# Patient Record
Sex: MALE | Race: WHITE | NOT HISPANIC OR LATINO | Employment: OTHER | URBAN - METROPOLITAN AREA
[De-identification: names, ages, dates, MRNs, and addresses within clinical notes are randomized per-mention and may not be internally consistent; named-entity substitution may affect disease eponyms.]

---

## 2018-01-10 ENCOUNTER — TRANSCRIBE ORDERS (OUTPATIENT)
Dept: LAB | Facility: CLINIC | Age: 68
End: 2018-01-10

## 2018-01-10 DIAGNOSIS — E78.2 MIXED HYPERLIPIDEMIA: Primary | ICD-10-CM

## 2021-05-18 ENCOUNTER — TRANSCRIBE ORDERS (OUTPATIENT)
Dept: LAB | Facility: CLINIC | Age: 71
End: 2021-05-18

## 2021-05-18 ENCOUNTER — APPOINTMENT (OUTPATIENT)
Dept: LAB | Facility: CLINIC | Age: 71
End: 2021-05-18
Payer: COMMERCIAL

## 2021-05-18 DIAGNOSIS — N40.0 BENIGN PROSTATIC HYPERPLASIA, UNSPECIFIED WHETHER LOWER URINARY TRACT SYMPTOMS PRESENT: ICD-10-CM

## 2021-05-18 DIAGNOSIS — E66.9 LIFELONG OBESITY: ICD-10-CM

## 2021-05-18 DIAGNOSIS — R97.20 ELEVATED PROSTATE SPECIFIC ANTIGEN (PSA): ICD-10-CM

## 2021-05-18 DIAGNOSIS — I10 ESSENTIAL HYPERTENSION, BENIGN: ICD-10-CM

## 2021-05-18 DIAGNOSIS — R53.83 TIREDNESS: ICD-10-CM

## 2021-05-18 DIAGNOSIS — E55.9 VITAMIN D DEFICIENCY DISEASE: ICD-10-CM

## 2021-05-18 DIAGNOSIS — E78.00 PURE HYPERCHOLESTEROLEMIA: ICD-10-CM

## 2021-05-18 DIAGNOSIS — E29.1 MALE HYPOGONADISM: ICD-10-CM

## 2021-05-18 DIAGNOSIS — I10 ESSENTIAL HYPERTENSION, BENIGN: Primary | ICD-10-CM

## 2021-05-18 LAB
25(OH)D3 SERPL-MCNC: 21.4 NG/ML (ref 30–100)
ALBUMIN SERPL BCP-MCNC: 3.8 G/DL (ref 3.5–5)
ALP SERPL-CCNC: 85 U/L (ref 46–116)
ALT SERPL W P-5'-P-CCNC: 32 U/L (ref 12–78)
ANION GAP SERPL CALCULATED.3IONS-SCNC: 4 MMOL/L (ref 4–13)
AST SERPL W P-5'-P-CCNC: 21 U/L (ref 5–45)
BASOPHILS # BLD AUTO: 0.03 THOUSANDS/ΜL (ref 0–0.1)
BASOPHILS NFR BLD AUTO: 0 % (ref 0–1)
BILIRUB SERPL-MCNC: 0.82 MG/DL (ref 0.2–1)
BILIRUB UR QL STRIP: NEGATIVE
BUN SERPL-MCNC: 16 MG/DL (ref 5–25)
CALCIUM SERPL-MCNC: 9.1 MG/DL (ref 8.3–10.1)
CHLORIDE SERPL-SCNC: 107 MMOL/L (ref 100–108)
CHOLEST SERPL-MCNC: 166 MG/DL (ref 50–200)
CLARITY UR: CLEAR
CO2 SERPL-SCNC: 30 MMOL/L (ref 21–32)
COLOR UR: YELLOW
CREAT SERPL-MCNC: 0.89 MG/DL (ref 0.6–1.3)
EOSINOPHIL # BLD AUTO: 0.4 THOUSAND/ΜL (ref 0–0.61)
EOSINOPHIL NFR BLD AUTO: 6 % (ref 0–6)
ERYTHROCYTE [DISTWIDTH] IN BLOOD BY AUTOMATED COUNT: 13.8 % (ref 11.6–15.1)
GFR SERPL CREATININE-BSD FRML MDRD: 85 ML/MIN/1.73SQ M
GLUCOSE P FAST SERPL-MCNC: 93 MG/DL (ref 65–99)
GLUCOSE UR STRIP-MCNC: NEGATIVE MG/DL
HCT VFR BLD AUTO: 50.7 % (ref 36.5–49.3)
HDLC SERPL-MCNC: 51 MG/DL
HGB BLD-MCNC: 16.5 G/DL (ref 12–17)
HGB UR QL STRIP.AUTO: NEGATIVE
IMM GRANULOCYTES # BLD AUTO: 0.01 THOUSAND/UL (ref 0–0.2)
IMM GRANULOCYTES NFR BLD AUTO: 0 % (ref 0–2)
KETONES UR STRIP-MCNC: NEGATIVE MG/DL
LDLC SERPL CALC-MCNC: 95 MG/DL (ref 0–100)
LEUKOCYTE ESTERASE UR QL STRIP: NEGATIVE
LYMPHOCYTES # BLD AUTO: 2.2 THOUSANDS/ΜL (ref 0.6–4.47)
LYMPHOCYTES NFR BLD AUTO: 33 % (ref 14–44)
MCH RBC QN AUTO: 29 PG (ref 26.8–34.3)
MCHC RBC AUTO-ENTMCNC: 32.5 G/DL (ref 31.4–37.4)
MCV RBC AUTO: 89 FL (ref 82–98)
MONOCYTES # BLD AUTO: 0.46 THOUSAND/ΜL (ref 0.17–1.22)
MONOCYTES NFR BLD AUTO: 7 % (ref 4–12)
NEUTROPHILS # BLD AUTO: 3.57 THOUSANDS/ΜL (ref 1.85–7.62)
NEUTS SEG NFR BLD AUTO: 54 % (ref 43–75)
NITRITE UR QL STRIP: NEGATIVE
NONHDLC SERPL-MCNC: 115 MG/DL
NRBC BLD AUTO-RTO: 0 /100 WBCS
PH UR STRIP.AUTO: 7 [PH]
PLATELET # BLD AUTO: 163 THOUSANDS/UL (ref 149–390)
PMV BLD AUTO: 11 FL (ref 8.9–12.7)
POTASSIUM SERPL-SCNC: 3.8 MMOL/L (ref 3.5–5.3)
PROT SERPL-MCNC: 7.7 G/DL (ref 6.4–8.2)
PROT UR STRIP-MCNC: NEGATIVE MG/DL
PSA SERPL-MCNC: 0.4 NG/ML (ref 0–4)
RBC # BLD AUTO: 5.69 MILLION/UL (ref 3.88–5.62)
SODIUM SERPL-SCNC: 141 MMOL/L (ref 136–145)
SP GR UR STRIP.AUTO: 1.01 (ref 1–1.03)
TESTOST SERPL-MCNC: 257 NG/DL (ref 95–948)
TRIGL SERPL-MCNC: 101 MG/DL
UROBILINOGEN UR QL STRIP.AUTO: 0.2 E.U./DL
VIT B12 SERPL-MCNC: 658 PG/ML (ref 100–900)
WBC # BLD AUTO: 6.67 THOUSAND/UL (ref 4.31–10.16)

## 2021-05-18 PROCEDURE — 80053 COMPREHEN METABOLIC PANEL: CPT

## 2021-05-18 PROCEDURE — 80061 LIPID PANEL: CPT

## 2021-05-18 PROCEDURE — 84403 ASSAY OF TOTAL TESTOSTERONE: CPT

## 2021-05-18 PROCEDURE — 82607 VITAMIN B-12: CPT

## 2021-05-18 PROCEDURE — 82306 VITAMIN D 25 HYDROXY: CPT

## 2021-05-18 PROCEDURE — 85025 COMPLETE CBC W/AUTO DIFF WBC: CPT

## 2021-05-18 PROCEDURE — 36415 COLL VENOUS BLD VENIPUNCTURE: CPT

## 2021-05-18 PROCEDURE — 81003 URINALYSIS AUTO W/O SCOPE: CPT

## 2021-05-18 PROCEDURE — G0103 PSA SCREENING: HCPCS

## 2021-11-03 ENCOUNTER — APPOINTMENT (OUTPATIENT)
Dept: LAB | Facility: CLINIC | Age: 71
End: 2021-11-03
Payer: COMMERCIAL

## 2021-11-03 DIAGNOSIS — I10 ESSENTIAL HYPERTENSION, MALIGNANT: ICD-10-CM

## 2021-11-03 DIAGNOSIS — E78.00 PURE HYPERCHOLESTEROLEMIA: ICD-10-CM

## 2021-11-03 LAB
ALBUMIN SERPL BCP-MCNC: 3.7 G/DL (ref 3.5–5)
ALP SERPL-CCNC: 86 U/L (ref 46–116)
ALT SERPL W P-5'-P-CCNC: 28 U/L (ref 12–78)
ANION GAP SERPL CALCULATED.3IONS-SCNC: 3 MMOL/L (ref 4–13)
AST SERPL W P-5'-P-CCNC: 17 U/L (ref 5–45)
BILIRUB SERPL-MCNC: 0.76 MG/DL (ref 0.2–1)
BUN SERPL-MCNC: 12 MG/DL (ref 5–25)
CALCIUM SERPL-MCNC: 9.3 MG/DL (ref 8.3–10.1)
CHLORIDE SERPL-SCNC: 106 MMOL/L (ref 100–108)
CHOLEST SERPL-MCNC: 152 MG/DL (ref 50–200)
CO2 SERPL-SCNC: 32 MMOL/L (ref 21–32)
CREAT SERPL-MCNC: 0.98 MG/DL (ref 0.6–1.3)
GFR SERPL CREATININE-BSD FRML MDRD: 76 ML/MIN/1.73SQ M
GLUCOSE P FAST SERPL-MCNC: 103 MG/DL (ref 65–99)
HDLC SERPL-MCNC: 50 MG/DL
LDLC SERPL CALC-MCNC: 82 MG/DL (ref 0–100)
NONHDLC SERPL-MCNC: 102 MG/DL
POTASSIUM SERPL-SCNC: 3.7 MMOL/L (ref 3.5–5.3)
PROT SERPL-MCNC: 8 G/DL (ref 6.4–8.2)
SODIUM SERPL-SCNC: 141 MMOL/L (ref 136–145)
TRIGL SERPL-MCNC: 100 MG/DL

## 2021-11-03 PROCEDURE — 80053 COMPREHEN METABOLIC PANEL: CPT

## 2021-11-03 PROCEDURE — 36415 COLL VENOUS BLD VENIPUNCTURE: CPT

## 2021-11-03 PROCEDURE — 80061 LIPID PANEL: CPT

## 2022-02-15 ENCOUNTER — TELEPHONE (OUTPATIENT)
Dept: FAMILY MEDICINE CLINIC | Facility: CLINIC | Age: 72
End: 2022-02-15

## 2022-02-15 NOTE — TELEPHONE ENCOUNTER
Per pt chart pt is being followed by Dr Shoenberger in [facility]    Please remove Dr Horton as pt PCP

## 2022-05-10 ENCOUNTER — APPOINTMENT (OUTPATIENT)
Dept: LAB | Facility: CLINIC | Age: 72
End: 2022-05-10
Payer: COMMERCIAL

## 2022-05-10 DIAGNOSIS — E55.9 AVITAMINOSIS D: ICD-10-CM

## 2022-05-10 DIAGNOSIS — E78.01 FAMILIAL HYPERCHOLESTEROLEMIA: ICD-10-CM

## 2022-05-10 DIAGNOSIS — I51.9 MYXEDEMA HEART DISEASE: ICD-10-CM

## 2022-05-10 DIAGNOSIS — R97.20 ELEVATED PROSTATE SPECIFIC ANTIGEN (PSA): ICD-10-CM

## 2022-05-10 DIAGNOSIS — E03.9 MYXEDEMA HEART DISEASE: ICD-10-CM

## 2022-05-10 DIAGNOSIS — E53.8 BIOTIN-(PROPIONYL-COA-CARBOXYLASE) LIGASE DEFICIENCY: ICD-10-CM

## 2022-05-10 DIAGNOSIS — I10 ESSENTIAL HYPERTENSION, MALIGNANT: ICD-10-CM

## 2022-05-10 DIAGNOSIS — N40.0 ENLARGED PROSTATE: ICD-10-CM

## 2022-05-10 LAB
25(OH)D3 SERPL-MCNC: 74.3 NG/ML (ref 30–100)
ALBUMIN SERPL BCP-MCNC: 3.7 G/DL (ref 3.5–5)
ALP SERPL-CCNC: 80 U/L (ref 46–116)
ALT SERPL W P-5'-P-CCNC: 35 U/L (ref 12–78)
ANION GAP SERPL CALCULATED.3IONS-SCNC: 3 MMOL/L (ref 4–13)
AST SERPL W P-5'-P-CCNC: 23 U/L (ref 5–45)
BASOPHILS # BLD AUTO: 0.02 THOUSANDS/ΜL (ref 0–0.1)
BASOPHILS NFR BLD AUTO: 0 % (ref 0–1)
BILIRUB SERPL-MCNC: 0.86 MG/DL (ref 0.2–1)
BUN SERPL-MCNC: 20 MG/DL (ref 5–25)
CALCIUM SERPL-MCNC: 9.2 MG/DL (ref 8.3–10.1)
CHLORIDE SERPL-SCNC: 111 MMOL/L (ref 100–108)
CHOLEST SERPL-MCNC: 160 MG/DL
CO2 SERPL-SCNC: 30 MMOL/L (ref 21–32)
CREAT SERPL-MCNC: 1.08 MG/DL (ref 0.6–1.3)
EOSINOPHIL # BLD AUTO: 0.36 THOUSAND/ΜL (ref 0–0.61)
EOSINOPHIL NFR BLD AUTO: 4 % (ref 0–6)
ERYTHROCYTE [DISTWIDTH] IN BLOOD BY AUTOMATED COUNT: 13.8 % (ref 11.6–15.1)
GFR SERPL CREATININE-BSD FRML MDRD: 67 ML/MIN/1.73SQ M
GLUCOSE P FAST SERPL-MCNC: 107 MG/DL (ref 65–99)
HCT VFR BLD AUTO: 49.5 % (ref 36.5–49.3)
HDLC SERPL-MCNC: 50 MG/DL
HGB BLD-MCNC: 16 G/DL (ref 12–17)
IMM GRANULOCYTES # BLD AUTO: 0.02 THOUSAND/UL (ref 0–0.2)
IMM GRANULOCYTES NFR BLD AUTO: 0 % (ref 0–2)
LDLC SERPL CALC-MCNC: 88 MG/DL (ref 0–100)
LYMPHOCYTES # BLD AUTO: 1.35 THOUSANDS/ΜL (ref 0.6–4.47)
LYMPHOCYTES NFR BLD AUTO: 17 % (ref 14–44)
MCH RBC QN AUTO: 28.6 PG (ref 26.8–34.3)
MCHC RBC AUTO-ENTMCNC: 32.3 G/DL (ref 31.4–37.4)
MCV RBC AUTO: 89 FL (ref 82–98)
MONOCYTES # BLD AUTO: 0.59 THOUSAND/ΜL (ref 0.17–1.22)
MONOCYTES NFR BLD AUTO: 7 % (ref 4–12)
NEUTROPHILS # BLD AUTO: 5.78 THOUSANDS/ΜL (ref 1.85–7.62)
NEUTS SEG NFR BLD AUTO: 72 % (ref 43–75)
NONHDLC SERPL-MCNC: 110 MG/DL
NRBC BLD AUTO-RTO: 0 /100 WBCS
PLATELET # BLD AUTO: 163 THOUSANDS/UL (ref 149–390)
PMV BLD AUTO: 11.1 FL (ref 8.9–12.7)
POTASSIUM SERPL-SCNC: 4 MMOL/L (ref 3.5–5.3)
PROT SERPL-MCNC: 7.8 G/DL (ref 6.4–8.2)
PSA SERPL-MCNC: 0.8 NG/ML (ref 0–4)
RBC # BLD AUTO: 5.59 MILLION/UL (ref 3.88–5.62)
SODIUM SERPL-SCNC: 144 MMOL/L (ref 136–145)
TRIGL SERPL-MCNC: 112 MG/DL
TSH SERPL DL<=0.05 MIU/L-ACNC: 3.49 UIU/ML (ref 0.45–4.5)
VIT B12 SERPL-MCNC: 673 PG/ML (ref 100–900)
WBC # BLD AUTO: 8.12 THOUSAND/UL (ref 4.31–10.16)

## 2022-05-10 PROCEDURE — 36415 COLL VENOUS BLD VENIPUNCTURE: CPT

## 2022-05-10 PROCEDURE — 80053 COMPREHEN METABOLIC PANEL: CPT

## 2022-05-10 PROCEDURE — 85025 COMPLETE CBC W/AUTO DIFF WBC: CPT

## 2022-05-10 PROCEDURE — 82306 VITAMIN D 25 HYDROXY: CPT

## 2022-05-10 PROCEDURE — 84443 ASSAY THYROID STIM HORMONE: CPT

## 2022-05-10 PROCEDURE — G0103 PSA SCREENING: HCPCS

## 2022-05-10 PROCEDURE — 82607 VITAMIN B-12: CPT

## 2022-05-10 PROCEDURE — 80061 LIPID PANEL: CPT

## 2022-05-31 NOTE — TELEPHONE ENCOUNTER
05/31/22 3:48 PM     Thank you for your request  Your request has been received, reviewed, and the patient chart updated  The PCP has successfully been removed with a patient attribution note  Please remind staff to not remove PCP at office level for this scenario; [facility] Department will remove  This message will now be completed      Thank you  Marlo Freeman

## 2022-12-14 ENCOUNTER — APPOINTMENT (OUTPATIENT)
Dept: LAB | Facility: CLINIC | Age: 72
End: 2022-12-14

## 2022-12-14 DIAGNOSIS — E55.9 AVITAMINOSIS D: ICD-10-CM

## 2022-12-14 DIAGNOSIS — N40.0 BENIGN PROSTATIC HYPERPLASIA, UNSPECIFIED WHETHER LOWER URINARY TRACT SYMPTOMS PRESENT: ICD-10-CM

## 2022-12-14 DIAGNOSIS — I51.9 MYXEDEMA HEART DISEASE: ICD-10-CM

## 2022-12-14 DIAGNOSIS — I10 ESSENTIAL HYPERTENSION, MALIGNANT: ICD-10-CM

## 2022-12-14 DIAGNOSIS — E53.8 BIOTIN-(PROPIONYL-COA-CARBOXYLASE) LIGASE DEFICIENCY: ICD-10-CM

## 2022-12-14 DIAGNOSIS — E03.9 MYXEDEMA HEART DISEASE: ICD-10-CM

## 2022-12-14 DIAGNOSIS — E78.49 OTHER HYPERLIPIDEMIA: ICD-10-CM

## 2022-12-14 LAB
25(OH)D3 SERPL-MCNC: 69.9 NG/ML (ref 30–100)
ALBUMIN SERPL BCP-MCNC: 3.8 G/DL (ref 3.5–5)
ALP SERPL-CCNC: 69 U/L (ref 46–116)
ALT SERPL W P-5'-P-CCNC: 31 U/L (ref 12–78)
ANION GAP SERPL CALCULATED.3IONS-SCNC: 5 MMOL/L (ref 4–13)
AST SERPL W P-5'-P-CCNC: 21 U/L (ref 5–45)
BASOPHILS # BLD AUTO: 0.02 THOUSANDS/ÂΜL (ref 0–0.1)
BASOPHILS NFR BLD AUTO: 0 % (ref 0–1)
BILIRUB SERPL-MCNC: 0.69 MG/DL (ref 0.2–1)
BUN SERPL-MCNC: 18 MG/DL (ref 5–25)
CALCIUM SERPL-MCNC: 9.2 MG/DL (ref 8.3–10.1)
CHLORIDE SERPL-SCNC: 108 MMOL/L (ref 96–108)
CHOLEST SERPL-MCNC: 162 MG/DL
CO2 SERPL-SCNC: 30 MMOL/L (ref 21–32)
CREAT SERPL-MCNC: 0.97 MG/DL (ref 0.6–1.3)
EOSINOPHIL # BLD AUTO: 0.43 THOUSAND/ÂΜL (ref 0–0.61)
EOSINOPHIL NFR BLD AUTO: 6 % (ref 0–6)
ERYTHROCYTE [DISTWIDTH] IN BLOOD BY AUTOMATED COUNT: 13.9 % (ref 11.6–15.1)
GFR SERPL CREATININE-BSD FRML MDRD: 76 ML/MIN/1.73SQ M
GLUCOSE P FAST SERPL-MCNC: 106 MG/DL (ref 65–99)
HCT VFR BLD AUTO: 50.9 % (ref 36.5–49.3)
HDLC SERPL-MCNC: 55 MG/DL
HGB BLD-MCNC: 16.1 G/DL (ref 12–17)
IMM GRANULOCYTES # BLD AUTO: 0.02 THOUSAND/UL (ref 0–0.2)
IMM GRANULOCYTES NFR BLD AUTO: 0 % (ref 0–2)
LDLC SERPL CALC-MCNC: 90 MG/DL (ref 0–100)
LYMPHOCYTES # BLD AUTO: 2.3 THOUSANDS/ÂΜL (ref 0.6–4.47)
LYMPHOCYTES NFR BLD AUTO: 31 % (ref 14–44)
MCH RBC QN AUTO: 28.9 PG (ref 26.8–34.3)
MCHC RBC AUTO-ENTMCNC: 31.6 G/DL (ref 31.4–37.4)
MCV RBC AUTO: 91 FL (ref 82–98)
MONOCYTES # BLD AUTO: 0.6 THOUSAND/ÂΜL (ref 0.17–1.22)
MONOCYTES NFR BLD AUTO: 8 % (ref 4–12)
NEUTROPHILS # BLD AUTO: 4.18 THOUSANDS/ÂΜL (ref 1.85–7.62)
NEUTS SEG NFR BLD AUTO: 55 % (ref 43–75)
NONHDLC SERPL-MCNC: 107 MG/DL
NRBC BLD AUTO-RTO: 0 /100 WBCS
PLATELET # BLD AUTO: 209 THOUSANDS/UL (ref 149–390)
PMV BLD AUTO: 11.1 FL (ref 8.9–12.7)
POTASSIUM SERPL-SCNC: 4 MMOL/L (ref 3.5–5.3)
PROT SERPL-MCNC: 7.3 G/DL (ref 6.4–8.4)
PSA SERPL-MCNC: 0.5 NG/ML (ref 0–4)
RBC # BLD AUTO: 5.58 MILLION/UL (ref 3.88–5.62)
SODIUM SERPL-SCNC: 143 MMOL/L (ref 135–147)
T4 FREE SERPL-MCNC: 1 NG/DL (ref 0.76–1.46)
TESTOST SERPL-MCNC: 206 NG/DL (ref 95–948)
TRIGL SERPL-MCNC: 86 MG/DL
TSH SERPL DL<=0.05 MIU/L-ACNC: 6.79 UIU/ML (ref 0.45–4.5)
VIT B12 SERPL-MCNC: 741 PG/ML (ref 100–900)
WBC # BLD AUTO: 7.55 THOUSAND/UL (ref 4.31–10.16)

## 2022-12-21 ENCOUNTER — HOSPITAL ENCOUNTER (OUTPATIENT)
Facility: MEDICAL CENTER | Age: 72
Discharge: HOME | End: 2022-12-21

## 2022-12-21 DIAGNOSIS — R22.1 NECK MASS: ICD-10-CM

## 2023-04-07 ENCOUNTER — APPOINTMENT (OUTPATIENT)
Dept: LAB | Facility: CLINIC | Age: 73
End: 2023-04-07

## 2023-04-07 ENCOUNTER — TRANSCRIBE ORDERS (OUTPATIENT)
Dept: LAB | Facility: CLINIC | Age: 73
End: 2023-04-07

## 2023-04-07 DIAGNOSIS — E03.9 HYPOTHYROIDISM (ACQUIRED): Primary | ICD-10-CM

## 2023-04-07 DIAGNOSIS — E03.9 HYPOTHYROIDISM (ACQUIRED): ICD-10-CM

## 2023-04-07 LAB
T4 FREE SERPL-MCNC: 0.98 NG/DL (ref 0.76–1.46)
TSH SERPL DL<=0.05 MIU/L-ACNC: 3.43 UIU/ML (ref 0.45–4.5)

## 2023-05-31 ENCOUNTER — APPOINTMENT (OUTPATIENT)
Dept: LAB | Facility: CLINIC | Age: 73
End: 2023-05-31
Payer: COMMERCIAL

## 2023-05-31 DIAGNOSIS — Z01.818 PRE-OPERATIVE EXAMINATION: ICD-10-CM

## 2023-05-31 DIAGNOSIS — K11.5 SIALOLITHIASIS: ICD-10-CM

## 2023-05-31 LAB
ANION GAP SERPL CALCULATED.3IONS-SCNC: 1 MMOL/L (ref 4–13)
BASOPHILS # BLD AUTO: 0.04 THOUSANDS/ÂΜL (ref 0–0.1)
BASOPHILS NFR BLD AUTO: 1 % (ref 0–1)
BUN SERPL-MCNC: 14 MG/DL (ref 5–25)
CALCIUM SERPL-MCNC: 9.4 MG/DL (ref 8.3–10.1)
CHLORIDE SERPL-SCNC: 109 MMOL/L (ref 96–108)
CO2 SERPL-SCNC: 31 MMOL/L (ref 21–32)
CREAT SERPL-MCNC: 1.08 MG/DL (ref 0.6–1.3)
EOSINOPHIL # BLD AUTO: 0.58 THOUSAND/ÂΜL (ref 0–0.61)
EOSINOPHIL NFR BLD AUTO: 8 % (ref 0–6)
ERYTHROCYTE [DISTWIDTH] IN BLOOD BY AUTOMATED COUNT: 13.6 % (ref 11.6–15.1)
GFR SERPL CREATININE-BSD FRML MDRD: 66 ML/MIN/1.73SQ M
GLUCOSE P FAST SERPL-MCNC: 104 MG/DL (ref 65–99)
HCT VFR BLD AUTO: 49.2 % (ref 36.5–49.3)
HGB BLD-MCNC: 15.9 G/DL (ref 12–17)
IMM GRANULOCYTES # BLD AUTO: 0.01 THOUSAND/UL (ref 0–0.2)
IMM GRANULOCYTES NFR BLD AUTO: 0 % (ref 0–2)
LYMPHOCYTES # BLD AUTO: 2.11 THOUSANDS/ÂΜL (ref 0.6–4.47)
LYMPHOCYTES NFR BLD AUTO: 27 % (ref 14–44)
MCH RBC QN AUTO: 28.9 PG (ref 26.8–34.3)
MCHC RBC AUTO-ENTMCNC: 32.3 G/DL (ref 31.4–37.4)
MCV RBC AUTO: 90 FL (ref 82–98)
MONOCYTES # BLD AUTO: 0.48 THOUSAND/ÂΜL (ref 0.17–1.22)
MONOCYTES NFR BLD AUTO: 6 % (ref 4–12)
NEUTROPHILS # BLD AUTO: 4.55 THOUSANDS/ÂΜL (ref 1.85–7.62)
NEUTS SEG NFR BLD AUTO: 58 % (ref 43–75)
NRBC BLD AUTO-RTO: 0 /100 WBCS
PLATELET # BLD AUTO: 232 THOUSANDS/UL (ref 149–390)
PMV BLD AUTO: 10.8 FL (ref 8.9–12.7)
POTASSIUM SERPL-SCNC: 3.7 MMOL/L (ref 3.5–5.3)
RBC # BLD AUTO: 5.5 MILLION/UL (ref 3.88–5.62)
SODIUM SERPL-SCNC: 141 MMOL/L (ref 135–147)
WBC # BLD AUTO: 7.77 THOUSAND/UL (ref 4.31–10.16)

## 2023-05-31 PROCEDURE — 80048 BASIC METABOLIC PNL TOTAL CA: CPT

## 2023-05-31 PROCEDURE — 85025 COMPLETE CBC W/AUTO DIFF WBC: CPT

## 2023-05-31 PROCEDURE — 36415 COLL VENOUS BLD VENIPUNCTURE: CPT

## 2023-06-08 RX ORDER — IBUPROFEN 400 MG/1
TABLET ORAL EVERY 6 HOURS PRN
COMMUNITY

## 2023-06-08 RX ORDER — ACETAMINOPHEN 325 MG/1
650 TABLET ORAL EVERY 6 HOURS PRN
COMMUNITY

## 2023-06-08 RX ORDER — MULTIVIT-MIN/IRON FUM/FOLIC AC 7.5 MG-4
1 TABLET ORAL DAILY
COMMUNITY

## 2023-06-08 NOTE — PRE-PROCEDURE INSTRUCTIONS
Pre-Surgery Instructions:   Medication Instructions   • acetaminophen (TYLENOL) 325 mg tablet Uses PRN- OK to take day of surgery   • aspirin (ECOTRIN LOW STRENGTH) 81 mg EC tablet Stop taking 7 days prior to surgery  • atorvastatin (LIPITOR) 20 mg tablet Take night before surgery   • CALCIUM-VITAMIN D PO Hold day of surgery  • candesartan (ATACAND) 32 MG tablet Take night before surgery   • ibuprofen (MOTRIN) 400 mg tablet Stop taking 7 days prior to surgery  • indomethacin (INDOCIN) 50 mg capsule Stop taking 7 days prior to surgery  • loratadine (CLARITIN) 10 mg tablet Take night before surgery   • Multiple Vitamins-Minerals (multivitamin with minerals) tablet Stop taking 7 days prior to surgery  • VITAMIN D PO Hold day of surgery  See above      Medication instructions for day surgery reviewed  Please use only a sip of water to take your instructed medications  Avoid all over the counter vitamins, supplements and NSAIDS for one week prior to surgery per anesthesia guidelines  Tylenol is ok to take as needed  You will receive a call one business day prior to surgery with an arrival time and hospital directions  If your surgery is scheduled on a Monday, the hospital will be calling you on the Friday prior to your surgery  If you have not heard from anyone by 8pm, please call the hospital supervisor through the hospital  at [phone]  Edwina Bansal [phone])  Do not eat or drink anything after midnight the night before your surgery, including candy, mints, lifesavers, or chewing gum  Do not drink alcohol 24hrs before your surgery  Try not to smoke at least 24hrs before your surgery  Follow the pre surgery showering instructions as listed in the [facility] Surgical Experience Booklet” or otherwise provided by your surgeon's office  Do not shave the surgical area 24 hours before surgery   Do not apply any lotions, creams, including makeup, cologne, deodorant, or perfumes after showering on the day of your surgery  No contact lenses, eye make-up, or artificial eyelashes  Remove nail polish, including gel polish, and any artificial, gel, or acrylic nails if possible  Remove all jewelry including rings and body piercing jewelry  Wear causal clothing that is easy to take on and off  Consider your type of surgery  Keep any valuables, jewelry, piercings at home  Please bring any specially ordered equipment (sling, braces) if indicated  Arrange for a responsible person to drive you to and from the hospital on the day of your surgery  Visitor Guidelines discussed  Call the surgeon's office with any new illnesses, exposures, or additional questions prior to surgery  Please reference your [facility] Surgical Experience Booklet” for additional information to prepare for your upcoming surgery

## 2023-06-13 ENCOUNTER — ANESTHESIA EVENT (OUTPATIENT)
Dept: PERIOP | Facility: HOSPITAL | Age: 73
End: 2023-06-13
Payer: COMMERCIAL

## 2023-06-14 ENCOUNTER — ANESTHESIA (OUTPATIENT)
Dept: PERIOP | Facility: HOSPITAL | Age: 73
End: 2023-06-14
Payer: COMMERCIAL

## 2023-06-14 ENCOUNTER — HOSPITAL ENCOUNTER (OUTPATIENT)
Facility: HOSPITAL | Age: 73
Setting detail: OUTPATIENT SURGERY
Discharge: HOME | End: 2023-06-14
Attending: OTOLARYNGOLOGY | Admitting: OTOLARYNGOLOGY
Payer: COMMERCIAL

## 2023-06-14 VITALS
TEMPERATURE: 97 F | WEIGHT: 235 LBS | HEART RATE: 84 BPM | DIASTOLIC BLOOD PRESSURE: 64 MMHG | RESPIRATION RATE: 16 BRPM | SYSTOLIC BLOOD PRESSURE: 132 MMHG | HEIGHT: 74 IN | OXYGEN SATURATION: 94 % | BODY MASS INDEX: 30.16 KG/M2

## 2023-06-14 DIAGNOSIS — K11.5 SIALOLITH: ICD-10-CM

## 2023-06-14 DIAGNOSIS — K11.23 CHRONIC SIALOADENITIS: Primary | ICD-10-CM

## 2023-06-14 DIAGNOSIS — K11.23 CHRONIC SIALOADENITIS: ICD-10-CM

## 2023-06-14 PROCEDURE — 88365 INSITU HYBRIDIZATION (FISH): CPT | Performed by: STUDENT IN AN ORGANIZED HEALTH CARE EDUCATION/TRAINING PROGRAM

## 2023-06-14 PROCEDURE — 88364 INSITU HYBRIDIZATION (FISH): CPT | Performed by: STUDENT IN AN ORGANIZED HEALTH CARE EDUCATION/TRAINING PROGRAM

## 2023-06-14 PROCEDURE — 88341 IMHCHEM/IMCYTCHM EA ADD ANTB: CPT | Performed by: STUDENT IN AN ORGANIZED HEALTH CARE EDUCATION/TRAINING PROGRAM

## 2023-06-14 PROCEDURE — 88342 IMHCHEM/IMCYTCHM 1ST ANTB: CPT | Performed by: STUDENT IN AN ORGANIZED HEALTH CARE EDUCATION/TRAINING PROGRAM

## 2023-06-14 PROCEDURE — 88307 TISSUE EXAM BY PATHOLOGIST: CPT | Performed by: STUDENT IN AN ORGANIZED HEALTH CARE EDUCATION/TRAINING PROGRAM

## 2023-06-14 PROCEDURE — 42440 EXCISE SUBMAXILLARY GLAND: CPT | Performed by: OTOLARYNGOLOGY

## 2023-06-14 RX ORDER — CEFAZOLIN SODIUM 2 G/50ML
SOLUTION INTRAVENOUS AS NEEDED
Status: DISCONTINUED | OUTPATIENT
Start: 2023-06-14 | End: 2023-06-14

## 2023-06-14 RX ORDER — ONDANSETRON 2 MG/ML
INJECTION INTRAMUSCULAR; INTRAVENOUS AS NEEDED
Status: DISCONTINUED | OUTPATIENT
Start: 2023-06-14 | End: 2023-06-14

## 2023-06-14 RX ORDER — LIDOCAINE HYDROCHLORIDE AND EPINEPHRINE 10; 10 MG/ML; UG/ML
INJECTION, SOLUTION INFILTRATION; PERINEURAL AS NEEDED
Status: DISCONTINUED | OUTPATIENT
Start: 2023-06-14 | End: 2023-06-14 | Stop reason: HOSPADM

## 2023-06-14 RX ORDER — SODIUM CHLORIDE 9 MG/ML
125 INJECTION, SOLUTION INTRAVENOUS CONTINUOUS
Status: DISCONTINUED | OUTPATIENT
Start: 2023-06-14 | End: 2023-06-14 | Stop reason: HOSPADM

## 2023-06-14 RX ORDER — SUCCINYLCHOLINE/SOD CL,ISO/PF 100 MG/5ML
SYRINGE (ML) INTRAVENOUS AS NEEDED
Status: DISCONTINUED | OUTPATIENT
Start: 2023-06-14 | End: 2023-06-14

## 2023-06-14 RX ORDER — HYDROMORPHONE HCL/PF 1 MG/ML
0.5 SYRINGE (ML) INJECTION
Status: CANCELLED | OUTPATIENT
Start: 2023-06-14

## 2023-06-14 RX ORDER — HYDROMORPHONE HCL/PF 1 MG/ML
SYRINGE (ML) INJECTION AS NEEDED
Status: DISCONTINUED | OUTPATIENT
Start: 2023-06-14 | End: 2023-06-14

## 2023-06-14 RX ORDER — MAGNESIUM HYDROXIDE 1200 MG/15ML
LIQUID ORAL AS NEEDED
Status: DISCONTINUED | OUTPATIENT
Start: 2023-06-14 | End: 2023-06-14 | Stop reason: HOSPADM

## 2023-06-14 RX ORDER — ACETAMINOPHEN 325 MG/1
325 TABLET ORAL EVERY 6 HOURS PRN
Status: DISCONTINUED | OUTPATIENT
Start: 2023-06-14 | End: 2023-06-14 | Stop reason: HOSPADM

## 2023-06-14 RX ORDER — PROPOFOL 10 MG/ML
INJECTION, EMULSION INTRAVENOUS CONTINUOUS PRN
Status: DISCONTINUED | OUTPATIENT
Start: 2023-06-14 | End: 2023-06-14

## 2023-06-14 RX ORDER — EPHEDRINE SULFATE 50 MG/ML
INJECTION INTRAVENOUS AS NEEDED
Status: DISCONTINUED | OUTPATIENT
Start: 2023-06-14 | End: 2023-06-14

## 2023-06-14 RX ORDER — ONDANSETRON 2 MG/ML
4 INJECTION INTRAMUSCULAR; INTRAVENOUS ONCE AS NEEDED
Status: DISCONTINUED | OUTPATIENT
Start: 2023-06-14 | End: 2023-06-14 | Stop reason: HOSPADM

## 2023-06-14 RX ORDER — MIDAZOLAM HYDROCHLORIDE 2 MG/2ML
INJECTION, SOLUTION INTRAMUSCULAR; INTRAVENOUS AS NEEDED
Status: DISCONTINUED | OUTPATIENT
Start: 2023-06-14 | End: 2023-06-14

## 2023-06-14 RX ORDER — DEXAMETHASONE SODIUM PHOSPHATE 10 MG/ML
INJECTION, SOLUTION INTRAMUSCULAR; INTRAVENOUS AS NEEDED
Status: DISCONTINUED | OUTPATIENT
Start: 2023-06-14 | End: 2023-06-14

## 2023-06-14 RX ORDER — FENTANYL CITRATE 50 UG/ML
INJECTION, SOLUTION INTRAMUSCULAR; INTRAVENOUS AS NEEDED
Status: DISCONTINUED | OUTPATIENT
Start: 2023-06-14 | End: 2023-06-14

## 2023-06-14 RX ORDER — HYDROCODONE BITARTRATE AND ACETAMINOPHEN 5; 325 MG/1; MG/1
1 TABLET ORAL EVERY 6 HOURS PRN
Qty: 15 TABLET | Refills: 0 | Status: SHIPPED | OUTPATIENT
Start: 2023-06-14

## 2023-06-14 RX ORDER — FENTANYL CITRATE/PF 50 MCG/ML
50 SYRINGE (ML) INJECTION
Status: DISCONTINUED | OUTPATIENT
Start: 2023-06-14 | End: 2023-06-14 | Stop reason: HOSPADM

## 2023-06-14 RX ORDER — OXYCODONE HYDROCHLORIDE 5 MG/1
5 TABLET ORAL EVERY 4 HOURS PRN
Status: DISCONTINUED | OUTPATIENT
Start: 2023-06-14 | End: 2023-06-14 | Stop reason: HOSPADM

## 2023-06-14 RX ORDER — PROPOFOL 10 MG/ML
INJECTION, EMULSION INTRAVENOUS AS NEEDED
Status: DISCONTINUED | OUTPATIENT
Start: 2023-06-14 | End: 2023-06-14

## 2023-06-14 RX ADMIN — EPHEDRINE SULFATE 5 MG: 50 INJECTION, SOLUTION INTRAVENOUS at 14:04

## 2023-06-14 RX ADMIN — HYDROMORPHONE HYDROCHLORIDE 0.25 MG: 1 INJECTION, SOLUTION INTRAMUSCULAR; INTRAVENOUS; SUBCUTANEOUS at 13:34

## 2023-06-14 RX ADMIN — SODIUM CHLORIDE 125 ML/HR: 0.9 INJECTION, SOLUTION INTRAVENOUS at 08:59

## 2023-06-14 RX ADMIN — OXYCODONE HYDROCHLORIDE 5 MG: 5 TABLET ORAL at 16:16

## 2023-06-14 RX ADMIN — SODIUM CHLORIDE 0.2 MCG/KG/MIN: 9 INJECTION, SOLUTION INTRAVENOUS at 12:42

## 2023-06-14 RX ADMIN — EPHEDRINE SULFATE 5 MG: 50 INJECTION, SOLUTION INTRAVENOUS at 13:04

## 2023-06-14 RX ADMIN — PHENYLEPHRINE HYDROCHLORIDE 40 MCG/MIN: 10 INJECTION INTRAVENOUS at 12:58

## 2023-06-14 RX ADMIN — EPHEDRINE SULFATE 10 MG: 50 INJECTION, SOLUTION INTRAVENOUS at 12:55

## 2023-06-14 RX ADMIN — EPHEDRINE SULFATE 5 MG: 50 INJECTION, SOLUTION INTRAVENOUS at 13:13

## 2023-06-14 RX ADMIN — DEXAMETHASONE SODIUM PHOSPHATE 10 MG: 10 INJECTION INTRAMUSCULAR; INTRAVENOUS at 12:43

## 2023-06-14 RX ADMIN — ONDANSETRON 4 MG: 2 INJECTION INTRAMUSCULAR; INTRAVENOUS at 12:43

## 2023-06-14 RX ADMIN — MIDAZOLAM 2 MG: 1 INJECTION INTRAMUSCULAR; INTRAVENOUS at 12:28

## 2023-06-14 RX ADMIN — HYDROMORPHONE HYDROCHLORIDE 0.25 MG: 1 INJECTION, SOLUTION INTRAMUSCULAR; INTRAVENOUS; SUBCUTANEOUS at 13:44

## 2023-06-14 RX ADMIN — FENTANYL CITRATE 50 MCG: 50 INJECTION, SOLUTION INTRAMUSCULAR; INTRAVENOUS at 14:55

## 2023-06-14 RX ADMIN — PROPOFOL 200 MG: 10 INJECTION, EMULSION INTRAVENOUS at 12:38

## 2023-06-14 RX ADMIN — PROPOFOL 130 MCG/KG/MIN: 10 INJECTION, EMULSION INTRAVENOUS at 12:42

## 2023-06-14 RX ADMIN — EPHEDRINE SULFATE 5 MG: 50 INJECTION, SOLUTION INTRAVENOUS at 13:55

## 2023-06-14 RX ADMIN — Medication 100 MG: at 12:38

## 2023-06-14 RX ADMIN — SODIUM CHLORIDE 125 ML/HR: 0.9 INJECTION, SOLUTION INTRAVENOUS at 15:00

## 2023-06-14 RX ADMIN — SODIUM CHLORIDE: 0.9 INJECTION, SOLUTION INTRAVENOUS at 13:18

## 2023-06-14 RX ADMIN — CEFAZOLIN SODIUM 2000 MG: 2 SOLUTION INTRAVENOUS at 12:50

## 2023-06-14 RX ADMIN — FENTANYL CITRATE 100 MCG: 50 INJECTION INTRAMUSCULAR; INTRAVENOUS at 12:38

## 2023-06-14 NOTE — PROGRESS NOTES
Dr Cesia Sharif here to talk to pt and wife earlier   Dr Gregoria Caruso text concerning pain medication for home, Motrin and Tylenol no narcotic or prescription on discharge instructions

## 2023-06-14 NOTE — ANESTHESIA PREPROCEDURE EVALUATION
Procedure:  SUBMANDIBULAR GLAND EXICISON (Left: Neck)    Relevant Problems   CARDIO   (+) Hyperlipidemia   (+) Hypertension        Physical Exam    Airway    Mallampati score: I  TM Distance: >3 FB  Neck ROM: full     Dental       Cardiovascular  Rhythm: regular, Rate: normal, Cardiovascular exam normal    Pulmonary  Pulmonary exam normal     Other Findings        Anesthesia Plan  ASA Score- 2     Anesthesia Type- general with ASA Monitors  Additional Monitors:   Airway Plan: ETT  Plan Factors-Exercise tolerance (METS): >4 METS  Chart reviewed  Existing labs reviewed  Patient summary reviewed  Patient is not a current smoker  Obstructive sleep apnea risk education given perioperatively  Induction- intravenous  Postoperative Plan-     Informed Consent- Anesthetic plan and risks discussed with patient

## 2023-06-14 NOTE — OP NOTE
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]    Procedure(s):  Left - SUBMANDIBULAR GLAND EXICISON    Specimen(s):  ID Type Source Tests Collected by Time Destination   1 : Left Submandibular gland Tissue Submandibular gland TISSUE EXAM Neo Dotson MD 2023  1:52 PM        Estimated Blood Loss:   5 mL    Drains:  * No LDAs found *    Anesthesia Type:   General    Operative Indications:  Sialolith [K11 5]  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]    Operative Findings:  Large 5cm massive sialolith  Preservation of marginal, lingual and hypoglossal  Facial vessels preserved     Complications:   None    Procedure and Technique:  Patient taken to the operating room and placed on the operating table in the supine position  Timeout performed  Patient placed under general endotracheal anesthesia  The planned incision was marked in a RSTL approximately 2 fingerbreadths below and parallel to the inferior border of the mandible on the left side  Lidocaine with epinephrine was infiltrated along the planned incision  The patient was prepped and draped in usual sterile fashion  15-blade was used to incise the skin  Electrocautery was used to continue dissection through subcutaneous tissue and platysma until the capsule of the submandibular gland was reached  Blunt dissection continued along the capsule  The facial vein/artery was identified, and preserved  The gland was reflected superiorly and dissection continued around it  The lingual nerve was identified and perserved, while a small branch to the gland was ligated    The facial artery was again ligated superiorly, and Whartons duct was identified, clamped, divided, and controlled with a suture ligature  Dissection continued until the specimen was freed circumferentially from surrounding tissue  The specimen was passed off the field  The wound was irrigated, and hemostasis was obtained with electrocautery, with care to avoid iatrogenic injury to the hypoglossal, facial, and lingual nerves  The deep tissue was approximated with 3-0 vicryl in interrupted fashion  The skin was closed with 4-0 monocryl in running subcuticular fashion  Steri-strips were applied to the incision  Patient was taken out of general anesthesia and transferred to the PACU in stable condition  I was present for the entire procedure , A qualified resident physician was not available  and A co-surgeon was required because of skills and techniques relevant to speciality      Patient Disposition:  PACU         SIGNATURE: Pawel Bill MD  DATE: June 14, 2023  TIME: 2:24 PM

## 2023-06-14 NOTE — DISCHARGE INSTR - AVS FIRST PAGE
SON Arango  Post-Operative Instructions  Office ([phone]       - Keep wound clean and dry  May apply dressing as needed but generally leave open to air  - Okay to shower but do not submerge in water for 2 weeks, pat incision dry (don't rub)  - Avoid lifting anything greater than 10 lbs (a gallon of milk) for 3 weeks  - Activity and diet as tolerated  - May use tylenol or Advil for pain  - Follow up in 1 weeks  - Watch for signs of fever, chills, warmth, redness, or drainage  A slight amount is normal for a day or two following surgery  Seek medical attention for: fever >101 5 F, increasing warmth, reddness, swelling, bleeding, or anything that may concern you  How to contact us:    Phone: If you have questions or concerns, please call us at [phone] during business hours (8 am to 5 pm)  On nights and weekends, you may page the ENT surgeon on call  at [facility]   In case of emergency, please call [phone]

## 2023-06-14 NOTE — ANESTHESIA POSTPROCEDURE EVALUATION
"Post-Op Assessment Note    CV Status:  Stable    Pain management: adequate     Mental Status:  Alert and awake   Hydration Status:  Euvolemic   PONV Controlled:  Controlled   Airway Patency:  Patent      Post Op Vitals Reviewed: Yes      Staff: Anesthesiologist         No notable events documented      BP      Temp      Pulse     Resp      SpO2      /72   Pulse 86   Temp 97 5 °F (36 4 °C)   Resp 16   Ht 6' 2\" (1 88 m)   Wt 107 kg (235 lb)   SpO2 95%   BMI 30 17 kg/m²     "

## 2023-06-14 NOTE — H&P
Surgery Pre-op note/Updated History and Physical    Date of service: 6/14/202311:58 AM      No changes from most recent clinic H&P note  Patient to OR for left SMG  Pmh: Reviewed  Pshx: Reviewed  Social history: Reviewed  Medications: Reviewed  ROS: as above      Vitals:    06/14/23 0857   BP: 138/88   Pulse: 84   Resp: 16   Temp: 98 °F (36 7 °C)   SpO2: 94%       ENT: Large left SMG  Chest/Heart/Lungs: Breathing, perfused, unremarkable  Abd: Unremarkable  Ext: Unremarkable        The procedure was discussed with the patient, including risks, benefits, and alternatives and all questions were answered  Consent signed and in the chart      James Brewster MD MPH  Otolaryngology--Head and Neck Surgery  [employer]  6/14/2023 11:58 AM\

## 2023-06-19 PROCEDURE — 88307 TISSUE EXAM BY PATHOLOGIST: CPT | Performed by: STUDENT IN AN ORGANIZED HEALTH CARE EDUCATION/TRAINING PROGRAM

## 2023-06-19 PROCEDURE — 88342 IMHCHEM/IMCYTCHM 1ST ANTB: CPT | Performed by: STUDENT IN AN ORGANIZED HEALTH CARE EDUCATION/TRAINING PROGRAM

## 2023-06-19 PROCEDURE — 88364 INSITU HYBRIDIZATION (FISH): CPT | Performed by: STUDENT IN AN ORGANIZED HEALTH CARE EDUCATION/TRAINING PROGRAM

## 2023-06-19 PROCEDURE — 88341 IMHCHEM/IMCYTCHM EA ADD ANTB: CPT | Performed by: STUDENT IN AN ORGANIZED HEALTH CARE EDUCATION/TRAINING PROGRAM

## 2023-06-19 PROCEDURE — 88365 INSITU HYBRIDIZATION (FISH): CPT | Performed by: STUDENT IN AN ORGANIZED HEALTH CARE EDUCATION/TRAINING PROGRAM

## 2023-11-27 ENCOUNTER — APPOINTMENT (OUTPATIENT)
Dept: LAB | Facility: CLINIC | Age: 73
End: 2023-11-27
Payer: COMMERCIAL

## 2023-11-27 DIAGNOSIS — I10 ESSENTIAL HYPERTENSION, MALIGNANT: ICD-10-CM

## 2023-11-27 DIAGNOSIS — E78.00 PURE HYPERCHOLESTEROLEMIA: ICD-10-CM

## 2023-11-27 LAB
ALBUMIN SERPL BCP-MCNC: 4.3 G/DL (ref 3.5–5)
ALP SERPL-CCNC: 67 U/L (ref 34–104)
ALT SERPL W P-5'-P-CCNC: 20 U/L (ref 7–52)
ANION GAP SERPL CALCULATED.3IONS-SCNC: 6 MMOL/L
AST SERPL W P-5'-P-CCNC: 27 U/L (ref 13–39)
BILIRUB SERPL-MCNC: 0.69 MG/DL (ref 0.2–1)
BUN SERPL-MCNC: 20 MG/DL (ref 5–25)
CALCIUM SERPL-MCNC: 9.9 MG/DL (ref 8.4–10.2)
CHLORIDE SERPL-SCNC: 103 MMOL/L (ref 96–108)
CHOLEST SERPL-MCNC: 197 MG/DL
CO2 SERPL-SCNC: 33 MMOL/L (ref 21–32)
CREAT SERPL-MCNC: 0.91 MG/DL (ref 0.6–1.3)
GFR SERPL CREATININE-BSD FRML MDRD: 82 ML/MIN/1.73SQ M
GLUCOSE P FAST SERPL-MCNC: 87 MG/DL (ref 65–99)
HDLC SERPL-MCNC: 56 MG/DL
LDLC SERPL CALC-MCNC: 118 MG/DL (ref 0–100)
NONHDLC SERPL-MCNC: 141 MG/DL
POTASSIUM SERPL-SCNC: 6.2 MMOL/L (ref 3.5–5.3)
PROT SERPL-MCNC: 7.2 G/DL (ref 6.4–8.4)
SODIUM SERPL-SCNC: 142 MMOL/L (ref 135–147)
TRIGL SERPL-MCNC: 114 MG/DL

## 2023-11-27 PROCEDURE — 80053 COMPREHEN METABOLIC PANEL: CPT

## 2023-11-27 PROCEDURE — 36415 COLL VENOUS BLD VENIPUNCTURE: CPT

## 2023-11-27 PROCEDURE — 80061 LIPID PANEL: CPT

## 2024-06-03 ENCOUNTER — APPOINTMENT (OUTPATIENT)
Dept: LAB | Facility: CLINIC | Age: 74
End: 2024-06-03
Payer: COMMERCIAL

## 2024-06-03 DIAGNOSIS — E78.00 PURE HYPERCHOLESTEROLEMIA: ICD-10-CM

## 2024-06-03 DIAGNOSIS — R89.1 ABNORMAL LEVEL OF HORMONES IN SPECIMENS FROM OTH ORG/TISS: ICD-10-CM

## 2024-06-03 DIAGNOSIS — E08.00 DIABETES MELLITUS DUE TO UNDERLYING CONDITION WITH HYPEROSMOLARITY WITHOUT COMA, WITHOUT LONG-TERM CURRENT USE OF INSULIN (HCC): ICD-10-CM

## 2024-06-03 DIAGNOSIS — E55.9 AVITAMINOSIS D: ICD-10-CM

## 2024-06-03 DIAGNOSIS — I10 ESSENTIAL HYPERTENSION, MALIGNANT: ICD-10-CM

## 2024-06-03 DIAGNOSIS — E53.8 BIOTIN-(PROPIONYL-COA-CARBOXYLASE) LIGASE DEFICIENCY: ICD-10-CM

## 2024-06-03 LAB
25(OH)D3 SERPL-MCNC: 79.4 NG/ML (ref 30–100)
ALBUMIN SERPL BCP-MCNC: 4.2 G/DL (ref 3.5–5)
ALP SERPL-CCNC: 64 U/L (ref 34–104)
ALT SERPL W P-5'-P-CCNC: 12 U/L (ref 7–52)
ANION GAP SERPL CALCULATED.3IONS-SCNC: 12 MMOL/L (ref 4–13)
AST SERPL W P-5'-P-CCNC: 19 U/L (ref 13–39)
BACTERIA UR QL AUTO: ABNORMAL /HPF
BASOPHILS # BLD AUTO: 0.02 THOUSANDS/ÂΜL (ref 0–0.1)
BASOPHILS NFR BLD AUTO: 0 % (ref 0–1)
BILIRUB SERPL-MCNC: 0.62 MG/DL (ref 0.2–1)
BILIRUB UR QL STRIP: NEGATIVE
BUDDING YEAST: PRESENT
BUN SERPL-MCNC: 19 MG/DL (ref 5–25)
CALCIUM SERPL-MCNC: 9.4 MG/DL (ref 8.4–10.2)
CAOX CRY URNS QL MICRO: ABNORMAL /HPF
CHLORIDE SERPL-SCNC: 106 MMOL/L (ref 96–108)
CHOLEST SERPL-MCNC: 153 MG/DL
CLARITY UR: CLEAR
CO2 SERPL-SCNC: 28 MMOL/L (ref 21–32)
COLOR UR: YELLOW
CREAT SERPL-MCNC: 0.95 MG/DL (ref 0.6–1.3)
CREAT UR-MCNC: 196.8 MG/DL
EOSINOPHIL # BLD AUTO: 0.44 THOUSAND/ÂΜL (ref 0–0.61)
EOSINOPHIL NFR BLD AUTO: 7 % (ref 0–6)
ERYTHROCYTE [DISTWIDTH] IN BLOOD BY AUTOMATED COUNT: 13.9 % (ref 11.6–15.1)
GFR SERPL CREATININE-BSD FRML MDRD: 77 ML/MIN/1.73SQ M
GLUCOSE P FAST SERPL-MCNC: 106 MG/DL (ref 65–99)
GLUCOSE UR STRIP-MCNC: NEGATIVE MG/DL
HCT VFR BLD AUTO: 50 % (ref 36.5–49.3)
HDLC SERPL-MCNC: 51 MG/DL
HGB BLD-MCNC: 16.2 G/DL (ref 12–17)
HGB UR QL STRIP.AUTO: NEGATIVE
HYALINE CASTS #/AREA URNS LPF: ABNORMAL /LPF
IMM GRANULOCYTES # BLD AUTO: 0.02 THOUSAND/UL (ref 0–0.2)
IMM GRANULOCYTES NFR BLD AUTO: 0 % (ref 0–2)
KETONES UR STRIP-MCNC: NEGATIVE MG/DL
LDLC SERPL CALC-MCNC: 88 MG/DL (ref 0–100)
LEUKOCYTE ESTERASE UR QL STRIP: NEGATIVE
LYMPHOCYTES # BLD AUTO: 1.87 THOUSANDS/ÂΜL (ref 0.6–4.47)
LYMPHOCYTES NFR BLD AUTO: 29 % (ref 14–44)
MCH RBC QN AUTO: 29.1 PG (ref 26.8–34.3)
MCHC RBC AUTO-ENTMCNC: 32.4 G/DL (ref 31.4–37.4)
MCV RBC AUTO: 90 FL (ref 82–98)
MICROALBUMIN UR-MCNC: 9.6 MG/L
MICROALBUMIN/CREAT 24H UR: 5 MG/G CREATININE (ref 0–30)
MONOCYTES # BLD AUTO: 0.49 THOUSAND/ÂΜL (ref 0.17–1.22)
MONOCYTES NFR BLD AUTO: 8 % (ref 4–12)
MUCOUS THREADS UR QL AUTO: ABNORMAL
NEUTROPHILS # BLD AUTO: 3.67 THOUSANDS/ÂΜL (ref 1.85–7.62)
NEUTS SEG NFR BLD AUTO: 56 % (ref 43–75)
NITRITE UR QL STRIP: NEGATIVE
NON-SQ EPI CELLS URNS QL MICRO: ABNORMAL /HPF
NONHDLC SERPL-MCNC: 102 MG/DL
NRBC BLD AUTO-RTO: 0 /100 WBCS
PH UR STRIP.AUTO: 6.5 [PH]
PLATELET # BLD AUTO: 158 THOUSANDS/UL (ref 149–390)
PMV BLD AUTO: 12.1 FL (ref 8.9–12.7)
POTASSIUM SERPL-SCNC: 3.7 MMOL/L (ref 3.5–5.3)
PROT SERPL-MCNC: 7.1 G/DL (ref 6.4–8.4)
PROT UR STRIP-MCNC: ABNORMAL MG/DL
PSA SERPL-MCNC: 0.45 NG/ML (ref 0–4)
RBC # BLD AUTO: 5.57 MILLION/UL (ref 3.88–5.62)
RBC #/AREA URNS AUTO: ABNORMAL /HPF
SODIUM SERPL-SCNC: 146 MMOL/L (ref 135–147)
SP GR UR STRIP.AUTO: 1.02 (ref 1–1.03)
TESTOST SERPL-MSCNC: 142 NG/DL
TRIGL SERPL-MCNC: 72 MG/DL
TSH SERPL DL<=0.05 MIU/L-ACNC: 4.45 UIU/ML (ref 0.45–4.5)
UROBILINOGEN UR STRIP-ACNC: <2 MG/DL
VIT B12 SERPL-MCNC: 544 PG/ML (ref 180–914)
WBC # BLD AUTO: 6.51 THOUSAND/UL (ref 4.31–10.16)
WBC #/AREA URNS AUTO: ABNORMAL /HPF

## 2024-06-03 PROCEDURE — 80053 COMPREHEN METABOLIC PANEL: CPT

## 2024-06-03 PROCEDURE — 82043 UR ALBUMIN QUANTITATIVE: CPT

## 2024-06-03 PROCEDURE — 82306 VITAMIN D 25 HYDROXY: CPT

## 2024-06-03 PROCEDURE — 82607 VITAMIN B-12: CPT

## 2024-06-03 PROCEDURE — 84443 ASSAY THYROID STIM HORMONE: CPT

## 2024-06-03 PROCEDURE — G0103 PSA SCREENING: HCPCS

## 2024-06-03 PROCEDURE — 80061 LIPID PANEL: CPT

## 2024-06-03 PROCEDURE — 82570 ASSAY OF URINE CREATININE: CPT

## 2024-06-03 PROCEDURE — 81001 URINALYSIS AUTO W/SCOPE: CPT

## 2024-06-03 PROCEDURE — 36415 COLL VENOUS BLD VENIPUNCTURE: CPT

## 2024-06-03 PROCEDURE — 84403 ASSAY OF TOTAL TESTOSTERONE: CPT

## 2024-06-03 PROCEDURE — 85025 COMPLETE CBC W/AUTO DIFF WBC: CPT

## 2024-07-10 ENCOUNTER — OFFICE VISIT (OUTPATIENT)
Dept: CARDIOLOGY CLINIC | Facility: CLINIC | Age: 74
End: 2024-07-10
Payer: COMMERCIAL

## 2024-07-10 VITALS
BODY MASS INDEX: 30.01 KG/M2 | HEART RATE: 77 BPM | WEIGHT: 233.8 LBS | DIASTOLIC BLOOD PRESSURE: 78 MMHG | HEIGHT: 74 IN | SYSTOLIC BLOOD PRESSURE: 152 MMHG

## 2024-07-10 DIAGNOSIS — I10 HYPERTENSION, UNSPECIFIED TYPE: Primary | ICD-10-CM

## 2024-07-10 DIAGNOSIS — I50.20 HFREF (HEART FAILURE WITH REDUCED EJECTION FRACTION) (HCC): ICD-10-CM

## 2024-07-10 PROCEDURE — 93000 ELECTROCARDIOGRAM COMPLETE: CPT | Performed by: INTERNAL MEDICINE

## 2024-07-10 PROCEDURE — 99204 OFFICE O/P NEW MOD 45 MIN: CPT | Performed by: INTERNAL MEDICINE

## 2024-07-10 RX ORDER — TESTOSTERONE 25 MG/2.5G
25 GEL TRANSDERMAL DAILY
COMMUNITY
Start: 2024-06-18

## 2024-07-10 RX ORDER — SACUBITRIL AND VALSARTAN 24; 26 MG/1; MG/1
1 TABLET, FILM COATED ORAL 2 TIMES DAILY
COMMUNITY
Start: 2024-07-09

## 2024-07-10 RX ORDER — METOPROLOL SUCCINATE 25 MG/1
25 TABLET, EXTENDED RELEASE ORAL DAILY
COMMUNITY
Start: 2024-07-08

## 2024-07-10 RX ORDER — APIXABAN 5 MG/1
5 TABLET, FILM COATED ORAL 2 TIMES DAILY
COMMUNITY
Start: 2024-07-08

## 2024-07-10 NOTE — PROGRESS NOTES
Cardiology     Clinic Visit Note  Amol Montes De Oca 74 y.o. male   MRN: [id number]    Assessment and Plan      Problem List Items Addressed This Visit       Hypertension - Primary    Relevant Medications    metoprolol succinate (TOPROL-XL) 25 mg 24 hr tablet    Other Relevant Orders    POCT ECG    AMB extended holter monitor    Echo complete w/ contrast if indicated    HFrEF (heart failure with reduced ejection fraction) (HCC)    Relevant Medications    metoprolol succinate (TOPROL-XL) 25 mg 24 hr tablet    sacubitril-valsartan (Entresto) 24-26 MG TABS    Other Relevant Orders    AMB extended holter monitor    Echo complete w/ contrast if indicated     #1 new onset HFrEF--unknown etiology.  Patient was drink heavily when he was young and currently drinks 1 shots of whiskey a day.  Patient does not smoke.  Patient is euvolemic on examination today.  No signs of acute heart failure.  Patient has been initiated on GDMT today.  Patient need to wait 90 days to see if patient is a candidate for ICD implantation.  Will get echocardiogram.  In 3 months.  Scheduled for cardiac cath to determine if it is ischemic related.  Ambulatory referral for heart failure.    2.  Questionable A-fib--patient was initiated on Eliquis after EKG performed at his PCP office.  ECG at this visit revealed normal sinus rhythm with left bundle.  Patient had Zio patch monitor a week but the result was unavailable at this moment.  Advised patient to continue Eliquis for now and will perform ambulatory Holter monitor with Zio patch for 2 weeks.    Patient was a little anxious and all his question was answered assessed satisfactory.  Advised patient to continue performing regular daily activity.      Health Maintenance:  Schedule cardiac cath  Schedule TTE in 3 months  Referral to heart failure        Chief Complaint: Evaluation for biventricular pacemaker/ICD  Subjective     History of Present Illness:  Patient is a 74-year-old male with a past  medical history of hypertension, hyperlipidemia and HFrEF who presented for consultation for biventricular pacemaker/ICD.  Patient was in his PCP office last week for routine checkup and EKG done at that time revealed arrhythmia possible atrial fibrillation.  Patient was reported to cardiologist Dr. Olivera.  Who performed echocardiogram and revealed low LVEF.  Patient was taking Cardesartan and HCTZ for his blood pressure as these have been switched to Entresto and metoprolol.  Patient feels okay, patient denies shortness of breath, chest pain, palpitation or lightheadedness.  Patient denies leg swelling.  Patient is asked to and able to perform regular daily activities and management of mowing grass.  Patient has not been evaluated for ischemic cardiomyopathy in the past.      Previous Cardiology Workup:  TREADMILL STRESS  No results found for this or any previous visit.     ----------------------------------------------------------------------------------------------  NUCLEAR STRESS TEST: No results found for this or any previous visit.    No results found for this or any previous visit.      --------------------------------------------------------------------------------  CATH:  No results found for this or any previous visit.    --------------------------------------------------------------------------------  ECHO:   No results found for this or any previous visit.    No results found for this or any previous visit.    --------------------------------------------------------------------------------  HOLTER  No results found for this or any previous visit.    --------------------------------------------------------------------------------  CAROTIDS  No results found for this or any previous visit.       ---------------------------------------------------------------------------------  Review of Systems   Constitutional:  Negative for chills and fever.   HENT:  Negative for congestion, rhinorrhea, sneezing and sore  throat.    Eyes:  Negative for pain and discharge.   Respiratory:  Negative for cough, chest tightness, shortness of breath and wheezing.    Cardiovascular:  Negative for chest pain and leg swelling.   Gastrointestinal:  Negative for abdominal pain, nausea and vomiting.   Endocrine: Negative for polydipsia, polyphagia and polyuria.   Genitourinary:  Negative for flank pain, frequency and urgency.   Musculoskeletal:  Negative for arthralgias, back pain and joint swelling.   Skin:  Negative for color change and pallor.   Neurological:  Negative for dizziness, weakness, light-headedness and headaches.   Psychiatric/Behavioral:  Negative for agitation and confusion.          Current Outpatient Medications:     acetaminophen (TYLENOL) 325 mg tablet, Take 650 mg by mouth every 6 (six) hours as needed for mild pain, Disp: , Rfl:     atorvastatin (LIPITOR) 20 mg tablet, Take 20 mg by mouth daily at bedtime, Disp: , Rfl:     CALCIUM-VITAMIN D PO, Take 1 capsule by mouth in the morning, Disp: , Rfl:     Eliquis 5 MG, Take 5 mg by mouth 2 (two) times a day, Disp: , Rfl:     ibuprofen (MOTRIN) 400 mg tablet, Take by mouth every 6 (six) hours as needed for mild pain, Disp: , Rfl:     indomethacin (INDOCIN) 50 mg capsule, Take 50 mg by mouth if needed (takes for gout pain as needed), Disp: , Rfl:     loratadine (CLARITIN) 10 mg tablet, Take 10 mg by mouth daily, Disp: , Rfl:     metoprolol succinate (TOPROL-XL) 25 mg 24 hr tablet, Take 25 mg by mouth daily, Disp: , Rfl:     Multiple Vitamins-Minerals (multivitamin with minerals) tablet, Take 1 tablet by mouth daily, Disp: , Rfl:     sacubitril-valsartan (Entresto) 24-26 MG TABS, Take 1 tablet by mouth 2 (two) times a day, Disp: , Rfl:     testosterone (ANDROGEL) 25 MG/2.5GM (1%) GEL, Place 25 mg on the skin daily, Disp: , Rfl:     VITAMIN D PO, Take 1 capsule by mouth in the morning, Disp: , Rfl:   Past Medical History:   Diagnosis Date    COVID-19     1/2023 recoverd @ home     "GERD (gastroesophageal reflux disease)     Gout     Hyperlipidemia     Hypertension     Serum cholesterol elevated      Past Surgical History:   Procedure Laterality Date    COLONOSCOPY      LIPOMA RESECTION Left     Left lower abdomen/groin area    SUBMANDIBULAR GLAND EXCISION Left 6/14/2023    Procedure: SUBMANDIBULAR GLAND EXICISON;  Surgeon: Andrew Hummel MD;  Location: [facility];  Service: ENT     Social History     Socioeconomic History    Marital status: /Civil Union     Spouse name: Not on file    Number of children: Not on file    Years of education: Not on file    Highest education level: Not on file   Occupational History    Not on file   Tobacco Use    Smoking status: Never    Smokeless tobacco: Never    Tobacco comments:     No smoking in the home   Vaping Use    Vaping status: Never Used   Substance and Sexual Activity    Alcohol use: Yes     Comment: 1 drink a day. varies beer wine or liqour 6/12    Drug use: Never    Sexual activity: Not on file   Other Topics Concern    Not on file   Social History Narrative    Not on file     Social Determinants of Health     Financial Resource Strain: Not on file   Food Insecurity: Not on file   Transportation Needs: Not on file   Physical Activity: Not on file   Stress: Not on file   Social Connections: Not on file   Intimate Partner Violence: Not on file   Housing Stability: Not on file     History reviewed. No pertinent family history.  No Known Allergies    Objective     Vitals:    07/10/24 1536   BP: 152/78   BP Location: Left arm   Patient Position: Sitting   Cuff Size: Standard   Pulse: 77   Weight: 106 kg (233 lb 12.8 oz)   Height: 6' 2\" (1.88 m)       Physical exam:     Physical Exam  HENT:      Head: Normocephalic.   Eyes:      Pupils: Pupils are equal, round, and reactive to light.   Cardiovascular:      Rate and Rhythm: Normal rate and regular rhythm.      Heart sounds: No murmur heard.  Pulmonary:      Effort: Pulmonary effort is " normal. No respiratory distress.      Breath sounds: No wheezing or rales.   Abdominal:      Palpations: Abdomen is soft.      Tenderness: There is no abdominal tenderness.   Musculoskeletal:         General: No swelling. Normal range of motion.      Cervical back: Normal range of motion.      Right lower leg: No edema.      Left lower leg: No edema.   Skin:     General: Skin is warm.   Neurological:      Mental Status: He is alert and oriented to person, place, and time.   Psychiatric:         Mood and Affect: Mood normal.           ==  PLEASE NOTE:  This encounter was completed utilizing the [employer] Direct Speech Voice Recognition Software. Grammatical errors, random word insertions, pronoun errors and incomplete sentences are occasional consequences of the system due to software limitations, ambient noise and hardware issues.These may be missed by proof reading prior to affixing electronic signature. Any questions or concerns about the content, text or information contained within the body of this dictation should be directly addressed to the physician for clarification. Please do not hesitate to call me directly if you have any any questions or concerns.

## 2024-07-11 ENCOUNTER — PREP FOR PROCEDURE (OUTPATIENT)
Dept: CARDIOLOGY CLINIC | Facility: CLINIC | Age: 74
End: 2024-07-11

## 2024-07-11 ENCOUNTER — TELEPHONE (OUTPATIENT)
Dept: CARDIOLOGY CLINIC | Facility: CLINIC | Age: 74
End: 2024-07-11

## 2024-07-11 DIAGNOSIS — I10 HYPERTENSION, UNSPECIFIED TYPE: Primary | ICD-10-CM

## 2024-07-11 DIAGNOSIS — I50.20 HFREF (HEART FAILURE WITH REDUCED EJECTION FRACTION) (HCC): ICD-10-CM

## 2024-07-11 NOTE — TELEPHONE ENCOUNTER
----- Message from Artur Mcfarland MD sent at 7/10/2024  4:27 PM EDT -----  Please schedule cardiac cath .   thanks

## 2024-07-11 NOTE — TELEPHONE ENCOUNTER
Health Maintenance:  Schedule cardiac cath  Schedule TTE in 3 months  Referral to heart failure      Patient scheduled for LHC at [facility] on 7/22/2024  with Dr HELLER..  Patient aware of general instructions, mail out with labs order.   Meds holds:   ENTRESTO to hold the morning of the procedure.  ELIQUIS to hold  the night prior of the procedure and the morning of the procedure.

## 2024-07-11 NOTE — LETTER
2024       Amol Montes De Oca              : 1950        MRN: [id number]  [address]  [facility] [address]     Procedure Name:   CARDIAC  CATHETERIZATION    Procedure date: 2024    Blood work to be done BEFORE 2024    Special Instructions:    Medication hold:   ENTRESTO : DO NOT take this medication the morning of the procedure.  ELIQUIS: DO NOT take this medication the night prior of the procedure and the morning of the procedure.     The hospital will contact you the day prior to your procedure, usually between 4PM - 6PM to instruct you on the time and place to report. If you do not hear from a [facility]'s  by 6PM the evening prior to your procedure, please contact the campus that you are scheduled at.        [facility]: [address] [place] [address] - Same Day Surgery [phone]      You may have NOTHING to eat or drink from midnight the night prior to your procedure including candy & gum.  You may have a SIP of water with your morning medications.   DO NOT stop taking Plavix or Aspirin unless advised otherwise.      Arrange for a responsible person to drive you to and from the hospital.    Please notify us if you have been admitted to the hospital within 30 days, or got a MEW MEDICATION prescribed prior to your procedure.     Please shower your procedure and do not use powders or lotions.    Please notify us if you have been admitted to the hospital within the past 30 days.    Bring a list of daily medications, vitamins, minerals, herbals and nutritional supplements you take. Include dosage and time you take them each day.    If packing an overnight bag, pack minimal clothing, you will be given hospital sleepwear. Do not bring money, valuables or jewelry. Wedding band is OK.    If you use CPAP machine, bring it to the hospital.      Have your Photo ID and Insurance cards with you.    DO NOT take any diabetic medication, including insulin, the morning of the  procedure. Oral diabetic medications may include: Glucophage, Prandin, Glyburide, Micronase, Avandia, Glocovance, Precose, Glynase y Starlix.    You should continue to take your morning dose of heart and/or blood pressure medications with a sip of water UNLESS ADVISED OTHERWISE.            Thank you,   Shruti Ferreira  Surgery Coordinator  [facility]   [address] [place] [id number]  Ph: [phone]

## 2024-07-11 NOTE — TELEPHONE ENCOUNTER
----- Message from Yvonne GURROLA sent at 7/11/2024  2:21 PM EDT -----  No auth need for 2 wk Zio XT.  ----- Message -----  From: Artur Mcfarland MD  Sent: 7/10/2024   4:29 PM EDT  To: Cardiology Ep Clincal    Please mail 2 week zio xt

## 2024-07-15 ENCOUNTER — APPOINTMENT (OUTPATIENT)
Dept: LAB | Facility: CLINIC | Age: 74
End: 2024-07-15
Payer: COMMERCIAL

## 2024-07-15 DIAGNOSIS — I10 HYPERTENSION, UNSPECIFIED TYPE: ICD-10-CM

## 2024-07-15 DIAGNOSIS — I50.20 HFREF (HEART FAILURE WITH REDUCED EJECTION FRACTION) (HCC): ICD-10-CM

## 2024-07-15 LAB
ALBUMIN SERPL BCG-MCNC: 4 G/DL (ref 3.5–5)
ALP SERPL-CCNC: 63 U/L (ref 34–104)
ALT SERPL W P-5'-P-CCNC: 11 U/L (ref 7–52)
ANION GAP SERPL CALCULATED.3IONS-SCNC: 7 MMOL/L (ref 4–13)
AST SERPL W P-5'-P-CCNC: 16 U/L (ref 13–39)
BASOPHILS # BLD AUTO: 0.02 THOUSANDS/ÂΜL (ref 0–0.1)
BASOPHILS NFR BLD AUTO: 0 % (ref 0–1)
BILIRUB SERPL-MCNC: 0.81 MG/DL (ref 0.2–1)
BUN SERPL-MCNC: 14 MG/DL (ref 5–25)
CALCIUM SERPL-MCNC: 9.5 MG/DL (ref 8.4–10.2)
CHLORIDE SERPL-SCNC: 104 MMOL/L (ref 96–108)
CO2 SERPL-SCNC: 32 MMOL/L (ref 21–32)
CREAT SERPL-MCNC: 0.95 MG/DL (ref 0.6–1.3)
EOSINOPHIL # BLD AUTO: 0.35 THOUSAND/ÂΜL (ref 0–0.61)
EOSINOPHIL NFR BLD AUTO: 5 % (ref 0–6)
ERYTHROCYTE [DISTWIDTH] IN BLOOD BY AUTOMATED COUNT: 14.4 % (ref 11.6–15.1)
GFR SERPL CREATININE-BSD FRML MDRD: 77 ML/MIN/1.73SQ M
GLUCOSE P FAST SERPL-MCNC: 102 MG/DL (ref 65–99)
HCT VFR BLD AUTO: 53.1 % (ref 36.5–49.3)
HGB BLD-MCNC: 17.1 G/DL (ref 12–17)
IMM GRANULOCYTES # BLD AUTO: 0.01 THOUSAND/UL (ref 0–0.2)
IMM GRANULOCYTES NFR BLD AUTO: 0 % (ref 0–2)
LYMPHOCYTES # BLD AUTO: 1.93 THOUSANDS/ÂΜL (ref 0.6–4.47)
LYMPHOCYTES NFR BLD AUTO: 28 % (ref 14–44)
MCH RBC QN AUTO: 28.9 PG (ref 26.8–34.3)
MCHC RBC AUTO-ENTMCNC: 32.2 G/DL (ref 31.4–37.4)
MCV RBC AUTO: 90 FL (ref 82–98)
MONOCYTES # BLD AUTO: 0.55 THOUSAND/ÂΜL (ref 0.17–1.22)
MONOCYTES NFR BLD AUTO: 8 % (ref 4–12)
NEUTROPHILS # BLD AUTO: 4.02 THOUSANDS/ÂΜL (ref 1.85–7.62)
NEUTS SEG NFR BLD AUTO: 59 % (ref 43–75)
NRBC BLD AUTO-RTO: 0 /100 WBCS
PLATELET # BLD AUTO: 131 THOUSANDS/UL (ref 149–390)
PMV BLD AUTO: 12.1 FL (ref 8.9–12.7)
POTASSIUM SERPL-SCNC: 4 MMOL/L (ref 3.5–5.3)
PROT SERPL-MCNC: 6.9 G/DL (ref 6.4–8.4)
RBC # BLD AUTO: 5.91 MILLION/UL (ref 3.88–5.62)
SODIUM SERPL-SCNC: 143 MMOL/L (ref 135–147)
WBC # BLD AUTO: 6.88 THOUSAND/UL (ref 4.31–10.16)

## 2024-07-15 PROCEDURE — 85025 COMPLETE CBC W/AUTO DIFF WBC: CPT

## 2024-07-15 PROCEDURE — 80053 COMPREHEN METABOLIC PANEL: CPT

## 2024-07-15 PROCEDURE — 36415 COLL VENOUS BLD VENIPUNCTURE: CPT

## 2024-07-15 NOTE — TELEPHONE ENCOUNTER
Joon Bahena,    Just spoke with pt's spouse. The pt is scheduled for a cardiac cath on 07/22/24 and they were questioning if to place the Zio monitor before or after the procedure. I advised to place the Zio monitor on 07/23/24 after his cardiac cath.     Dr. Mcfarland,  Just to make you aware he will start the 2 week Zio XT on 07/23/24.

## 2024-07-15 NOTE — TELEPHONE ENCOUNTER
Joon Bar, are you able to call patient to go over ZIO instructions.  He called Saturday and Left message about be confused about  this.  Thank you.

## 2024-07-18 ENCOUNTER — HOSPITAL ENCOUNTER (EMERGENCY)
Facility: HOSPITAL | Age: 74
Discharge: HOME | End: 2024-07-18
Attending: EMERGENCY MEDICINE
Payer: COMMERCIAL

## 2024-07-18 ENCOUNTER — APPOINTMENT (EMERGENCY)
Dept: RADIOLOGY | Facility: HOSPITAL | Age: 74
End: 2024-07-18
Payer: COMMERCIAL

## 2024-07-18 VITALS
SYSTOLIC BLOOD PRESSURE: 133 MMHG | RESPIRATION RATE: 18 BRPM | BODY MASS INDEX: 30.17 KG/M2 | WEIGHT: 235.01 LBS | TEMPERATURE: 98.2 F | HEART RATE: 76 BPM | DIASTOLIC BLOOD PRESSURE: 72 MMHG | OXYGEN SATURATION: 93 %

## 2024-07-18 DIAGNOSIS — R07.9 CHEST PAIN: Primary | ICD-10-CM

## 2024-07-18 LAB
2HR DELTA HS TROPONIN: -2 NG/L
ANION GAP SERPL CALCULATED.3IONS-SCNC: 7 MMOL/L (ref 4–13)
APTT PPP: 32 SECONDS (ref 23–37)
ATRIAL RATE: 60 BPM
ATRIAL RATE: 70 BPM
ATRIAL RATE: 81 BPM
BASOPHILS # BLD AUTO: 0.02 THOUSANDS/ÂΜL (ref 0–0.1)
BASOPHILS NFR BLD AUTO: 0 % (ref 0–1)
BUN SERPL-MCNC: 18 MG/DL (ref 5–25)
CALCIUM SERPL-MCNC: 9.6 MG/DL (ref 8.4–10.2)
CARDIAC TROPONIN I PNL SERPL HS: 11 NG/L
CARDIAC TROPONIN I PNL SERPL HS: 11 NG/L (ref 8–18)
CARDIAC TROPONIN I PNL SERPL HS: 9 NG/L
CHLORIDE SERPL-SCNC: 105 MMOL/L (ref 96–108)
CO2 SERPL-SCNC: 31 MMOL/L (ref 21–32)
CREAT SERPL-MCNC: 1.1 MG/DL (ref 0.6–1.3)
EOSINOPHIL # BLD AUTO: 0.37 THOUSAND/ÂΜL (ref 0–0.61)
EOSINOPHIL NFR BLD AUTO: 5 % (ref 0–6)
ERYTHROCYTE [DISTWIDTH] IN BLOOD BY AUTOMATED COUNT: 14.2 % (ref 11.6–15.1)
GFR SERPL CREATININE-BSD FRML MDRD: 64 ML/MIN/1.73SQ M
GLUCOSE SERPL-MCNC: 102 MG/DL (ref 65–140)
HCT VFR BLD AUTO: 51.5 % (ref 36.5–49.3)
HGB BLD-MCNC: 16.9 G/DL (ref 12–17)
IMM GRANULOCYTES # BLD AUTO: 0.02 THOUSAND/UL (ref 0–0.2)
IMM GRANULOCYTES NFR BLD AUTO: 0 % (ref 0–2)
INR PPP: 1.1 (ref 0.84–1.19)
LYMPHOCYTES # BLD AUTO: 2.29 THOUSANDS/ÂΜL (ref 0.6–4.47)
LYMPHOCYTES NFR BLD AUTO: 30 % (ref 14–44)
MCH RBC QN AUTO: 28.9 PG (ref 26.8–34.3)
MCHC RBC AUTO-ENTMCNC: 32.8 G/DL (ref 31.4–37.4)
MCV RBC AUTO: 88 FL (ref 82–98)
MONOCYTES # BLD AUTO: 0.65 THOUSAND/ÂΜL (ref 0.17–1.22)
MONOCYTES NFR BLD AUTO: 9 % (ref 4–12)
NEUTROPHILS # BLD AUTO: 4.2 THOUSANDS/ÂΜL (ref 1.85–7.62)
NEUTS SEG NFR BLD AUTO: 56 % (ref 43–75)
NRBC BLD AUTO-RTO: 0 /100 WBCS
P AXIS: -78 DEGREES
P AXIS: 78 DEGREES
P AXIS: 81 DEGREES
PLATELET # BLD AUTO: 184 THOUSANDS/UL (ref 149–390)
PMV BLD AUTO: 10.7 FL (ref 8.9–12.7)
POTASSIUM SERPL-SCNC: 3.4 MMOL/L (ref 3.5–5.3)
PR INTERVAL: 160 MS
PR INTERVAL: 172 MS
PR INTERVAL: 186 MS
PROTHROMBIN TIME: 14.4 SECONDS (ref 11.6–14.5)
QRS AXIS: 79 DEGREES
QRS AXIS: 92 DEGREES
QRS AXIS: 93 DEGREES
QRSD INTERVAL: 170 MS
QT INTERVAL: 426 MS
QT INTERVAL: 434 MS
QT INTERVAL: 472 MS
QTC INTERVAL: 468 MS
QTC INTERVAL: 472 MS
QTC INTERVAL: 494 MS
RBC # BLD AUTO: 5.85 MILLION/UL (ref 3.88–5.62)
SODIUM SERPL-SCNC: 143 MMOL/L (ref 135–147)
T WAVE AXIS: -30 DEGREES
T WAVE AXIS: 112 DEGREES
T WAVE AXIS: 173 DEGREES
VENTRICULAR RATE: 60 BPM
VENTRICULAR RATE: 70 BPM
VENTRICULAR RATE: 81 BPM
WBC # BLD AUTO: 7.55 THOUSAND/UL (ref 4.31–10.16)

## 2024-07-18 PROCEDURE — 85610 PROTHROMBIN TIME: CPT | Performed by: PHYSICIAN ASSISTANT

## 2024-07-18 PROCEDURE — 93010 ELECTROCARDIOGRAM REPORT: CPT | Performed by: STUDENT IN AN ORGANIZED HEALTH CARE EDUCATION/TRAINING PROGRAM

## 2024-07-18 PROCEDURE — 85730 THROMBOPLASTIN TIME PARTIAL: CPT | Performed by: PHYSICIAN ASSISTANT

## 2024-07-18 PROCEDURE — 93005 ELECTROCARDIOGRAM TRACING: CPT

## 2024-07-18 PROCEDURE — 36415 COLL VENOUS BLD VENIPUNCTURE: CPT | Performed by: PHYSICIAN ASSISTANT

## 2024-07-18 PROCEDURE — 99285 EMERGENCY DEPT VISIT HI MDM: CPT | Performed by: PHYSICIAN ASSISTANT

## 2024-07-18 PROCEDURE — 80048 BASIC METABOLIC PNL TOTAL CA: CPT | Performed by: PHYSICIAN ASSISTANT

## 2024-07-18 PROCEDURE — 85025 COMPLETE CBC W/AUTO DIFF WBC: CPT | Performed by: PHYSICIAN ASSISTANT

## 2024-07-18 PROCEDURE — 99215 OFFICE O/P EST HI 40 MIN: CPT | Performed by: INTERNAL MEDICINE

## 2024-07-18 PROCEDURE — 84484 ASSAY OF TROPONIN QUANT: CPT | Performed by: PHYSICIAN ASSISTANT

## 2024-07-18 PROCEDURE — 93010 ELECTROCARDIOGRAM REPORT: CPT | Performed by: INTERNAL MEDICINE

## 2024-07-18 PROCEDURE — 99285 EMERGENCY DEPT VISIT HI MDM: CPT

## 2024-07-18 PROCEDURE — 71045 X-RAY EXAM CHEST 1 VIEW: CPT

## 2024-07-18 RX ADMIN — METOPROLOL TARTRATE 25 MG: 25 TABLET, FILM COATED ORAL at 09:30

## 2024-07-18 RX ADMIN — APIXABAN 5 MG: 5 TABLET, FILM COATED ORAL at 09:30

## 2024-07-18 RX ADMIN — SACUBITRIL AND VALSARTAN 1 TABLET: 24; 26 TABLET, FILM COATED ORAL at 09:30

## 2024-07-18 NOTE — ED NOTES
Patient ambulatory very well with steady gait. Complains of no chest pain while walking.      Derek Minor  07/18/24 9900

## 2024-07-18 NOTE — DISCHARGE INSTRUCTIONS
Please refer to the attached information for strict return instructions. If symptoms worsen or new symptoms develop please return to the ER. Please follow up on 7/22 for cardiac catheterization as scheduled.

## 2024-07-18 NOTE — CONSULTS
Cardiology Consultation  MD Jn Smalls MD, FAC  Andrew Rodgers DO, [facility]  MD Grace Sanchez DO, [facility]  Da Madrid DO, [facility]  ----------------------------------------------------------------  [id number] [place] [address]    Amol Montes De Oca 74 y.o. male MRN: [id number]  Unit/Bed#: [facility] Encounter: [id number]      Reason For Consult: Chest Pain    Assessment & Plan:  Precordial chest pain likely musculoskeletal  Heart failure with reduced ejection fraction  LVEF 30 to 35% by echocardiogram from Dr. Olivera; records pending  Paroxysmal atrial fibrillation on Eliquis  Hypertension  Dyslipidemia  Chronic LBBB with  ms  Hypokalemia    PLAN:  Patient had chest discomfort that sounds musculoskeletal  Discomfort was sharp and pinpoint in nature  Symptoms did not worsen with physical activity or change with rest  Cardiac enzymes were negative over 3 sets and ECG showed chronic LBBB; doubt ACS  If patient ambulates without any exertional symptoms, likely reasonable for discharge from CV perspective  Continue Entresto and Toprol-XL  Would defer initiation of Jardiance and spironolactone to the outpatient setting if BP can tolerate  Will continue with plan for invasive coronary angiography on Monday  Should the patient have any further symptoms, recommend seeking immediate medical attention/dial 911  Continue statin  Will see further as needed; please reconsult with questions    -----------------------------  History Of Present Illness:  Amol Montes De Oca is a 74 year old with hypertension, HFrEF, hyperlipidemia, and gout who presented with complaints of chest pain. He states that the chest pain began early this morning and woke him up from his sleep. The pain lasted for a few minutes but has resolved now. He rated the pain a 3-4/10. It was described as a sharp sensation on the left side of the chest.  Nothing seems to make it better or worse  and it did not change with physical activity.  It was not reproducible to palpation or rotation of the torso.  After several minutes, it did not resolve but spontaneously resolved on its own.  He subsequently presented to [facility] for evaluation.  Cardiac enzymes were found to be negative over multiple sets.  ECG demonstrated chronic left bundle branch block.  We were consulted due to the patient's chest discomfort and known cardiomyopathy.  He is currently symptom-free.  Denies lower extremity 1, orthopnea or paroxysmal nocturnal dyspnea.  Denies lightheadedness, dizziness or palpitations.        Review of systems:  Review of Systems   Constitutional: Negative for decreased appetite, fever, weight gain and weight loss.   HENT:  Negative for congestion and sore throat.    Eyes:  Negative for visual disturbance.   Cardiovascular:  Positive for chest pain. Negative for dyspnea on exertion, leg swelling, near-syncope and palpitations.   Respiratory:  Negative for cough and shortness of breath.    Hematologic/Lymphatic: Negative for bleeding problem.   Skin:  Negative for rash.   Musculoskeletal:  Negative for myalgias and neck pain.   Gastrointestinal:  Negative for abdominal pain and nausea.   Neurological:  Negative for light-headedness and weakness.   Psychiatric/Behavioral:  Negative for depression.           Past Medical History:        Past Medical History:   Diagnosis Date    COVID-19     1/2023 recoverd @ home    GERD (gastroesophageal reflux disease)     Gout     Hyperlipidemia     Hypertension     Serum cholesterol elevated       Past Surgical History:   Procedure Laterality Date    COLONOSCOPY      LIPOMA RESECTION Left     Left lower abdomen/groin area    SUBMANDIBULAR GLAND EXCISION Left 6/14/2023    Procedure: SUBMANDIBULAR GLAND EXICISON;  Surgeon: Andrew Hummel MD;  Location: [facility] OR;  Service: ENT       Family History:     History reviewed. No pertinent family history.      Social History:       Social History     Socioeconomic History    Marital status: /Civil Union     Spouse name: None    Number of children: None    Years of education: None    Highest education level: None   Occupational History    None   Tobacco Use    Smoking status: Never    Smokeless tobacco: Never    Tobacco comments:     No smoking in the home   Vaping Use    Vaping status: Never Used   Substance and Sexual Activity    Alcohol use: Yes     Comment: 1 drink a day. varies beer wine or liqour 6/12    Drug use: Never    Sexual activity: None   Other Topics Concern    None   Social History Narrative    None     Social Determinants of Health     Financial Resource Strain: Not on file   Food Insecurity: Not on file   Transportation Needs: Not on file   Physical Activity: Not on file   Stress: Not on file   Social Connections: Not on file   Intimate Partner Violence: Not on file   Housing Stability: Not on file        Allergy:        No Known Allergies    Medications:       Prior to Admission medications    Medication Sig Start Date End Date Taking? Authorizing Provider   acetaminophen (TYLENOL) 325 mg tablet Take 650 mg by mouth every 6 (six) hours as needed for mild pain    Historical Provider, MD   atorvastatin (LIPITOR) 20 mg tablet Take 20 mg by mouth daily at bedtime    Historical Provider, MD   CALCIUM-VITAMIN D PO Take 1 capsule by mouth in the morning    Historical Provider, MD   Eliquis 5 MG Take 5 mg by mouth 2 (two) times a day 7/8/24   Historical Provider, MD   ibuprofen (MOTRIN) 400 mg tablet Take by mouth every 6 (six) hours as needed for mild pain    Historical Provider, MD   indomethacin (INDOCIN) 50 mg capsule Take 50 mg by mouth if needed (takes for gout pain as needed)    Historical Provider, MD   loratadine (CLARITIN) 10 mg tablet Take 10 mg by mouth daily    Historical Provider, MD   metoprolol succinate (TOPROL-XL) 25 mg 24 hr tablet Take 25 mg by mouth daily 7/8/24   Historical  Provider, MD   Multiple Vitamins-Minerals (multivitamin with minerals) tablet Take 1 tablet by mouth daily    Historical Provider, MD   sacubitril-valsartan (Entresto) 24-26 MG TABS Take 1 tablet by mouth 2 (two) times a day 7/9/24   Historical Provider, MD   testosterone (ANDROGEL) 25 MG/2.5GM (1%) GEL Place 25 mg on the skin daily 6/18/24   Historical Provider, MD   VITAMIN D PO Take 1 capsule by mouth in the morning    Historical Provider, MD       Vitals:    07/18/24 0635 07/18/24 0813   BP: (!) 169/103 160/83   BP Location: Right arm Right arm   Pulse: 87 77   Resp: 18 18   Temp: 98.2 °F (36.8 °C)    TempSrc: Oral    SpO2: 99% 93%   Weight: 107 kg (235 lb 0.2 oz)        PHYSICAL EXAMINATION:  Gen: Awake, Alert, NAD  Head/eyes: AT/NC, pupils equal and round, Anicteric  ENT: mmm  Neck: Supple, No elevated JVP, trachea midline  Resp: CTA bilaterally no w/r/r  CV: RRR +S1, S2, No m/r/g  Abd: Soft, NT/ND + BS  Ext: no LE edema bilaterally  Neuro: Follows commands, moves all extermities  Psych: Appropriate affect, normal mood, pleasant attitude, non-combative  Skin: warm; no rash, erythema or venous stasis changes on exposed skin       Telemetry:   Sinus Rhythm with PACs and PVCs. HR 70-80          Weights:    Wt Readings from Last 10 Encounters:   07/18/24 107 kg (235 lb 0.2 oz)   07/10/24 106 kg (233 lb 12.8 oz)   06/29/23 107 kg (235 lb)   06/23/23 107 kg (235 lb)   06/21/23 107 kg (235 lb)   06/08/23 107 kg (235 lb)   02/14/23 109 kg (240 lb)          Lab Studies:        Lab Results:  Results from last 7 days   Lab Units 07/18/24  0656   WBC Thousand/uL 7.55   HEMOGLOBIN g/dL 16.9   HEMATOCRIT % 51.5*   PLATELETS Thousands/uL 184     Results from last 7 days   Lab Units 07/18/24  0656 07/15/24  0919   POTASSIUM mmol/L 3.4* 4.0   CHLORIDE mmol/L 105 104   CO2 mmol/L 31 32   BUN mg/dL 18 14   CREATININE mg/dL 1.10 0.95   CALCIUM mg/dL 9.6 9.5   ALK PHOS U/L  --  63   ALT U/L  --  11   AST U/L  --  16     No results  "found for: \"TROPONINT\"      Results from last 7 days   Lab Units 07/18/24  1114 07/18/24  0900 07/18/24  0656   HS TNI 0HR ng/L  --   --  11   HS TNI 2HR ng/L  --  9  --    HS TNI RAND ng/L 11  --   --          Results from last 7 days   Lab Units 07/18/24  0656   INR  1.10             CXR: No results found for this or any previous visit.    Results for orders placed during the hospital encounter of 07/18/24    XR chest 1 view portable    Narrative  XR CHEST PORTABLE    INDICATION: Chest pain.    COMPARISON: None    FINDINGS:    No focal consolidation. No pneumothorax or pleural effusion.    Normal cardiomediastinal silhouette.    Bones are unremarkable for age.    Normal upper abdomen.    Impression  No focal consolidation, pleural effusion, or pneumothorax.        Workstation performed: [id number]      ECG: Sinus rhythm LBBB with PACs    This note was completed in part utilizing M-Modal Fluency Direct Software.  Grammatical errors, random word insertions, spelling mistakes, and incomplete sentences may be an occasional consequence of this system secondary to software limitations, ambient noise, and hardware issues.  If you have any questions or concerns about the content, text, or information contained within the body of this dictation, please contact the provider for clarification.                "

## 2024-07-18 NOTE — H&P (VIEW-ONLY)
Cardiology Consultation  MD Jn Smalls MD, FAC  Andrew Rodgers DO, [facility]  MD Grace Sanchez DO, [facility]  Da Madrid DO, [facility]  ----------------------------------------------------------------  [id number] [place] [address]    Amol Montes De Oca 74 y.o. male MRN: [id number]  Unit/Bed#: [facility] Encounter: [id number]      Reason For Consult: Chest Pain    Assessment & Plan:  Precordial chest pain likely musculoskeletal  Heart failure with reduced ejection fraction  LVEF 30 to 35% by echocardiogram from Dr. Olivera; records pending  Paroxysmal atrial fibrillation on Eliquis  Hypertension  Dyslipidemia  Chronic LBBB with  ms  Hypokalemia    PLAN:  Patient had chest discomfort that sounds musculoskeletal  Discomfort was sharp and pinpoint in nature  Symptoms did not worsen with physical activity or change with rest  Cardiac enzymes were negative over 3 sets and ECG showed chronic LBBB; doubt ACS  If patient ambulates without any exertional symptoms, likely reasonable for discharge from CV perspective  Continue Entresto and Toprol-XL  Would defer initiation of Jardiance and spironolactone to the outpatient setting if BP can tolerate  Will continue with plan for invasive coronary angiography on Monday  Should the patient have any further symptoms, recommend seeking immediate medical attention/dial 911  Continue statin  Will see further as needed; please reconsult with questions    -----------------------------  History Of Present Illness:  Amol Montes De Oca is a 74 year old with hypertension, HFrEF, hyperlipidemia, and gout who presented with complaints of chest pain. He states that the chest pain began early this morning and woke him up from his sleep. The pain lasted for a few minutes but has resolved now. He rated the pain a 3-4/10. It was described as a sharp sensation on the left side of the chest.  Nothing seems to make it better or worse  and it did not change with physical activity.  It was not reproducible to palpation or rotation of the torso.  After several minutes, it did not resolve but spontaneously resolved on its own.  He subsequently presented to [facility] for evaluation.  Cardiac enzymes were found to be negative over multiple sets.  ECG demonstrated chronic left bundle branch block.  We were consulted due to the patient's chest discomfort and known cardiomyopathy.  He is currently symptom-free.  Denies lower extremity 1, orthopnea or paroxysmal nocturnal dyspnea.  Denies lightheadedness, dizziness or palpitations.        Review of systems:  Review of Systems   Constitutional: Negative for decreased appetite, fever, weight gain and weight loss.   HENT:  Negative for congestion and sore throat.    Eyes:  Negative for visual disturbance.   Cardiovascular:  Positive for chest pain. Negative for dyspnea on exertion, leg swelling, near-syncope and palpitations.   Respiratory:  Negative for cough and shortness of breath.    Hematologic/Lymphatic: Negative for bleeding problem.   Skin:  Negative for rash.   Musculoskeletal:  Negative for myalgias and neck pain.   Gastrointestinal:  Negative for abdominal pain and nausea.   Neurological:  Negative for light-headedness and weakness.   Psychiatric/Behavioral:  Negative for depression.           Past Medical History:        Past Medical History:   Diagnosis Date    COVID-19     1/2023 recoverd @ home    GERD (gastroesophageal reflux disease)     Gout     Hyperlipidemia     Hypertension     Serum cholesterol elevated       Past Surgical History:   Procedure Laterality Date    COLONOSCOPY      LIPOMA RESECTION Left     Left lower abdomen/groin area    SUBMANDIBULAR GLAND EXCISION Left 6/14/2023    Procedure: SUBMANDIBULAR GLAND EXICISON;  Surgeon: Andrew Hummel MD;  Location: [facility] OR;  Service: ENT       Family History:     History reviewed. No pertinent family history.      Social History:       Social History     Socioeconomic History    Marital status: /Civil Union     Spouse name: None    Number of children: None    Years of education: None    Highest education level: None   Occupational History    None   Tobacco Use    Smoking status: Never    Smokeless tobacco: Never    Tobacco comments:     No smoking in the home   Vaping Use    Vaping status: Never Used   Substance and Sexual Activity    Alcohol use: Yes     Comment: 1 drink a day. varies beer wine or liqour 6/12    Drug use: Never    Sexual activity: None   Other Topics Concern    None   Social History Narrative    None     Social Determinants of Health     Financial Resource Strain: Not on file   Food Insecurity: Not on file   Transportation Needs: Not on file   Physical Activity: Not on file   Stress: Not on file   Social Connections: Not on file   Intimate Partner Violence: Not on file   Housing Stability: Not on file        Allergy:        No Known Allergies    Medications:       Prior to Admission medications    Medication Sig Start Date End Date Taking? Authorizing Provider   acetaminophen (TYLENOL) 325 mg tablet Take 650 mg by mouth every 6 (six) hours as needed for mild pain    Historical Provider, MD   atorvastatin (LIPITOR) 20 mg tablet Take 20 mg by mouth daily at bedtime    Historical Provider, MD   CALCIUM-VITAMIN D PO Take 1 capsule by mouth in the morning    Historical Provider, MD   Eliquis 5 MG Take 5 mg by mouth 2 (two) times a day 7/8/24   Historical Provider, MD   ibuprofen (MOTRIN) 400 mg tablet Take by mouth every 6 (six) hours as needed for mild pain    Historical Provider, MD   indomethacin (INDOCIN) 50 mg capsule Take 50 mg by mouth if needed (takes for gout pain as needed)    Historical Provider, MD   loratadine (CLARITIN) 10 mg tablet Take 10 mg by mouth daily    Historical Provider, MD   metoprolol succinate (TOPROL-XL) 25 mg 24 hr tablet Take 25 mg by mouth daily 7/8/24   Historical  Provider, MD   Multiple Vitamins-Minerals (multivitamin with minerals) tablet Take 1 tablet by mouth daily    Historical Provider, MD   sacubitril-valsartan (Entresto) 24-26 MG TABS Take 1 tablet by mouth 2 (two) times a day 7/9/24   Historical Provider, MD   testosterone (ANDROGEL) 25 MG/2.5GM (1%) GEL Place 25 mg on the skin daily 6/18/24   Historical Provider, MD   VITAMIN D PO Take 1 capsule by mouth in the morning    Historical Provider, MD       Vitals:    07/18/24 0635 07/18/24 0813   BP: (!) 169/103 160/83   BP Location: Right arm Right arm   Pulse: 87 77   Resp: 18 18   Temp: 98.2 °F (36.8 °C)    TempSrc: Oral    SpO2: 99% 93%   Weight: 107 kg (235 lb 0.2 oz)        PHYSICAL EXAMINATION:  Gen: Awake, Alert, NAD  Head/eyes: AT/NC, pupils equal and round, Anicteric  ENT: mmm  Neck: Supple, No elevated JVP, trachea midline  Resp: CTA bilaterally no w/r/r  CV: RRR +S1, S2, No m/r/g  Abd: Soft, NT/ND + BS  Ext: no LE edema bilaterally  Neuro: Follows commands, moves all extermities  Psych: Appropriate affect, normal mood, pleasant attitude, non-combative  Skin: warm; no rash, erythema or venous stasis changes on exposed skin       Telemetry:   Sinus Rhythm with PACs and PVCs. HR 70-80          Weights:    Wt Readings from Last 10 Encounters:   07/18/24 107 kg (235 lb 0.2 oz)   07/10/24 106 kg (233 lb 12.8 oz)   06/29/23 107 kg (235 lb)   06/23/23 107 kg (235 lb)   06/21/23 107 kg (235 lb)   06/08/23 107 kg (235 lb)   02/14/23 109 kg (240 lb)          Lab Studies:        Lab Results:  Results from last 7 days   Lab Units 07/18/24  0656   WBC Thousand/uL 7.55   HEMOGLOBIN g/dL 16.9   HEMATOCRIT % 51.5*   PLATELETS Thousands/uL 184     Results from last 7 days   Lab Units 07/18/24  0656 07/15/24  0919   POTASSIUM mmol/L 3.4* 4.0   CHLORIDE mmol/L 105 104   CO2 mmol/L 31 32   BUN mg/dL 18 14   CREATININE mg/dL 1.10 0.95   CALCIUM mg/dL 9.6 9.5   ALK PHOS U/L  --  63   ALT U/L  --  11   AST U/L  --  16     No results  "found for: \"TROPONINT\"      Results from last 7 days   Lab Units 07/18/24  1114 07/18/24  0900 07/18/24  0656   HS TNI 0HR ng/L  --   --  11   HS TNI 2HR ng/L  --  9  --    HS TNI RAND ng/L 11  --   --          Results from last 7 days   Lab Units 07/18/24  0656   INR  1.10             CXR: No results found for this or any previous visit.    Results for orders placed during the hospital encounter of 07/18/24    XR chest 1 view portable    Narrative  XR CHEST PORTABLE    INDICATION: Chest pain.    COMPARISON: None    FINDINGS:    No focal consolidation. No pneumothorax or pleural effusion.    Normal cardiomediastinal silhouette.    Bones are unremarkable for age.    Normal upper abdomen.    Impression  No focal consolidation, pleural effusion, or pneumothorax.        Workstation performed: [id number]      ECG: Sinus rhythm LBBB with PACs    This note was completed in part utilizing M-Modal Fluency Direct Software.  Grammatical errors, random word insertions, spelling mistakes, and incomplete sentences may be an occasional consequence of this system secondary to software limitations, ambient noise, and hardware issues.  If you have any questions or concerns about the content, text, or information contained within the body of this dictation, please contact the provider for clarification.                "

## 2024-07-18 NOTE — ED PROVIDER NOTES
History  Chief Complaint   Patient presents with    Chest Pain     Pt reports sharp and stabbing cp that began pta; hx of a-fib and heart failure. Pt on thinners. Denies dizziness. Pt scheduled for a cardiac cath on Monday      Amol is a 73 yo M presenting with left sided chest pain which began this am and awoke him from sleep. The pain is well localized, sharp in nature, and non-pleuritic. It does not change with positions. No dyspnea associated. Otherwise asymptomatic at this time. He does note occasional b/l jaw pain over the past 6+ months although this is not with exertion/associated chest discomfort and he attributes to his jaw itself. He has a history of LBBB, suspected a fib currently on Eliquis pending zio patch monitoring, newly diagnosed CHF with most recent EF 30-35%       History provided by:  Patient   used: No        Prior to Admission Medications   Prescriptions Last Dose Informant Patient Reported? Taking?   CALCIUM-VITAMIN D PO  Self Yes No   Sig: Take 1 capsule by mouth in the morning   Eliquis 5 MG   Yes No   Sig: Take 5 mg by mouth 2 (two) times a day   Multiple Vitamins-Minerals (multivitamin with minerals) tablet  Self Yes No   Sig: Take 1 tablet by mouth daily   VITAMIN D PO  Self Yes No   Sig: Take 1 capsule by mouth in the morning   acetaminophen (TYLENOL) 325 mg tablet  Self Yes No   Sig: Take 650 mg by mouth every 6 (six) hours as needed for mild pain   atorvastatin (LIPITOR) 20 mg tablet  Self Yes No   Sig: Take 20 mg by mouth daily at bedtime   ibuprofen (MOTRIN) 400 mg tablet  Self Yes No   Sig: Take by mouth every 6 (six) hours as needed for mild pain   indomethacin (INDOCIN) 50 mg capsule  Self Yes No   Sig: Take 50 mg by mouth if needed (takes for gout pain as needed)   loratadine (CLARITIN) 10 mg tablet  Self Yes No   Sig: Take 10 mg by mouth daily   metoprolol succinate (TOPROL-XL) 25 mg 24 hr tablet   Yes No   Sig: Take 25 mg by mouth daily    sacubitril-valsartan (Entresto) 24-26 MG TABS   Yes No   Sig: Take 1 tablet by mouth 2 (two) times a day   testosterone (ANDROGEL) 25 MG/2.5GM (1%) GEL   Yes No   Sig: Place 25 mg on the skin daily      Facility-Administered Medications: None       Past Medical History:   Diagnosis Date    COVID-19     1/2023 recoverd @ home    GERD (gastroesophageal reflux disease)     Gout     Hyperlipidemia     Hypertension     Serum cholesterol elevated        Past Surgical History:   Procedure Laterality Date    COLONOSCOPY      LIPOMA RESECTION Left     Left lower abdomen/groin area    SUBMANDIBULAR GLAND EXCISION Left 6/14/2023    Procedure: SUBMANDIBULAR GLAND EXICISON;  Surgeon: Andrew Hummel MD;  Location: [facility];  Service: ENT       History reviewed. No pertinent family history.  I have reviewed and agree with the history as documented.    E-Cigarette/Vaping    E-Cigarette Use Never User      E-Cigarette/Vaping Substances    Nicotine No     THC No     CBD No      Social History     Tobacco Use    Smoking status: Never    Smokeless tobacco: Never    Tobacco comments:     No smoking in the home   Vaping Use    Vaping status: Never Used   Substance Use Topics    Alcohol use: Yes     Comment: 1 drink a day. varies beer wine or liqour 6/12    Drug use: Never       Review of Systems   Constitutional:  Negative for chills and fever.   HENT:  Negative for congestion, rhinorrhea and sore throat.    Eyes:  Negative for pain and visual disturbance.   Respiratory:  Negative for cough, shortness of breath and wheezing.    Cardiovascular:  Positive for chest pain. Negative for palpitations and leg swelling.   Gastrointestinal:  Negative for abdominal pain, diarrhea, nausea and vomiting.   Genitourinary:  Negative for dysuria, frequency and urgency.   Musculoskeletal:  Negative for back pain, neck pain and neck stiffness.   Skin:  Negative for rash and wound.   Neurological:  Negative for dizziness, weakness,  light-headedness and numbness.       Physical Exam  Physical Exam  Constitutional:       General: He is not in acute distress.     Appearance: He is well-developed. He is not diaphoretic.   HENT:      Head: Normocephalic and atraumatic.      Right Ear: External ear normal.      Left Ear: External ear normal.   Eyes:      Extraocular Movements:      Right eye: Normal extraocular motion.      Left eye: Normal extraocular motion.      Conjunctiva/sclera: Conjunctivae normal.      Pupils: Pupils are equal, round, and reactive to light.   Cardiovascular:      Rate and Rhythm: Normal rate and regular rhythm.      Comments: No lower extremity edema/calf TTP. Negative Esau's b/l  Pulmonary:      Effort: Pulmonary effort is normal. No accessory muscle usage or respiratory distress.      Breath sounds: No wheezing or rales.   Chest:      Chest wall: No tenderness.   Abdominal:      General: Abdomen is flat. There is no distension.      Tenderness: There is no abdominal tenderness.   Musculoskeletal:      Cervical back: Normal range of motion. No rigidity.   Skin:     General: Skin is warm and dry.      Capillary Refill: Capillary refill takes less than 2 seconds.      Findings: No erythema or rash.   Neurological:      Mental Status: He is alert and oriented to person, place, and time.      Motor: No abnormal muscle tone.      Coordination: Coordination normal.   Psychiatric:         Behavior: Behavior normal.         Thought Content: Thought content normal.         Judgment: Judgment normal.         Vital Signs  ED Triage Vitals [07/18/24 0635]   Temperature Pulse Respirations Blood Pressure SpO2   98.2 °F (36.8 °C) 87 18 (!) 169/103 99 %      Temp Source Heart Rate Source Patient Position - Orthostatic VS BP Location FiO2 (%)   Oral Monitor Lying Right arm --      Pain Score       --           Vitals:    07/18/24 0635 07/18/24 0813 07/18/24 0930 07/18/24 0932   BP: (!) 169/103 160/83 133/72 133/72   Pulse: 87 77 67 76    Patient Position - Orthostatic VS: Lying Sitting  Lying         Visual Acuity      ED Medications  Medications   metoprolol tartrate (LOPRESSOR) tablet 25 mg (25 mg Oral Given 7/18/24 0930)   sacubitril-valsartan (ENTRESTO) 24-26 MG per tablet 1 tablet (1 tablet Oral Given 7/18/24 0930)       Diagnostic Studies  Results Reviewed       Procedure Component Value Units Date/Time    High Sensitivity Troponin I Random [[id number]]  (Normal) Collected: 07/18/24 1114    Lab Status: Final result Specimen: Blood from Arm, Left Updated: 07/18/24 1148     HS TnI random 11 ng/L     HS Troponin I 2hr [[id number]]  (Normal) Collected: 07/18/24 0900    Lab Status: Final result Specimen: Blood from Arm, Left Updated: 07/18/24 0930     hs TnI 2hr 9 ng/L      Delta 2hr hsTnI -2 ng/L     HS Troponin 0hr (reflex protocol) [[id number]]  (Normal) Collected: 07/18/24 0656    Lab Status: Final result Specimen: Blood from Arm, Left Updated: 07/18/24 0727     hs TnI 0hr 11 ng/L     Basic metabolic panel [[id number]]  (Abnormal) Collected: 07/18/24 0656    Lab Status: Final result Specimen: Blood from Arm, Left Updated: 07/18/24 0725     Sodium 143 mmol/L      Potassium 3.4 mmol/L      Chloride 105 mmol/L      CO2 31 mmol/L      ANION GAP 7 mmol/L      BUN 18 mg/dL      Creatinine 1.10 mg/dL      Glucose 102 mg/dL      Calcium 9.6 mg/dL      eGFR 64 ml/min/1.73sq m     Narrative:      [employer] guidelines for Chronic Kidney Disease (CKD):     Stage 1 with normal or high GFR (GFR > 90 mL/min/1.73 square meters)    Stage 2 Mild CKD (GFR = 60-89 mL/min/1.73 square meters)    Stage 3A Moderate CKD (GFR = 45-59 mL/min/1.73 square meters)    Stage 3B Moderate CKD (GFR = 30-44 mL/min/1.73 square meters)    Stage 4 Severe CKD (GFR = 15-29 mL/min/1.73 square meters)    Stage 5 End Stage CKD (GFR <15 mL/min/1.73 square meters)  Note: GFR calculation is accurate only with a steady state creatinine    APTT [[id number]]  (Normal)  Collected: 07/18/24 0656    Lab Status: Final result Specimen: Blood from Arm, Left Updated: 07/18/24 0719     PTT 32 seconds     Protime-INR [[id number]]  (Normal) Collected: 07/18/24 0656    Lab Status: Final result Specimen: Blood from Arm, Left Updated: 07/18/24 0719     Protime 14.4 seconds      INR 1.10    CBC and differential [[id number]]  (Abnormal) Collected: 07/18/24 0656    Lab Status: Final result Specimen: Blood from Arm, Left Updated: 07/18/24 0706     WBC 7.55 Thousand/uL      RBC 5.85 Million/uL      Hemoglobin 16.9 g/dL      Hematocrit 51.5 %      MCV 88 fL      MCH 28.9 pg      MCHC 32.8 g/dL      RDW 14.2 %      MPV 10.7 fL      Platelets 184 Thousands/uL      nRBC 0 /100 WBCs      Segmented % 56 %      Immature Grans % 0 %      Lymphocytes % 30 %      Monocytes % 9 %      Eosinophils Relative 5 %      Basophils Relative 0 %      Absolute Neutrophils 4.20 Thousands/µL      Absolute Immature Grans 0.02 Thousand/uL      Absolute Lymphocytes 2.29 Thousands/µL      Absolute Monocytes 0.65 Thousand/µL      Eosinophils Absolute 0.37 Thousand/µL      Basophils Absolute 0.02 Thousands/µL                    XR chest 1 view portable   Final Result by Sarabjit Prather MD (07/18 0751)      No focal consolidation, pleural effusion, or pneumothorax.            Workstation performed: [id number]                    Procedures  ECG 12 Lead Documentation Only    Date/Time: 7/18/2024 6:35 AM    Performed by: Royal Beyer PA-C  Authorized by: Royal Beyer PA-C    Indications / Diagnosis:  Chest pain  ECG reviewed by me, the ED Provider: yes    Patient location:  ED  Previous ECG:     Previous ECG:  Compared to current    Similarity:  No change    Comparison to cardiac monitor: Yes    Interpretation:     Interpretation: abnormal    Rate:     ECG rate:  81    ECG rate assessment: normal    Rhythm:     Rhythm: sinus rhythm    Ectopy:     Ectopy: PVCs    QRS:     QRS axis:  Right    QRS intervals:   Wide  Conduction:     Conduction: abnormal      Abnormal conduction comment:  LBBB  ECG 12 Lead Documentation Only    Date/Time: 7/18/2024 9:00 AM    Performed by: Royal Beyer PA-C  Authorized by: Royal Beyer PA-C    Indications / Diagnosis:  Chest pain  ECG reviewed by me, the ED Provider: yes    Patient location:  ED  Previous ECG:     Previous ECG:  Compared to current    Similarity:  No change    Comparison to cardiac monitor: Yes    Interpretation:     Interpretation: abnormal    Rate:     ECG rate:  70    ECG rate assessment: normal    Rhythm:     Rhythm: sinus rhythm    Ectopy:     Ectopy: PVCs      PVCs:  Infrequent  QRS:     QRS axis:  Indeterminate    QRS intervals:  Wide  Conduction:     Conduction: abnormal      Abnormal conduction comment:  LBBB           ED Course               HEART Risk Score      Flowsheet Row Most Recent Value   Heart Score Risk Calculator    History 0 Filed at: 07/18/2024 1211   ECG 1 Filed at: 07/18/2024 1211   Age 2 Filed at: 07/18/2024 1211   Risk Factors 2 Filed at: 07/18/2024 1211   Troponin 0 Filed at: 07/18/2024 1211   HEART Score 5 Filed at: 07/18/2024 1211                                        Medical Decision Making  L sided chest pain with onset shortly prior to arrival which awoke him from sleep. Sharp, left sided, non-pleuritic. Not readily reproducible on exam. EKG initially shows what appears to be known LBBB, frequent PVC's, and suspected sinus arrhythmia. Troponins negative x3. No dynamic EKG changes noted. Labs otherwise largely WNL with exception of minimal hypokalemia to 3.4. XR chest clear.     Case discussed with cardiology, Dr. Rodgers. Following consultation feels pain likely to represent non-cardiac chest pain, may follow up as scheduled for cardiac cath. Pt already has outpatient cardiology follow up. Stable for discharge. Strict return to ED indications reviewed.     Amount and/or Complexity of Data Reviewed  Labs: ordered.  Radiology:  ordered.    Risk  Prescription drug management.                 Disposition  Final diagnoses:   Chest pain     Time reflects when diagnosis was documented in both MDM as applicable and the Disposition within this note       Time User Action Codes Description Comment    7/18/2024  8:24 AM Royal Beyer [R07.9] Chest pain           ED Disposition       ED Disposition   Discharge    Condition   Stable    Date/Time   Thu Jul 18, 2024 1211    Comment   Amol Montes De Oca discharge to home/self care.                   Follow-up Information       Follow up With Specialties Details Why Contact Info Additional Information    [facility] Emergency Medicine  If symptoms worsen [address] [facility] [address]  [phone] [facility] Emergency Department, [address], [place], [address]            Discharge Medication List as of 7/18/2024 12:12 PM        CONTINUE these medications which have NOT CHANGED    Details   acetaminophen (TYLENOL) 325 mg tablet Take 650 mg by mouth every 6 (six) hours as needed for mild pain, Historical Med      atorvastatin (LIPITOR) 20 mg tablet Take 20 mg by mouth daily at bedtime, Historical Med      CALCIUM-VITAMIN D PO Take 1 capsule by mouth in the morning, Historical Med      Eliquis 5 MG Take 5 mg by mouth 2 (two) times a day, Starting Mon 7/8/2024, Historical Med      ibuprofen (MOTRIN) 400 mg tablet Take by mouth every 6 (six) hours as needed for mild pain, Historical Med      indomethacin (INDOCIN) 50 mg capsule Take 50 mg by mouth if needed (takes for gout pain as needed), Historical Med      loratadine (CLARITIN) 10 mg tablet Take 10 mg by mouth daily, Historical Med      metoprolol succinate (TOPROL-XL) 25 mg 24 hr tablet Take 25 mg by mouth daily, Starting Mon 7/8/2024, Historical Med      Multiple Vitamins-Minerals (multivitamin with minerals) tablet Take 1 tablet by mouth daily, Historical Med       sacubitril-valsartan (Entresto) 24-26 MG TABS Take 1 tablet by mouth 2 (two) times a day, Starting Tue 7/9/2024, Historical Med      testosterone (ANDROGEL) 25 MG/2.5GM (1%) GEL Place 25 mg on the skin daily, Starting Tue 6/18/2024, Historical Med      VITAMIN D PO Take 1 capsule by mouth in the morning, Historical Med             No discharge procedures on file.    PDMP Review       None            ED Provider  Electronically Signed by             Royal Beyer PA-C  07/18/24 1164

## 2024-07-22 ENCOUNTER — HOSPITAL ENCOUNTER (OUTPATIENT)
Facility: HOSPITAL | Age: 74
Setting detail: OUTPATIENT SURGERY
Discharge: HOME | End: 2024-07-22
Attending: INTERNAL MEDICINE | Admitting: INTERNAL MEDICINE
Payer: COMMERCIAL

## 2024-07-22 VITALS
HEART RATE: 70 BPM | WEIGHT: 233.47 LBS | DIASTOLIC BLOOD PRESSURE: 59 MMHG | TEMPERATURE: 98.4 F | SYSTOLIC BLOOD PRESSURE: 110 MMHG | OXYGEN SATURATION: 94 % | RESPIRATION RATE: 16 BRPM | BODY MASS INDEX: 29.98 KG/M2

## 2024-07-22 DIAGNOSIS — I10 HYPERTENSION, UNSPECIFIED TYPE: ICD-10-CM

## 2024-07-22 DIAGNOSIS — I50.20 HFREF (HEART FAILURE WITH REDUCED EJECTION FRACTION) (HCC): ICD-10-CM

## 2024-07-22 PROBLEM — I25.119 CORONARY ARTERY DISEASE INVOLVING NATIVE CORONARY ARTERY OF NATIVE HEART WITH ANGINA PECTORIS (HCC): Status: ACTIVE | Noted: 2024-07-22

## 2024-07-22 LAB
ATRIAL RATE: 79 BPM
P AXIS: 61 DEGREES
PR INTERVAL: 174 MS
QRS AXIS: -11 DEGREES
QRSD INTERVAL: 166 MS
QT INTERVAL: 434 MS
QTC INTERVAL: 497 MS
T WAVE AXIS: 139 DEGREES
VENTRICULAR RATE: 79 BPM

## 2024-07-22 PROCEDURE — 93005 ELECTROCARDIOGRAM TRACING: CPT

## 2024-07-22 PROCEDURE — 93010 ELECTROCARDIOGRAM REPORT: CPT | Performed by: INTERNAL MEDICINE

## 2024-07-22 PROCEDURE — C1894 INTRO/SHEATH, NON-LASER: HCPCS | Performed by: INTERNAL MEDICINE

## 2024-07-22 PROCEDURE — 99152 MOD SED SAME PHYS/QHP 5/>YRS: CPT | Performed by: INTERNAL MEDICINE

## 2024-07-22 PROCEDURE — C1769 GUIDE WIRE: HCPCS | Performed by: INTERNAL MEDICINE

## 2024-07-22 PROCEDURE — C1887 CATHETER, GUIDING: HCPCS | Performed by: INTERNAL MEDICINE

## 2024-07-22 PROCEDURE — 99153 MOD SED SAME PHYS/QHP EA: CPT | Performed by: INTERNAL MEDICINE

## 2024-07-22 PROCEDURE — 93454 CORONARY ARTERY ANGIO S&I: CPT | Performed by: INTERNAL MEDICINE

## 2024-07-22 RX ORDER — DIPHENHYDRAMINE HYDROCHLORIDE 50 MG/ML
INJECTION INTRAMUSCULAR; INTRAVENOUS CODE/TRAUMA/SEDATION MEDICATION
Status: DISCONTINUED | OUTPATIENT
Start: 2024-07-22 | End: 2024-07-22 | Stop reason: HOSPADM

## 2024-07-22 RX ORDER — ASPIRIN 81 MG/1
324 TABLET, CHEWABLE ORAL ONCE
Status: COMPLETED | OUTPATIENT
Start: 2024-07-22 | End: 2024-07-22

## 2024-07-22 RX ORDER — FENTANYL CITRATE 50 UG/ML
INJECTION, SOLUTION INTRAMUSCULAR; INTRAVENOUS CODE/TRAUMA/SEDATION MEDICATION
Status: DISCONTINUED | OUTPATIENT
Start: 2024-07-22 | End: 2024-07-22 | Stop reason: HOSPADM

## 2024-07-22 RX ORDER — MIDAZOLAM HYDROCHLORIDE 2 MG/2ML
INJECTION, SOLUTION INTRAMUSCULAR; INTRAVENOUS CODE/TRAUMA/SEDATION MEDICATION
Status: DISCONTINUED | OUTPATIENT
Start: 2024-07-22 | End: 2024-07-22 | Stop reason: HOSPADM

## 2024-07-22 RX ORDER — HEPARIN SODIUM 1000 [USP'U]/ML
INJECTION, SOLUTION INTRAVENOUS; SUBCUTANEOUS CODE/TRAUMA/SEDATION MEDICATION
Status: DISCONTINUED | OUTPATIENT
Start: 2024-07-22 | End: 2024-07-22 | Stop reason: HOSPADM

## 2024-07-22 RX ORDER — VERAPAMIL HYDROCHLORIDE 2.5 MG/ML
INJECTION, SOLUTION INTRAVENOUS CODE/TRAUMA/SEDATION MEDICATION
Status: DISCONTINUED | OUTPATIENT
Start: 2024-07-22 | End: 2024-07-22 | Stop reason: HOSPADM

## 2024-07-22 RX ORDER — SODIUM CHLORIDE 9 MG/ML
100 INJECTION, SOLUTION INTRAVENOUS CONTINUOUS
Status: DISCONTINUED | OUTPATIENT
Start: 2024-07-22 | End: 2024-07-22 | Stop reason: HOSPADM

## 2024-07-22 RX ORDER — LIDOCAINE HYDROCHLORIDE 10 MG/ML
INJECTION, SOLUTION EPIDURAL; INFILTRATION; INTRACAUDAL; PERINEURAL CODE/TRAUMA/SEDATION MEDICATION
Status: DISCONTINUED | OUTPATIENT
Start: 2024-07-22 | End: 2024-07-22 | Stop reason: HOSPADM

## 2024-07-22 RX ORDER — NITROGLYCERIN 20 MG/100ML
INJECTION INTRAVENOUS CODE/TRAUMA/SEDATION MEDICATION
Status: DISCONTINUED | OUTPATIENT
Start: 2024-07-22 | End: 2024-07-22 | Stop reason: HOSPADM

## 2024-07-22 RX ORDER — SODIUM CHLORIDE 9 MG/ML
125 INJECTION, SOLUTION INTRAVENOUS CONTINUOUS
Status: DISCONTINUED | OUTPATIENT
Start: 2024-07-22 | End: 2024-07-22 | Stop reason: HOSPADM

## 2024-07-22 RX ADMIN — ASPIRIN 81 MG CHEWABLE TABLET 324 MG: 81 TABLET CHEWABLE at 08:44

## 2024-07-22 RX ADMIN — SODIUM CHLORIDE 125 ML/HR: 0.9 INJECTION, SOLUTION INTRAVENOUS at 08:52

## 2024-07-22 NOTE — INTERVAL H&P NOTE
Prior note reviewed.  Patient seen and examined.    /90   Pulse 84   Temp 97.5 °F (36.4 °C) (Temporal)   Resp 16   Wt 106 kg (233 lb 7.5 oz)   SpO2 97%   BMI 29.98 kg/m²      After examining the patient, I find no significant changes to the note since it has been written.     Accept for today's history and physical.    Elizabeth Dawn, DO 07/22/24

## 2024-07-22 NOTE — DISCHARGE INSTR - AVS FIRST PAGE
1. Please see the post cardiac catheterization dishcarge instructions.   No heavy lifting, greater than 10 lbs. or strenuous  activity for 48 hrs.    2.Remove band aid tomorrow.  Shower and wash area- wrist gently with soap and water- beginning tomorrow. Rinse and pat dry.  Apply new water seal band aid.  Repeat this process for 5 days. No powders, creams lotions or antibiotic ointments  for 5 days.  No tub baths, hot tubs or swimming for 5 days.     3. Please call our office ([phone]) if you have any fever, redness, swelling, discharge from your wrist access site.    4.No driving for 1 day

## 2024-07-25 ENCOUNTER — OFFICE VISIT (OUTPATIENT)
Dept: CARDIOLOGY CLINIC | Facility: CLINIC | Age: 74
End: 2024-07-25
Payer: COMMERCIAL

## 2024-07-25 VITALS
SYSTOLIC BLOOD PRESSURE: 114 MMHG | HEART RATE: 79 BPM | WEIGHT: 235.6 LBS | OXYGEN SATURATION: 95 % | BODY MASS INDEX: 30.24 KG/M2 | DIASTOLIC BLOOD PRESSURE: 70 MMHG | HEIGHT: 74 IN

## 2024-07-25 DIAGNOSIS — I10 HYPERTENSION, UNSPECIFIED TYPE: ICD-10-CM

## 2024-07-25 DIAGNOSIS — I25.10 ATHEROSCLEROSIS OF NATIVE CORONARY ARTERY OF NATIVE HEART WITHOUT ANGINA PECTORIS: ICD-10-CM

## 2024-07-25 DIAGNOSIS — I48.91 ATRIAL FIBRILLATION, UNSPECIFIED TYPE (HCC): ICD-10-CM

## 2024-07-25 DIAGNOSIS — I44.7 LBBB (LEFT BUNDLE BRANCH BLOCK): ICD-10-CM

## 2024-07-25 DIAGNOSIS — I50.20 HFREF (HEART FAILURE WITH REDUCED EJECTION FRACTION) (HCC): Primary | ICD-10-CM

## 2024-07-25 PROCEDURE — 99205 OFFICE O/P NEW HI 60 MIN: CPT | Performed by: INTERNAL MEDICINE

## 2024-07-25 NOTE — PATIENT INSTRUCTIONS
Start Jardiance 10mg daily  Obtain BMP in 1 week  Continue rest of cardiac medications  2g sodium diet  Physical activities/walking as tolerated

## 2024-07-25 NOTE — PROGRESS NOTES
Advanced Heart Failure Outpatient Consult Note - Amol Montes De Oca 74 y.o. male MRN: [id number]    Encounter: [id number]      Assessment/Plan:    Patient Active Problem List    Diagnosis Date Noted    Atrial fibrillation, unspecified type ([facility]) 07/25/2024    Coronary artery disease involving native coronary artery of native heart with angina pectoris ([facility]) 07/22/2024    HFrEF (heart failure with reduced ejection fraction) ([facility]) 07/10/2024    Hypertension     Hyperlipidemia        # New onset heart failure with reduced EF, 30-35% per documentation, no imaging or report available for review  Etiology: Ischemic and nonischemic components.  Severe stenosis of proximal LAD on cardiac catheterization. Degree of reported cardiomyopathy out of proportion to CAD.  Paroxysmal atrial fibrillation on Zio.  Dyssynchrony with wide LBBB    Neurohormonal Blockade:  --Beta-Blocker: metoprolol succinate 25mg daily  --ACEi, ARB or ARNi: entresto 24-26mg BID  --Aldosterone Receptor Blocker: none  --SGLT2 Inhibitor: none  --Diuretic: none    Sudden Cardiac Death Risk Reduction:  --ICD: assess for improvement with medical therapy, management of afib +/- CRT    Electrical Resynchronization:  --Candidacy for BiV device: LBBB, QRSd 166-170msec    # LBBB, QRSd 166-170 msec    # PACs, PVCs  Low burden on 1 week holter 7/1/24  Metoprolol as above    # Paroxysmal atrial fibrillation   30% burden on 1 week holter 7/1/24  Continue with Toprol for rate control  Continue anticoagulation with Eliquis  2-week ZIO ongoing  Further management per EP    # Coronary artery disease, 80% stenosis of proximal LAD  On monotherapy with apixaban for afib, continue statin  No anginal symptoms    Today's Plan:  Euvolemic, warm on exam  Continue optimization of GDMT  Start Jardiance 10mg daily  Obtain BMP in 1 week  Continue entresto and metoprolol  Wife brought flash drive for echo but unable to view, sent for uploading from the office  Await results of 2 week Zio  to assess current afib burden. Will have EP follow up then for management of afib and possible CRT. To assess for viability and revascularization pending response to above.      HPI:   74-year-old male with past medical history of hypertension, hyperlipidemia, gout who is referred by Dr. Mcfarland for heart failure evaluation.  Patient was undergoing regular 6-month follow-up with PCP about 3 to 4 weeks ago when he was noted to have abnormal EKG, and possible A-fib.  Also had an echocardiogram which reportedly had a reduced EF at 30 to 35%.  ARB was switched to Entresto and metoprolol was added.  He was also started on anticoagulation with Eliquis.  He was seen by Dr. Mcfarland on July 10 for evaluation.  Office EKG noted to have normal sinus rhythm with premature atrial contractions and left bundle branch block.  Patient had a 1 week Zio prior to that office visit with concern for possible atrial fibrillation but report not available for review at that time.  Cardiac catheterization was ordered to rule out ischemic cardiomyopathy, referred to heart failure for further evaluation and management and 2-week Zio ordered.  1 week ZIO on  showed predominantly normal sinus rhythm with AF burden of 30%.  PVC burden 4.6%.  PAC burden 5.2%.  Average heart rate during normal sinus rhythm 70 bpm, 77 bpm during atrial fibrillation.  He had a cardiac catheterization on 2024 which showed 80% proximal LAD stenosis.  Interventional cardiology recommended CTS evaluation for CABG/RACHEL ligation and possible viability study.   He is currently completing the 2-week heart monitor.  Patient reports overall stable exertional capacity leading up to routine follow-up with PCP.  He denies leg swelling, PND orthopnea.  He is able to walk 2 flights of stairs and would need to catch his breath for short period.  Denies palpitations or lightheadedness.  He has intermittent of dizziness due to vertigo.  No syncope.  FH: father  young  at 62, had open heart surgery and also had cancer at that time   SH: No smoking or drug use, alcohol intake of 1 drink a day, recently discontinued      Past Medical History:   Diagnosis Date    COVID-19     1/2023 recoverd @ home    GERD (gastroesophageal reflux disease)     Gout     Hyperlipidemia     Hypertension     Serum cholesterol elevated        Review of Systems   Constitutional:  Negative for chills, fatigue and fever.   HENT:  Negative for ear pain and sore throat.    Eyes:  Negative for pain and visual disturbance.   Respiratory:  Negative for cough, chest tightness and shortness of breath.    Cardiovascular:  Negative for chest pain, palpitations and leg swelling.   Gastrointestinal:  Negative for abdominal distention, abdominal pain and vomiting.   Genitourinary:  Negative for dysuria and hematuria.   Musculoskeletal:  Negative for arthralgias and back pain.   Skin:  Negative for color change and rash.   Neurological:  Negative for seizures, syncope and light-headedness.   All other systems reviewed and are negative.       No Known Allergies  .    Current Outpatient Medications:     acetaminophen (TYLENOL) 325 mg tablet, Take 650 mg by mouth every 6 (six) hours as needed for mild pain, Disp: , Rfl:     atorvastatin (LIPITOR) 20 mg tablet, Take 20 mg by mouth daily at bedtime, Disp: , Rfl:     CALCIUM-VITAMIN D PO, Take 1 capsule by mouth in the morning, Disp: , Rfl:     Eliquis 5 MG, Take 5 mg by mouth 2 (two) times a day, Disp: , Rfl:     Empagliflozin (JARDIANCE) 10 MG TABS tablet, Take 1 tablet (10 mg total) by mouth every morning, Disp: 30 tablet, Rfl: 0    indomethacin (INDOCIN) 50 mg capsule, Take 50 mg by mouth if needed (takes for gout pain as needed), Disp: , Rfl:     loratadine (CLARITIN) 10 mg tablet, Take 10 mg by mouth daily, Disp: , Rfl:     metoprolol succinate (TOPROL-XL) 25 mg 24 hr tablet, Take 25 mg by mouth daily, Disp: , Rfl:     Multiple Vitamins-Minerals (multivitamin with  "minerals) tablet, Take 1 tablet by mouth daily, Disp: , Rfl:     sacubitril-valsartan (Entresto) 24-26 MG TABS, Take 1 tablet by mouth 2 (two) times a day, Disp: , Rfl:     testosterone (ANDROGEL) 25 MG/2.5GM (1%) GEL, Place 25 mg on the skin daily, Disp: , Rfl:     VITAMIN D PO, Take 1 capsule by mouth in the morning, Disp: , Rfl:     Social History     Socioeconomic History    Marital status: /Civil Union     Spouse name: Not on file    Number of children: Not on file    Years of education: Not on file    Highest education level: Not on file   Occupational History    Not on file   Tobacco Use    Smoking status: Never    Smokeless tobacco: Never    Tobacco comments:     No smoking in the home   Vaping Use    Vaping status: Never Used   Substance and Sexual Activity    Alcohol use: Yes     Comment: 1 drink a day. varies beer wine or liqour 6/12    Drug use: Never    Sexual activity: Not on file   Other Topics Concern    Not on file   Social History Narrative    Not on file     Social Determinants of Health     Financial Resource Strain: Not on file   Food Insecurity: Not on file   Transportation Needs: Not on file   Physical Activity: Not on file   Stress: Not on file   Social Connections: Not on file   Intimate Partner Violence: Not on file   Housing Stability: Not on file       No family history on file.    Physical Exam:    Vitals: Blood pressure 114/70, pulse 79, height 6' 2\" (1.88 m), weight 107 kg (235 lb 9.6 oz), SpO2 95%., Body mass index is 30.25 kg/m².,   Wt Readings from Last 3 Encounters:   07/25/24 107 kg (235 lb 9.6 oz)   07/22/24 106 kg (233 lb 7.5 oz)   07/18/24 107 kg (235 lb 0.2 oz)       Physical Exam  Constitutional:       General: He is not in acute distress.     Appearance: Normal appearance.   HENT:      Head: Normocephalic and atraumatic.      Mouth/Throat:      Mouth: Mucous membranes are moist.   Eyes:      General: No scleral icterus.     Extraocular Movements: Extraocular movements " "intact.   Neck:      Vascular: No JVD.   Cardiovascular:      Rate and Rhythm: Normal rate and regular rhythm.      Pulses: Normal pulses.      Heart sounds: S1 normal and S2 normal. No murmur heard.     No friction rub. No gallop.   Pulmonary:      Breath sounds: Normal breath sounds.   Abdominal:      General: There is no distension.      Palpations: Abdomen is soft.      Tenderness: There is no abdominal tenderness. There is no guarding or rebound.   Musculoskeletal:         General: Normal range of motion.      Cervical back: Neck supple.      Right lower leg: No edema.      Left lower leg: No edema.   Skin:     General: Skin is warm and dry.      Capillary Refill: Capillary refill takes less than 2 seconds.   Neurological:      General: No focal deficit present.      Mental Status: He is alert and oriented to person, place, and time.   Psychiatric:         Mood and Affect: Mood normal.         Labs & Results:    Lab Results   Component Value Date    WBC 7.55 07/18/2024    HGB 16.9 07/18/2024    HCT 51.5 (H) 07/18/2024    MCV 88 07/18/2024     07/18/2024     Lab Results   Component Value Date    SODIUM 143 07/18/2024    K 3.4 (L) 07/18/2024     07/18/2024    CO2 31 07/18/2024    BUN 18 07/18/2024    CREATININE 1.10 07/18/2024    GLUC 102 07/18/2024    CALCIUM 9.6 07/18/2024     No results found for: \"NTBNP\"   Lab Results   Component Value Date    CHOLESTEROL 153 06/03/2024    CHOLESTEROL 197 11/27/2023    CHOLESTEROL 162 12/14/2022     Lab Results   Component Value Date    HDL 51 06/03/2024    HDL 56 11/27/2023    HDL 55 12/14/2022     Lab Results   Component Value Date    TRIG 72 06/03/2024    TRIG 114 11/27/2023    TRIG 86 12/14/2022     Lab Results   Component Value Date    NONHDLC 102 06/03/2024    NONHDLC 141 11/27/2023    NONHDLC 107 12/14/2022       EKG personally reviewed by Ursula Palma MD.     Counseling / Coordination of Care  I have spent a total time of 70 minutes in caring for this " patient on the day of the visit/encounter including Diagnostic results, Instructions for management, Patient and family education, Impressions, Counseling / Coordination of care, Documenting in the medical record, Reviewing / ordering tests, medicine, procedures  , Obtaining or reviewing history  , and Communicating with other healthcare professionals .      Thank you for the opportunity to participate in the care of this patient.    TEQUILA HURST MD  ADVANCED HEART FAILURE AND MECHANICAL CIRCULATORY SUPPORT  [facility]

## 2024-07-30 ENCOUNTER — TELEPHONE (OUTPATIENT)
Dept: CARDIOLOGY CLINIC | Facility: CLINIC | Age: 74
End: 2024-07-30

## 2024-07-30 DIAGNOSIS — I25.10 CORONARY ARTERY DISEASE INVOLVING NATIVE CORONARY ARTERY OF NATIVE HEART WITHOUT ANGINA PECTORIS: Primary | ICD-10-CM

## 2024-07-30 DIAGNOSIS — I50.22 CHRONIC SYSTOLIC HEART FAILURE (HCC): ICD-10-CM

## 2024-07-30 NOTE — PROGRESS NOTES
Patient had Proximal LAD stenosis 80%. No PCI done, referred to CT Surgery but patients cardiomyopathy is out of preportion to CAD likely unrelated.  Has LBBB   Awaiting Zio to see if has afib.    At this point recommend Cardiac MRI to see if scar, prior MI, and viability.    Likely will need BIV ICD if EF remains at 35% after 3 months of medical management.  Echo pending.  Suspect EF would improve with BIV ICD      May need PCI later time, doubtful would benefit from CABG for single vessel disease.    D/w patients wife.

## 2024-07-30 NOTE — TELEPHONE ENCOUNTER
----- Message from Artur Mcfarland MD sent at 7/30/2024  9:42 AM EDT -----  Can you order patient Cardiac MRI with contrast and call him to help set up?  Also needs f/u with me after his echo in oct.

## 2024-07-30 NOTE — TELEPHONE ENCOUNTER
CIRO --    Spoke to pt. He will have spouse call us back to schedule appts. He said she has the calendar w/ all his appt info.

## 2024-07-31 ENCOUNTER — TELEPHONE (OUTPATIENT)
Dept: CARDIOLOGY CLINIC | Facility: CLINIC | Age: 74
End: 2024-07-31

## 2024-07-31 NOTE — TELEPHONE ENCOUNTER
Patient called with several questions about upcoming testing and concern for waiting 3 months for next echo.  I had a lengthy discussion with patient and explained the rationale behind the testing (cardiac MRI and echo) and the length of time for when they are being done.  We discussed, per Dr. Palma's note, the 2g sodium restriction as well as walking as tolerated and medication compliance.  He is currently asymptomatic.      He did have a concern about the blockage in his LAD and if this requires intervention.  I told him that I would present this question to Dr. Palma for further information as I could not tell specifically what that plan was moving forward.      He was satisfied with our conversation and I answered most of his questions.

## 2024-08-02 ENCOUNTER — APPOINTMENT (OUTPATIENT)
Dept: LAB | Facility: CLINIC | Age: 74
End: 2024-08-02
Payer: COMMERCIAL

## 2024-08-02 DIAGNOSIS — I50.20 HFREF (HEART FAILURE WITH REDUCED EJECTION FRACTION) (HCC): ICD-10-CM

## 2024-08-02 LAB
ANION GAP SERPL CALCULATED.3IONS-SCNC: 8 MMOL/L (ref 4–13)
BUN SERPL-MCNC: 18 MG/DL (ref 5–25)
CALCIUM SERPL-MCNC: 9.1 MG/DL (ref 8.4–10.2)
CHLORIDE SERPL-SCNC: 108 MMOL/L (ref 96–108)
CO2 SERPL-SCNC: 26 MMOL/L (ref 21–32)
CREAT SERPL-MCNC: 0.97 MG/DL (ref 0.6–1.3)
GFR SERPL CREATININE-BSD FRML MDRD: 75 ML/MIN/1.73SQ M
GLUCOSE P FAST SERPL-MCNC: 97 MG/DL (ref 65–99)
POTASSIUM SERPL-SCNC: 3.7 MMOL/L (ref 3.5–5.3)
SODIUM SERPL-SCNC: 142 MMOL/L (ref 135–147)

## 2024-08-02 PROCEDURE — 36415 COLL VENOUS BLD VENIPUNCTURE: CPT

## 2024-08-02 PROCEDURE — 80048 BASIC METABOLIC PNL TOTAL CA: CPT

## 2024-08-06 ENCOUNTER — CLINICAL SUPPORT (OUTPATIENT)
Dept: CARDIOLOGY CLINIC | Facility: CLINIC | Age: 74
End: 2024-08-06
Payer: COMMERCIAL

## 2024-08-06 DIAGNOSIS — I50.20 HFREF (HEART FAILURE WITH REDUCED EJECTION FRACTION) (HCC): ICD-10-CM

## 2024-08-06 DIAGNOSIS — I10 HYPERTENSION, UNSPECIFIED TYPE: ICD-10-CM

## 2024-08-06 PROCEDURE — 93244 EXT ECG>48HR<7D REV&INTERPJ: CPT

## 2024-08-14 ENCOUNTER — TELEPHONE (OUTPATIENT)
Dept: CARDIOLOGY CLINIC | Facility: CLINIC | Age: 74
End: 2024-08-14

## 2024-08-21 ENCOUNTER — APPOINTMENT (EMERGENCY)
Dept: CT IMAGING | Facility: HOSPITAL | Age: 74
End: 2024-08-21
Payer: COMMERCIAL

## 2024-08-21 ENCOUNTER — HOSPITAL ENCOUNTER (OUTPATIENT)
Facility: HOSPITAL | Age: 74
Setting detail: OBSERVATION
Discharge: HOME | End: 2024-08-22
Attending: EMERGENCY MEDICINE | Admitting: INTERNAL MEDICINE
Payer: COMMERCIAL

## 2024-08-21 DIAGNOSIS — R29.90 STROKE-LIKE SYMPTOM: ICD-10-CM

## 2024-08-21 DIAGNOSIS — R59.0 LYMPHADENOPATHY, THORACIC: ICD-10-CM

## 2024-08-21 DIAGNOSIS — R42 DIZZINESS: Primary | ICD-10-CM

## 2024-08-21 LAB
2HR DELTA HS TROPONIN: 0 NG/L
4HR DELTA HS TROPONIN: -1 NG/L
ANION GAP SERPL CALCULATED.3IONS-SCNC: 5 MMOL/L (ref 4–13)
APTT PPP: 26 SECONDS (ref 23–34)
BUN SERPL-MCNC: 16 MG/DL (ref 5–25)
CALCIUM SERPL-MCNC: 9.5 MG/DL (ref 8.4–10.2)
CARDIAC TROPONIN I PNL SERPL HS: 10 NG/L
CARDIAC TROPONIN I PNL SERPL HS: 11 NG/L
CARDIAC TROPONIN I PNL SERPL HS: 11 NG/L
CHLORIDE SERPL-SCNC: 108 MMOL/L (ref 96–108)
CO2 SERPL-SCNC: 30 MMOL/L (ref 21–32)
CREAT SERPL-MCNC: 1.09 MG/DL (ref 0.6–1.3)
ERYTHROCYTE [DISTWIDTH] IN BLOOD BY AUTOMATED COUNT: 14.2 % (ref 11.6–15.1)
FLUAV RNA RESP QL NAA+PROBE: NEGATIVE
FLUBV RNA RESP QL NAA+PROBE: NEGATIVE
GFR SERPL CREATININE-BSD FRML MDRD: 65 ML/MIN/1.73SQ M
GLUCOSE SERPL-MCNC: 105 MG/DL (ref 65–140)
GLUCOSE SERPL-MCNC: 98 MG/DL (ref 65–140)
HCT VFR BLD AUTO: 54.2 % (ref 36.5–49.3)
HGB BLD-MCNC: 17.7 G/DL (ref 12–17)
INR PPP: 1.12 (ref 0.85–1.19)
MCH RBC QN AUTO: 28.5 PG (ref 26.8–34.3)
MCHC RBC AUTO-ENTMCNC: 32.7 G/DL (ref 31.4–37.4)
MCV RBC AUTO: 87 FL (ref 82–98)
PLATELET # BLD AUTO: 176 THOUSANDS/UL (ref 149–390)
PMV BLD AUTO: 10.5 FL (ref 8.9–12.7)
POTASSIUM SERPL-SCNC: 3.8 MMOL/L (ref 3.5–5.3)
PROTHROMBIN TIME: 14.6 SECONDS (ref 12.3–15)
RBC # BLD AUTO: 6.2 MILLION/UL (ref 3.88–5.62)
RSV RNA RESP QL NAA+PROBE: NEGATIVE
SARS-COV-2 RNA RESP QL NAA+PROBE: NEGATIVE
SODIUM SERPL-SCNC: 143 MMOL/L (ref 135–147)
WBC # BLD AUTO: 7.43 THOUSAND/UL (ref 4.31–10.16)

## 2024-08-21 PROCEDURE — 36415 COLL VENOUS BLD VENIPUNCTURE: CPT

## 2024-08-21 PROCEDURE — 99285 EMERGENCY DEPT VISIT HI MDM: CPT

## 2024-08-21 PROCEDURE — 0241U HB NFCT DS VIR RESP RNA 4 TRGT: CPT

## 2024-08-21 PROCEDURE — 93005 ELECTROCARDIOGRAM TRACING: CPT

## 2024-08-21 PROCEDURE — 85610 PROTHROMBIN TIME: CPT

## 2024-08-21 PROCEDURE — 85730 THROMBOPLASTIN TIME PARTIAL: CPT

## 2024-08-21 PROCEDURE — 85027 COMPLETE CBC AUTOMATED: CPT

## 2024-08-21 PROCEDURE — 82948 REAGENT STRIP/BLOOD GLUCOSE: CPT

## 2024-08-21 PROCEDURE — 70496 CT ANGIOGRAPHY HEAD: CPT

## 2024-08-21 PROCEDURE — 80048 BASIC METABOLIC PNL TOTAL CA: CPT

## 2024-08-21 PROCEDURE — 84484 ASSAY OF TROPONIN QUANT: CPT

## 2024-08-21 PROCEDURE — 74177 CT ABD & PELVIS W/CONTRAST: CPT

## 2024-08-21 PROCEDURE — 70498 CT ANGIOGRAPHY NECK: CPT

## 2024-08-21 PROCEDURE — 96360 HYDRATION IV INFUSION INIT: CPT

## 2024-08-21 PROCEDURE — 99222 1ST HOSP IP/OBS MODERATE 55: CPT | Performed by: INTERNAL MEDICINE

## 2024-08-21 RX ORDER — ATORVASTATIN CALCIUM 20 MG/1
20 TABLET, FILM COATED ORAL
Status: DISCONTINUED | OUTPATIENT
Start: 2024-08-21 | End: 2024-08-22

## 2024-08-21 RX ORDER — METOPROLOL SUCCINATE 25 MG/1
25 TABLET, EXTENDED RELEASE ORAL DAILY
Status: DISCONTINUED | OUTPATIENT
Start: 2024-08-22 | End: 2024-08-22 | Stop reason: HOSPADM

## 2024-08-21 RX ORDER — MECLIZINE HYDROCHLORIDE 25 MG/1
25 TABLET ORAL ONCE
Status: COMPLETED | OUTPATIENT
Start: 2024-08-21 | End: 2024-08-21

## 2024-08-21 RX ADMIN — IOHEXOL 85 ML: 350 INJECTION, SOLUTION INTRAVENOUS at 19:55

## 2024-08-21 RX ADMIN — SODIUM CHLORIDE 250 ML: 0.9 INJECTION, SOLUTION INTRAVENOUS at 20:06

## 2024-08-21 RX ADMIN — MECLIZINE HYDROCHLORIDE 25 MG: 25 TABLET ORAL at 20:08

## 2024-08-21 RX ADMIN — IOHEXOL 100 ML: 350 INJECTION, SOLUTION INTRAVENOUS at 19:58

## 2024-08-21 NOTE — QUICK NOTE
Neurology Stroke Alert Documentation    Stroke alert called at 1940, neurology response was immediate. Discussed case with ED over phone. History and examination per ED.    Amol Montes De Oca is a 74 year old man who presented to the hospital for evaluation of dizziness. He described dizziness as a room spinning sensation. Not significantly changed with movement or with rest, persistent since onset. No history of similar symptoms noted. Otherwise reports some nausea in addition to lower back pain but felt this was related to mowing his lawn earlier. No other symptoms noted. On ED evaluation, NIHSS 0 although felt to potentially have an ataxic gait on their evaluation. Takes Eliquis at home. No other noted issues.   - NIHSS 0  - LKW 1830    Discussed with Radiology:  CTH: no acute intracranial abnormality, including no obvious stroke or hemorrhage  CTA H/N: no LVO or significant stenosis.    Not a TNK candidate given bleeding risk with concurrent Eliquis use.    Not an endovascular candidate since no LVO.     Pt with dizziness, suspect more likely peripheral potentially triggered with recent activity outside but given risk factors will look to rule out central cause. Reported feeling better after meclizine. MRI brain. Systolic BP goal less than 180. Continue home Eliquis.

## 2024-08-21 NOTE — ED PROVIDER NOTES
History  Chief Complaint   Patient presents with    Dizziness     Pt states dizziness that started tonight all of a sudden, pt states he feels like the room is spinning and unsure if it is vertigo or his medications. Pt c/o of nausea as well.      The patient is a 74-year-old male with history of CHF, hypertension, hyperlipidemia, atrial fibrillation on Eliquis who presents to the ED for evaluation of dizziness described as the room spinning which began suddenly an hour prior to arrival.  He reports symptoms do not exacerbate with head movement, and remain constant even if he keeps his head still.  He also note nausea, and mild lower back discomfort, but does note that he was mowing his lawn with a push mower tonight.  He notes that when the dizziness began, he did have diaphoresis as well.  He reports symptoms have somewhat improved since beginning, but are still present.  He has an unsteady gait due to his dizziness.  He otherwise denies chest pain, dyspnea, abdominal pain, vomiting, vision changes, numbness, paresthesia, focal weakness, diarrhea, melena, hematochezia.        Prior to Admission Medications   Prescriptions Last Dose Informant Patient Reported? Taking?   CALCIUM-VITAMIN D PO 8/20/2024 Self Yes Yes   Sig: Take 1 capsule by mouth in the morning   Eliquis 5 MG 8/20/2024 Self Yes Yes   Sig: Take 5 mg by mouth 2 (two) times a day   Empagliflozin (JARDIANCE) 10 MG TABS tablet 8/21/2024  No Yes   Sig: Take 1 tablet (10 mg total) by mouth every morning   Multiple Vitamins-Minerals (multivitamin with minerals) tablet 8/21/2024 Self Yes Yes   Sig: Take 1 tablet by mouth daily   VITAMIN D PO 8/21/2024 Self Yes Yes   Sig: Take 1 capsule by mouth in the morning   acetaminophen (TYLENOL) 325 mg tablet More than a month Self Yes No   Sig: Take 650 mg by mouth every 6 (six) hours as needed for mild pain   atorvastatin (LIPITOR) 20 mg tablet 8/21/2024 Self Yes Yes   Sig: Take 20 mg by mouth daily at bedtime    indomethacin (INDOCIN) 50 mg capsule Past Week Self Yes Yes   Sig: Take 50 mg by mouth if needed (takes for gout pain as needed)   loratadine (CLARITIN) 10 mg tablet 8/20/2024 Self Yes Yes   Sig: Take 10 mg by mouth daily   metoprolol succinate (TOPROL-XL) 25 mg 24 hr tablet 8/21/2024 Self Yes Yes   Sig: Take 25 mg by mouth daily   sacubitril-valsartan (Entresto) 24-26 MG TABS 8/21/2024 Self Yes Yes   Sig: Take 1 tablet by mouth 2 (two) times a day   testosterone (ANDROGEL) 25 MG/2.5GM (1%) GEL 8/21/2024 Self Yes Yes   Sig: Place 25 mg on the skin daily      Facility-Administered Medications: None       Past Medical History:   Diagnosis Date    COVID-19     1/2023 recoverd @ home    GERD (gastroesophageal reflux disease)     Gout     Hyperlipidemia     Hypertension     Serum cholesterol elevated        Past Surgical History:   Procedure Laterality Date    CARDIAC CATHETERIZATION Left 7/22/2024    Procedure: Cardiac catheterization;  Surgeon: Elizabeth Dawn DO;  Location: [facility];  Service: Cardiology    CARDIAC CATHETERIZATION N/A 7/22/2024    Procedure: Cardiac Coronary Angiogram;  Surgeon: Elizabeth Dawn DO;  Location: [facility];  Service: Cardiology    COLONOSCOPY      LIPOMA RESECTION Left     Left lower abdomen/groin area    SUBMANDIBULAR GLAND EXCISION Left 6/14/2023    Procedure: SUBMANDIBULAR GLAND EXICISON;  Surgeon: Andrew Hummel MD;  Location: [facility];  Service: ENT       History reviewed. No pertinent family history.  I have reviewed and agree with the history as documented.    E-Cigarette/Vaping    E-Cigarette Use Never User      E-Cigarette/Vaping Substances    Nicotine No     THC No     CBD No      Social History     Tobacco Use    Smoking status: Never    Smokeless tobacco: Never    Tobacco comments:     No smoking in the home   Vaping Use    Vaping status: Never Used   Substance Use Topics    Alcohol use: Not Currently     Comment: 1 drink a day. varies  beer wine or liqour 6/12    Drug use: Never       Review of Systems   Constitutional:  Negative for chills and fever.   HENT:  Negative for congestion and rhinorrhea.    Eyes:  Negative for visual disturbance.   Respiratory:  Negative for cough and shortness of breath.    Cardiovascular:  Negative for chest pain and leg swelling.   Gastrointestinal:  Positive for nausea. Negative for abdominal pain, constipation, diarrhea and vomiting.   Genitourinary:  Negative for dysuria and flank pain.   Musculoskeletal:  Positive for back pain. Negative for arthralgias and myalgias.   Skin:  Negative for rash and wound.   Neurological:  Positive for dizziness. Negative for weakness, numbness and headaches.       Physical Exam  Physical Exam  Vitals and nursing note reviewed.   Constitutional:       General: He is not in acute distress.     Appearance: He is well-developed. He is not toxic-appearing.   HENT:      Head: Normocephalic and atraumatic.      Mouth/Throat:      Mouth: Mucous membranes are moist.   Eyes:      Conjunctiva/sclera: Conjunctivae normal.   Cardiovascular:      Rate and Rhythm: Normal rate and regular rhythm.      Heart sounds: No murmur heard.  Pulmonary:      Effort: Pulmonary effort is normal. No respiratory distress.      Breath sounds: Normal breath sounds.   Abdominal:      Palpations: Abdomen is soft.      Tenderness: There is no abdominal tenderness.   Musculoskeletal:         General: No swelling.      Cervical back: Neck supple.   Skin:     General: Skin is warm and dry.      Capillary Refill: Capillary refill takes less than 2 seconds.   Neurological:      Mental Status: He is alert and oriented to person, place, and time.      GCS: GCS eye subscore is 4. GCS verbal subscore is 5. GCS motor subscore is 6.      Cranial Nerves: Cranial nerves 2-12 are intact. No facial asymmetry.      Sensory: Sensation is intact.      Motor: Motor function is intact. No pronator drift.      Coordination:  Finger-Nose-Finger Test and Heel to Cruz Test normal.      Gait: Gait abnormal (ataxic).   Psychiatric:         Mood and Affect: Mood normal.         Vital Signs  ED Triage Vitals   Temperature Pulse Respirations Blood Pressure SpO2   08/21/24 1921 08/21/24 1921 08/21/24 1921 08/21/24 1921 08/21/24 1921   98.1 °F (36.7 °C) 66 18 134/90 96 %      Temp Source Heart Rate Source Patient Position - Orthostatic VS BP Location FiO2 (%)   08/21/24 1921 08/21/24 1921 08/21/24 1921 08/21/24 1921 --   Oral Monitor Lying Right arm       Pain Score       08/21/24 2310       No Pain           Vitals:    08/21/24 2100 08/21/24 2115 08/21/24 2130 08/21/24 2145   BP: 134/63 118/74 132/60 131/82   Pulse: 66 66 68 70   Patient Position - Orthostatic VS: Lying Lying Lying Lying         Visual Acuity  Visual Acuity      Flowsheet Row Most Recent Value   L Pupil Size (mm) 3   R Pupil Size (mm) 4            ED Medications  Medications   atorvastatin (LIPITOR) tablet 20 mg (has no administration in time range)   apixaban (ELIQUIS) tablet 5 mg (has no administration in time range)   metoprolol succinate (TOPROL-XL) 24 hr tablet 25 mg (has no administration in time range)   sacubitril-valsartan (ENTRESTO) 24-26 MG per tablet 1 tablet (has no administration in time range)   meclizine (ANTIVERT) tablet 25 mg (25 mg Oral Given 8/21/24 2008)   iohexol (OMNIPAQUE) 350 MG/ML injection (MULTI-DOSE) 85 mL (85 mL Intravenous Given 8/21/24 1955)   sodium chloride 0.9 % bolus 250 mL (0 mL Intravenous Stopped 8/21/24 2106)   iohexol (OMNIPAQUE) 350 MG/ML injection (MULTI-DOSE) 100 mL (100 mL Intravenous Given 8/21/24 1958)       Diagnostic Studies  Results Reviewed       Procedure Component Value Units Date/Time    HS Troponin I 4hr [[id number]] Collected: 08/21/24 2325    Lab Status: In process Specimen: Blood from Hand, Left Updated: 08/21/24 2328    HS Troponin I 2hr [[id number]]  (Normal) Collected: 08/21/24 2573    Lab Status: Final result Specimen:  Blood from Arm, Right Updated: 08/21/24 2222     hs TnI 2hr 11 ng/L      Delta 2hr hsTnI 0 ng/L     FLU/RSV/COVID - if FLU/RSV clinically relevant [[id number]]  (Normal) Collected: 08/21/24 1946    Lab Status: Final result Specimen: Nares from Nose Updated: 08/21/24 2029     SARS-CoV-2 Negative     INFLUENZA A PCR Negative     INFLUENZA B PCR Negative     RSV PCR Negative    Narrative:      This test has been performed using the CoV-2/Flu/RSV plus assay on the [employer] in platform. This test has been validated by the  and verified by the performing laboratory.     This test is designed to amplify and detect the following: nucleocapsid (N), envelope (E), and RNA-dependent RNA polymerase (RdRP) genes of the SARS-CoV-2 genome; matrix (M), basic polymerase (PB2), and acidic protein (PA) segments of the influenza A genome; matrix (M) and non-structural protein (NS) segments of the influenza B genome, and the nucleocapsid genes of RSV A and RSV B.     Positive results are indicative of the presence of Flu A, Flu B, RSV, and/or SARS-CoV-2 RNA. Positive results for SARS-CoV-2 or suspected novel influenza should be reported to state, local, or federal health departments according to local reporting requirements.      All results should be assessed in conjunction with clinical presentation and other laboratory markers for clinical management.     FOR PEDIATRIC PATIENTS - copy/paste COVID Guidelines URL to browser: [email or web address]       HS Troponin 0hr (reflex protocol) [[id number]]  (Normal) Collected: 08/21/24 1945    Lab Status: Final result Specimen: Blood from Arm, Left Updated: 08/21/24 2014     hs TnI 0hr 11 ng/L     Protime-INR [[id number]]  (Normal) Collected: 08/21/24 1945    Lab Status: Final result Specimen: Blood from Arm, Left Updated: 08/21/24 2012     Protime 14.6 seconds      INR 1.12    Narrative:      INR Therapeutic  Range    Indication                                             INR Range      Atrial Fibrillation                                               2.0-3.0  Hypercoagulable State                                    2.0.2.3  Left Ventricular Asist Device                            2.0-3.0  Mechanical Heart Valve                                  -    Aortic(with afib, MI, embolism, HF, LA enlargement,    and/or coagulopathy)                                     2.0-3.0 (2.5-3.5)     Mitral                                                             2.5-3.5  Prosthetic/Bioprosthetic Heart Valve               2.0-3.0  Venous thromboembolism (VTE: VT, PE        2.0-3.0    APTT [[id number]]  (Normal) Collected: 08/21/24 1945    Lab Status: Final result Specimen: Blood from Arm, Left Updated: 08/21/24 2012     PTT 26 seconds     Basic metabolic panel [[id number]] Collected: 08/21/24 1945    Lab Status: Final result Specimen: Blood from Arm, Left Updated: 08/21/24 2010     Sodium 143 mmol/L      Potassium 3.8 mmol/L      Chloride 108 mmol/L      CO2 30 mmol/L      ANION GAP 5 mmol/L      BUN 16 mg/dL      Creatinine 1.09 mg/dL      Glucose 98 mg/dL      Calcium 9.5 mg/dL      eGFR 65 ml/min/1.73sq m     Narrative:      National Kidney Disease Foundation guidelines for Chronic Kidney Disease (CKD):     Stage 1 with normal or high GFR (GFR > 90 mL/min/1.73 square meters)    Stage 2 Mild CKD (GFR = 60-89 mL/min/1.73 square meters)    Stage 3A Moderate CKD (GFR = 45-59 mL/min/1.73 square meters)    Stage 3B Moderate CKD (GFR = 30-44 mL/min/1.73 square meters)    Stage 4 Severe CKD (GFR = 15-29 mL/min/1.73 square meters)    Stage 5 End Stage CKD (GFR <15 mL/min/1.73 square meters)  Note: GFR calculation is accurate only with a steady state creatinine    CBC and Platelet [[id number]]  (Abnormal) Collected: 08/21/24 1945    Lab Status: Final result Specimen: Blood from Arm, Left Updated: 08/21/24 1952     WBC 7.43 Thousand/uL      RBC  6.20 Million/uL      Hemoglobin 17.7 g/dL      Hematocrit 54.2 %      MCV 87 fL      MCH 28.5 pg      MCHC 32.7 g/dL      RDW 14.2 %      Platelets 176 Thousands/uL      MPV 10.5 fL     Fingerstick Glucose (POCT) [[id number]]  (Normal) Collected: 08/21/24 1941    Lab Status: Final result Specimen: Blood Updated: 08/21/24 1942     POC Glucose 105 mg/dl                    CT abdomen pelvis with contrast   Final Result by Iker Farah MD (08/21 2106)      No identifiable acute abnormality to account for the patient's clinical presentation.         Workstation performed: [id number]         CTA stroke alert (head/neck)   Final Result by Cameron Sanford MD (08/21 2021)         1. No stenosis, dissection or occlusion of the carotid or vertebral arteries or major vessels of the [place].   2. Contrast blush in the left ventromedial thalamus could represent a venous anomaly. No evidence of underlying lesion..               Findings were directly discussed with Jacinto Cortes at  8:19 PM  .      Workstation performed: [id number]         CT stroke alert brain   Final Result by Cameron Sanford MD (08/21 2022)      No acute intracranial abnormality.      Findings were directly discussed with Jacinto Cortes at   8:19 PM  .      Workstation performed: [id number]         MRI inpatient order    (Results Pending)              Procedures  Procedures         ED Course  ED Course as of 08/21/24 2334   Wed Aug 21, 2024   1944 Spoke with neurology    1950 ECG 12 lead  EKG atrial fibrillation at 64 bpm, left bundle branch block, QTc 433, no excessive discordance or inappropriate concordance, no STEMI as interpreted by me   1955 Hematocrit(!): 54.2  Suspect component of dehydration; pt with hx CHF, will hydrate with 250 cc and monitor closely   1955 Hemoglobin(!): 17.7   1955 RBC(!): 6.20   2019 Imaging negative for acute CVA   2150 Message sent to [facility]            Stroke Assessment       Row Name 08/21/24 1940              NIH Stroke Scale    Interval Baseline      Level of Consciousness (1a.) 0      LOC Questions (1b.) 0      LOC Commands (1c.) 0      Best Gaze (2.) 0      Visual (3.) 0      Facial Palsy (4.) 0      Motor Arm, Left (5a.) 0      Motor Arm, Right (5b.) 0      Motor Leg, Left (6a.) 0      Motor Leg, Right (6b.) 0      Limb Ataxia (7.) 0      Sensory (8.) 0      Best Language (9.) 0      Dysarthria (10.) 0      Extinction and Inattention (11.) (Formerly Neglect) 0      Total 0                      SBIRT 20yo+      Flowsheet Row Most Recent Value   Initial Alcohol Screen: US AUDIT-C     1. How often do you have a drink containing alcohol? 0 Filed at: 08/21/2024 2038   2. How many drinks containing alcohol do you have on a typical day you are drinking?  0 Filed at: 08/21/2024 2038   3a. Male UNDER 65: How often do you have five or more drinks on one occasion? 0 Filed at: 08/21/2024 2038   3b. FEMALE Any Age, or MALE 65+: How often do you have 4 or more drinks on one occassion? 0 Filed at: 08/21/2024 2038   Audit-C Score 0 Filed at: 08/21/2024 2038   THEA: How many times in the past year have you...    Used an illegal drug or used a prescription medication for non-medical reasons? Never Filed at: 08/21/2024 2038            Medical Decision Making  DDx including but not limited to: metabolic abnormality, cardiac abnormality, thyroid disease, intracranial process, adverse reaction; doubt infectious process.     Stroke alert called at 1939. Case discussed with Dr. Cortes, Neurology at 1944.    Will obtain EKG, troponin to evaluate for ACS.  Will obtain CTH, CTA to evaluate for CVA as well as CT abdomen and pelvis given lower back pain to r/o etiologies such as ruptured AAA.  Will obtain CBC to evaluate for leukocytosis, anemia.  Will obtain CMP to evaluate kidney function, for electrolyte disturbance.  Will obtain coags.  Will obtain COVID/flu/RSV testing. Will give antivert.    Labs consistent with dehydration.  Given  patient's decreased EF, will hydrate gently.  Labs otherwise without actionable derangement.  EKG without ischemic changes.  Imaging without acute findings.  On re-examination, patient reports some improvement in dizziness but it has not fully resolved. Neurology recommends admission.  Will admit    At the time of admission, the patient is in no acute distress. I discussed with the patient and family the diagnosis, treatment plan, and plan for admission; they were given the opportunity to ask questions and verbalized understanding. They agree with plan.        Problems Addressed:  Dizziness: acute illness or injury  Stroke-like symptom: acute illness or injury    Amount and/or Complexity of Data Reviewed  External Data Reviewed: labs, radiology, ECG and notes.  Labs: ordered. Decision-making details documented in ED Course.  Radiology: ordered. Decision-making details documented in ED Course.  ECG/medicine tests: ordered and independent interpretation performed. Decision-making details documented in ED Course.    Risk  Prescription drug management.  Decision regarding hospitalization.                 Disposition  Final diagnoses:   Dizziness   Stroke-like symptom   Lymphadenopathy, thoracic     Time reflects when diagnosis was documented in both MDM as applicable and the Disposition within this note       Time User Action Codes Description Comment    8/21/2024  7:39 PM Charmaine Gomes Add [R42] Dizziness     8/21/2024 10:30 PM Charmaine Gomes Add [R29.90] Stroke-like symptom     8/21/2024 11:34 PM Charmaine Gomes Add [R59.0] Lymphadenopathy, thoracic           ED Disposition       ED Disposition   Admit    Condition   Stable    Date/Time   Wed Aug 21, 2024 2045    Comment   Case was discussed with  and the patient's admission status was agreed to be  to the service of   .               Follow-up Information    None         Current Discharge Medication List        CONTINUE these medications which have NOT  CHANGED    Details   atorvastatin (LIPITOR) 20 mg tablet Take 20 mg by mouth daily at bedtime      CALCIUM-VITAMIN D PO Take 1 capsule by mouth in the morning      Eliquis 5 MG Take 5 mg by mouth 2 (two) times a day      Empagliflozin (JARDIANCE) 10 MG TABS tablet Take 1 tablet (10 mg total) by mouth every morning  Qty: 30 tablet, Refills: 0    Associated Diagnoses: HFrEF (heart failure with reduced ejection fraction) (HCC)      indomethacin (INDOCIN) 50 mg capsule Take 50 mg by mouth if needed (takes for gout pain as needed)      loratadine (CLARITIN) 10 mg tablet Take 10 mg by mouth daily      metoprolol succinate (TOPROL-XL) 25 mg 24 hr tablet Take 25 mg by mouth daily      Multiple Vitamins-Minerals (multivitamin with minerals) tablet Take 1 tablet by mouth daily      sacubitril-valsartan (Entresto) 24-26 MG TABS Take 1 tablet by mouth 2 (two) times a day      testosterone (ANDROGEL) 25 MG/2.5GM (1%) GEL Place 25 mg on the skin daily      VITAMIN D PO Take 1 capsule by mouth in the morning      acetaminophen (TYLENOL) 325 mg tablet Take 650 mg by mouth every 6 (six) hours as needed for mild pain             No discharge procedures on file.    PDMP Review       None            ED Provider  Electronically Signed by             Charmaine Gomes PA-C  08/21/24 2225

## 2024-08-22 ENCOUNTER — APPOINTMENT (OUTPATIENT)
Dept: MRI IMAGING | Facility: HOSPITAL | Age: 74
End: 2024-08-22
Payer: COMMERCIAL

## 2024-08-22 VITALS
BODY MASS INDEX: 29.38 KG/M2 | WEIGHT: 228.84 LBS | HEART RATE: 64 BPM | OXYGEN SATURATION: 94 % | SYSTOLIC BLOOD PRESSURE: 137 MMHG | DIASTOLIC BLOOD PRESSURE: 72 MMHG | TEMPERATURE: 98 F | RESPIRATION RATE: 18 BRPM

## 2024-08-22 LAB
ALBUMIN SERPL BCG-MCNC: 3.6 G/DL (ref 3.5–5)
ALP SERPL-CCNC: 44 U/L (ref 34–104)
ALT SERPL W P-5'-P-CCNC: 11 U/L (ref 7–52)
ANION GAP SERPL CALCULATED.3IONS-SCNC: 6 MMOL/L (ref 4–13)
AST SERPL W P-5'-P-CCNC: 17 U/L (ref 13–39)
ATRIAL RATE: 108 BPM
ATRIAL RATE: 66 BPM
BASOPHILS # BLD AUTO: 0.01 THOUSANDS/ÂΜL (ref 0–0.1)
BASOPHILS NFR BLD AUTO: 0 % (ref 0–1)
BILIRUB SERPL-MCNC: 0.46 MG/DL (ref 0.2–1)
BUN SERPL-MCNC: 18 MG/DL (ref 5–25)
CALCIUM SERPL-MCNC: 8.7 MG/DL (ref 8.4–10.2)
CHLORIDE SERPL-SCNC: 111 MMOL/L (ref 96–108)
CHOLEST SERPL-MCNC: 109 MG/DL
CO2 SERPL-SCNC: 26 MMOL/L (ref 21–32)
CREAT SERPL-MCNC: 1.1 MG/DL (ref 0.6–1.3)
EOSINOPHIL # BLD AUTO: 0.28 THOUSAND/ÂΜL (ref 0–0.61)
EOSINOPHIL NFR BLD AUTO: 4 % (ref 0–6)
ERYTHROCYTE [DISTWIDTH] IN BLOOD BY AUTOMATED COUNT: 13.9 % (ref 11.6–15.1)
EST. AVERAGE GLUCOSE BLD GHB EST-MCNC: 108 MG/DL
GFR SERPL CREATININE-BSD FRML MDRD: 64 ML/MIN/1.73SQ M
GLUCOSE SERPL-MCNC: 99 MG/DL (ref 65–140)
HBA1C MFR BLD: 5.4 %
HCT VFR BLD AUTO: 49.2 % (ref 36.5–49.3)
HDLC SERPL-MCNC: 36 MG/DL
HGB BLD-MCNC: 16.1 G/DL (ref 12–17)
IMM GRANULOCYTES # BLD AUTO: 0.01 THOUSAND/UL (ref 0–0.2)
IMM GRANULOCYTES NFR BLD AUTO: 0 % (ref 0–2)
LDLC SERPL CALC-MCNC: 60 MG/DL (ref 0–100)
LYMPHOCYTES # BLD AUTO: 1.73 THOUSANDS/ÂΜL (ref 0.6–4.47)
LYMPHOCYTES NFR BLD AUTO: 26 % (ref 14–44)
MCH RBC QN AUTO: 28.6 PG (ref 26.8–34.3)
MCHC RBC AUTO-ENTMCNC: 32.7 G/DL (ref 31.4–37.4)
MCV RBC AUTO: 88 FL (ref 82–98)
MONOCYTES # BLD AUTO: 0.56 THOUSAND/ÂΜL (ref 0.17–1.22)
MONOCYTES NFR BLD AUTO: 8 % (ref 4–12)
NEUTROPHILS # BLD AUTO: 4.12 THOUSANDS/ÂΜL (ref 1.85–7.62)
NEUTS SEG NFR BLD AUTO: 62 % (ref 43–75)
NRBC BLD AUTO-RTO: 0 /100 WBCS
P AXIS: -48 DEGREES
PLATELET # BLD AUTO: 163 THOUSANDS/UL (ref 149–390)
PMV BLD AUTO: 10.8 FL (ref 8.9–12.7)
POTASSIUM SERPL-SCNC: 3.7 MMOL/L (ref 3.5–5.3)
PR INTERVAL: 168 MS
PROT SERPL-MCNC: 5.8 G/DL (ref 6.4–8.4)
QRS AXIS: 48 DEGREES
QRS AXIS: 82 DEGREES
QRSD INTERVAL: 146 MS
QRSD INTERVAL: 158 MS
QT INTERVAL: 420 MS
QT INTERVAL: 434 MS
QTC INTERVAL: 433 MS
QTC INTERVAL: 454 MS
RBC # BLD AUTO: 5.62 MILLION/UL (ref 3.88–5.62)
SODIUM SERPL-SCNC: 143 MMOL/L (ref 135–147)
T WAVE AXIS: 203 DEGREES
T WAVE AXIS: 245 DEGREES
TRIGL SERPL-MCNC: 63 MG/DL
VENTRICULAR RATE: 64 BPM
VENTRICULAR RATE: 66 BPM
WBC # BLD AUTO: 6.71 THOUSAND/UL (ref 4.31–10.16)

## 2024-08-22 PROCEDURE — 99204 OFFICE O/P NEW MOD 45 MIN: CPT | Performed by: PSYCHIATRY & NEUROLOGY

## 2024-08-22 PROCEDURE — 80053 COMPREHEN METABOLIC PANEL: CPT | Performed by: INTERNAL MEDICINE

## 2024-08-22 PROCEDURE — 80061 LIPID PANEL: CPT | Performed by: PHYSICIAN ASSISTANT

## 2024-08-22 PROCEDURE — 99239 HOSP IP/OBS DSCHRG MGMT >30: CPT | Performed by: INTERNAL MEDICINE

## 2024-08-22 PROCEDURE — 70551 MRI BRAIN STEM W/O DYE: CPT

## 2024-08-22 PROCEDURE — 93010 ELECTROCARDIOGRAM REPORT: CPT | Performed by: INTERNAL MEDICINE

## 2024-08-22 PROCEDURE — NC001 PR NO CHARGE: Performed by: INTERNAL MEDICINE

## 2024-08-22 PROCEDURE — 83036 HEMOGLOBIN GLYCOSYLATED A1C: CPT | Performed by: PHYSICIAN ASSISTANT

## 2024-08-22 PROCEDURE — 85025 COMPLETE CBC W/AUTO DIFF WBC: CPT | Performed by: INTERNAL MEDICINE

## 2024-08-22 RX ORDER — MECLIZINE HCL 12.5 MG 12.5 MG/1
12.5 TABLET ORAL EVERY 8 HOURS PRN
Qty: 10 TABLET | Refills: 0 | Status: SHIPPED | OUTPATIENT
Start: 2024-08-22

## 2024-08-22 RX ORDER — ATORVASTATIN CALCIUM 20 MG/1
20 TABLET, FILM COATED ORAL
Status: DISCONTINUED | OUTPATIENT
Start: 2024-08-22 | End: 2024-08-22 | Stop reason: HOSPADM

## 2024-08-22 RX ORDER — LORAZEPAM 2 MG/ML
1 INJECTION INTRAMUSCULAR ONCE
Status: DISCONTINUED | OUTPATIENT
Start: 2024-08-22 | End: 2024-08-22 | Stop reason: HOSPADM

## 2024-08-22 RX ORDER — LORAZEPAM 2 MG/ML
1 INJECTION INTRAMUSCULAR ONCE AS NEEDED
Status: COMPLETED | OUTPATIENT
Start: 2024-08-22 | End: 2024-08-22

## 2024-08-22 RX ADMIN — ATORVASTATIN CALCIUM 20 MG: 20 TABLET, FILM COATED ORAL at 00:01

## 2024-08-22 RX ADMIN — SACUBITRIL AND VALSARTAN 1 TABLET: 24; 26 TABLET, FILM COATED ORAL at 16:00

## 2024-08-22 RX ADMIN — SACUBITRIL AND VALSARTAN 1 TABLET: 24; 26 TABLET, FILM COATED ORAL at 08:37

## 2024-08-22 RX ADMIN — METOPROLOL SUCCINATE 25 MG: 25 TABLET, EXTENDED RELEASE ORAL at 08:37

## 2024-08-22 RX ADMIN — APIXABAN 5 MG: 5 TABLET, FILM COATED ORAL at 00:01

## 2024-08-22 RX ADMIN — ATORVASTATIN CALCIUM 20 MG: 20 TABLET, FILM COATED ORAL at 15:59

## 2024-08-22 RX ADMIN — APIXABAN 5 MG: 5 TABLET, FILM COATED ORAL at 08:37

## 2024-08-22 RX ADMIN — APIXABAN 5 MG: 5 TABLET, FILM COATED ORAL at 16:00

## 2024-08-22 RX ADMIN — LORAZEPAM 1 MG: 2 INJECTION INTRAMUSCULAR; INTRAVENOUS at 15:59

## 2024-08-22 NOTE — NURSING NOTE
The pt was ordered q 4 hour neuro checks, as well as an MRI based on symptoms. I reached out to the admitting provider to clarify if the pt should be on q 1 hour neuro checks (based on stroke pathway). However, the provider would like the pt to remain on q 4 hour neuro checks.    The pt is in bed, the bed alarm is on, bed in lowest position, call bell is within reach of the pt, side rails are up, and the pt is alert and oriented.    Georgi Jasso RN

## 2024-08-22 NOTE — ASSESSMENT & PLAN NOTE
Patient presenting with dizziness that has been persistent for the past several hours patient reports it does not get worse or better with head movement or standing still and closing his eyes.  But he did report worsens when he stands up and ambulates and he feels like his gait is unsteady.   Patient denies any other major symptoms apart from episode of nausea and back pain which occurred after mowing his lawn this evening  Suspect peripheral cause of vertigo, less likely cardiac in less likely a central cause such as CVA, however will rule out with an MRI  CT scan reviewed, neurology recommendations reviewed  Patient does appear to elevated hemoglobin outpatient polycythemia workup recommended, he denies any paresthesias or recurrent headaches

## 2024-08-22 NOTE — OCCUPATIONAL THERAPY NOTE
Occupational Therapy         Patient Name: Amol Montes De Oca  Today's Date: 8/22/2024 08/22/24 0850   OT Last Visit   OT Visit Date 08/22/24   Note Type   Note type Screen   Additional Comments OT consult received, chart reviewed. Spent 8:50-8:54 with this pt and observed him independent ambulate the unit and complete steps. He has adequate functional reach to complete LB ADLs. Reports symptoms have resolved. He does not demonstrate need for skilled IP OT services this will complete OT orders.     Shannon Bridges

## 2024-08-22 NOTE — ED ATTENDING ATTESTATION
8/21/2024  I, Oneyda Yeh DO, saw and evaluated the patient. I have discussed the patient with the resident/non-physician practitioner and agree with the resident's/non-physician practitioner's findings, Plan of Care, and MDM as documented in the resident's/non-physician practitioner's note, except where noted. All available labs and Radiology studies were reviewed.  I was present for key portions of any procedure(s) performed by the resident/non-physician practitioner and I was immediately available to provide assistance.       At this point I agree with the current assessment done in the Emergency Department.  I have conducted an independent evaluation of this patient a history and physical is as follows:    73 yo M presenting for evaluation of dizziness  Sx started 1 hour PTA, no history of similar  Also c/o low back pain, but felt possibly from mowing the lawn earlier  Anticoagulated on Eliquis    Stroke alert initiated given timing of sx, not TNK candidate given Eliquis      ED Course         Critical Care Time  Procedures

## 2024-08-22 NOTE — ASSESSMENT & PLAN NOTE
Continue beta-blocker for A-fib   A-T Advancement Flap Text: The defect edges were debeveled with a #15 scalpel blade.  Given the location of the defect, shape of the defect and the proximity to free margins an A-T advancement flap was deemed most appropriate.  Using a sterile surgical marker, an appropriate advancement flap was drawn incorporating the defect and placing the expected incisions within the relaxed skin tension lines where possible.    The area thus outlined was incised deep to adipose tissue with a #15 scalpel blade.  The skin margins were undermined to an appropriate distance in all directions utilizing iris scissors.

## 2024-08-22 NOTE — PLAN OF CARE

## 2024-08-22 NOTE — PROGRESS NOTES
[facility]  Progress Note  Name: Amol Montes De Oca I  MRN: [id number]  Unit/Bed#: E4 MS Kwame-01 I Date of Admission: 8/21/2024   Date of Service: 8/22/2024 I Hospital Day: 0    Assessment & Plan   * Dizziness  Assessment & Plan  Patient presenting with dizziness that has been persistent for the past several hours patient reports it does not get worse or better with head movement or standing still and closing his eyes.  But he did report worsens when he stands up and ambulates and he feels like his gait is unsteady.     Suspect peripheral cause of vertigo however cannot entirely rule out posterior circulation stroke  Neurology input appreciated  MRI brain pending  Continue Eliquis, statin  Telemetry monitoring  PT/OT    Atrial fibrillation, unspecified type ([facility])  Assessment & Plan  Continue on beta-blocker and Eliquis  Monitor on telemetry    Coronary artery disease involving native coronary artery of native heart with angina pectoris ([facility])  Assessment & Plan  Continue beta-blocker, statin     HFrEF (heart failure with reduced ejection fraction) ([facility])  Assessment & Plan  Wt Readings from Last 3 Encounters:   08/22/24 104 kg (228 lb 13.4 oz)   08/21/24 103 kg (227 lb 1.2 oz)   07/25/24 107 kg (235 lb 9.6 oz)       Patient with history of recent diagnosed heart failure with EF of 35% continue GDMT with beta-blocker and Entresto hold home Jardiance resume his outpatient  Monitor on telemetry  Daily weights ins and outs        Hyperlipidemia  Assessment & Plan  Continue statin    Hypertension  Assessment & Plan  Continue beta-blocker for A-fib             Mobility:  Basic Mobility Inpatient Raw Score: 22  JH-HLM Goal: 7: Walk 25 feet or more  JH-HLM Goal achieved. Continue to encourage appropriate mobility.    VTE Pharmacologic Prophylaxis:   Pharmacologic: Eliquis    Patient Centered Rounds: I have performed bedside rounds with nursing staff today.    Discussions with Specialists or Other Care  Team Provider: Neurology team    Education and Discussions with Family / Patient: Updated spouse at bedside    Time Spent for Care:   More than 50% of total time spent on counseling and coordination of care as described above.    Current Length of Stay: 0 day(s)    Current Patient Status: Observation   Certification Statement: The patient will continue to require additional inpatient hospital stay due to dizziness, MRI brain    Discharge Plan / Estimated Discharge Date: 24h    Code Status: Level 1 - Full Code      Subjective:   Patient seen and examined at bedside, denies any further episodes of dizziness, chest pain, dyspnea    Objective:     Vitals:   Temp (24hrs), Av.9 °F (36.6 °C), Min:97.6 °F (36.4 °C), Max:98.1 °F (36.7 °C)    Temp:  [97.6 °F (36.4 °C)-98.1 °F (36.7 °C)] 98 °F (36.7 °C)  HR:  [57-84] 64  Resp:  [12-46] 18  BP: (115-171)/() 137/72  SpO2:  [91 %-97 %] 94 %  Body mass index is 29.38 kg/m².     Input and Output Summary (last 24 hours):       Intake/Output Summary (Last 24 hours) at 2024 1540  Last data filed at 2024 2106  Gross per 24 hour   Intake 250 ml   Output --   Net 250 ml       Physical Exam:    Constitutional: Patient is oriented to person, place and time, no acute distress  HEENT:  Normocephalic, atraumatic  Cardiovascular: Normal S1S2, RRR, No murmurs/rubs/gallops appreciated.  Pulmonary:  Bilateral air entry, No rhonchi/rales/wheezing appreciated  Abdominal: Soft, Bowel sounds present, Non-tender, Non-distended  Extremities:  No cyanosis, clubbing or edema.   Neurological: awake, aler  Skin:  Warm, dry    Additional Data:     Labs:    Results from last 7 days   Lab Units 24  0411   WBC Thousand/uL 6.71   HEMOGLOBIN g/dL 16.1   HEMATOCRIT % 49.2   PLATELETS Thousands/uL 163   SEGS PCT % 62   LYMPHO PCT % 26   MONO PCT % 8   EOS PCT % 4     Results from last 7 days   Lab Units 24  0411   POTASSIUM mmol/L 3.7   CHLORIDE mmol/L 111*   CO2 mmol/L 26   BUN  mg/dL 18   CREATININE mg/dL 1.10   CALCIUM mg/dL 8.7   ALK PHOS U/L 44   ALT U/L 11   AST U/L 17     Results from last 7 days   Lab Units 08/21/24 1945   INR  1.12        I Have Reviewed All Lab Data Listed Above.    Invasive Devices       Peripheral Intravenous Line  Duration             Peripheral IV 08/21/24 Distal;Right;Upper;Ventral (anterior) Arm <1 day                      Recent Cultures (last 7 days):           Last 24 Hours Medication List:   Current Facility-Administered Medications   Medication Dose Route Frequency Provider Last Rate    apixaban  5 mg Oral BID René Rubio DO      atorvastatin  20 mg Oral Daily With Dinner Charmaine Perez PA-C      LORazepam  1 mg Intravenous Once Charmaine Perez PA-C      LORazepam  1 mg Intravenous Once PRN Charmaine Perez PA-C      metoprolol succinate  25 mg Oral Daily René Rubio DO      sacubitril-valsartan  1 tablet Oral BID René Rubio DO          Today, Patient Was Seen By: Kimberley Miller MD

## 2024-08-22 NOTE — PHYSICAL THERAPY NOTE
PHYSICAL THERAPY SCREEN          Patient Name: Amol Montes De Oca  Today's Date: 8/22/2024 08/22/24 0853   PT Last Visit   PT Visit Date 08/22/24   Note Type   Note type Screen   Additional Comments PT consult recieved. During screen pt ambulated unlimited community distances and negotiated steps. Performed all functional mobility including bed mobility, transfers, ambulation and stair negotiation at indep level. Reports dizziness 99% resolved. Denies concerns w dc home. Appears to be functioning at baseline w no acute deficits impacting functional mobility. will dc orders.       Stephanie Small, PT

## 2024-08-22 NOTE — ASSESSMENT & PLAN NOTE
74-year-old male with A-fib, compliant with Eliquis, hypertension, hyperlipidemia, CAD, and heart failure with reduced ejection fraction, presented to the ED for sudden onset room spinning dizziness and gait dysfunction.  Symptoms began while sitting in his chair watching TV.  No other focal neurologic symptoms reported.  A stroke alert was activated in the ED.  NIHSS 0, but gait was ataxic.  He was not a TNK candidate.  CT/CTA was unremarkable.    Dizziness has resolved this morning and patient is ambulating around the room without difficulty.  Suspect peripheral vertigo, though cannot entirely exclude posterior circulation stroke.    Plan:  - Stroke pathway  MRI brain pending. Premedicate with Ativan  Echo can be deferred at this time, as patient is already on AC and suspicion for stroke is low.   Lipid Panel WNL- LDL 60   Hemoglobin A1c  Continue Eliquis. Can defer addition of AP at this time.   Atorvastatin 40 mg  Gradually resume normotension   Continue telemetry  PT/OT/ST  Frequent neuro checks. Continue to monitor and notify neurology with any changes.   - Medical management and supportive care per primary team. Correction of any metabolic or infectious disturbances.     If symptoms return, can continue to utilize Meclizine and consider outpatient ENT follow up +/- vestibular therapy.     If patient remains asymptomatic and MRI is negative, patient is stable for discharge.

## 2024-08-22 NOTE — DISCHARGE SUMMARY
Formerly [facility], later [facility]  Discharge- Amol Montes De Oca 1950, 74 y.o. male MRN: [id number]  Unit/Bed#: E4 -01 Encounter: [id number]  Primary Care Provider: Douglas Charles Shoenberger, MD   Date and time admitted to hospital: 8/21/2024  7:22 PM    * Dizziness  Assessment & Plan  Patient presenting with dizziness that has been persistent for the past several hours patient reports it does not get worse or better with head movement or standing still and closing his eyes.  But he did report worsens when he stands up and ambulates and he feels like his gait is unsteady.     Suspect peripheral cause of vertigo however cannot entirely rule out posterior circulation stroke  Neurology input appreciated  MRI brain negative for any acute intracranial abnormality  Continue Eliquis, statin  Meclizine as needed    Atrial fibrillation, unspecified type ([facility])  Assessment & Plan  Continue on beta-blocker and Eliquis  Monitor on telemetry    Coronary artery disease involving native coronary artery of native heart with angina pectoris ([facility])  Assessment & Plan  Continue beta-blocker, statin     HFrEF (heart failure with reduced ejection fraction) ([facility])  Assessment & Plan  Wt Readings from Last 3 Encounters:   08/22/24 104 kg (228 lb 13.4 oz)   08/21/24 103 kg (227 lb 1.2 oz)   07/25/24 107 kg (235 lb 9.6 oz)       Patient with history of recent diagnosed heart failure with EF of 35% continue GDMT with beta-blocker and Entresto hold home Jardiance resume his outpatient  Monitor on telemetry  Daily weights ins and outs        Hyperlipidemia  Assessment & Plan  Continue statin    Hypertension  Assessment & Plan  Continue beta-blocker for A-fib        Transition of Care Discharge Summary - [facility]    Patient Information: Amol Montes De Oca 74 y.o. male MRN: [id number]  Unit/Bed#: E4 -01 Encounter: [id number]    Discharging Physician / Practitioner: Kimberley Miller MD  PCP: Reagan Lucas  Shoenberger, MD  Admission Date: 8/21/2024  Discharge Date: 08/22/24    Disposition:      Other: home      Reason for Admission: dizziness    Discharge Diagnoses:     Principal Problem:    Dizziness  Active Problems:    Hypertension    Hyperlipidemia    HFrEF (heart failure with reduced ejection fraction) ([facility])    Coronary artery disease involving native coronary artery of native heart with angina pectoris (HCC)    Atrial fibrillation, unspecified type ([facility])  Resolved Problems:    * No resolved hospital problems. *      Consultations During Hospital Stay:  IP CONSULT TO NEUROLOGY  IP CONSULT TO CASE MANAGEMENT      Procedures Performed:     none    Medication Adjustments and Discharge Medications:  Medication Dosing Tapers - Please refer to Discharge Medication List for details on any medication dosing tapers (if applicable to patient).  Discharge Medication List: See after visit summary for reconciled discharge medications.     Wound Care Recommendations:  When applicable, please see wound care section of After Visit Summary.    Diet Recommendations at Discharge:  Diet -        Diet Orders   (From admission, onward)                 Start     Ordered    08/21/24 2353  Diet Regular; Regular House  Diet effective now        References:    Adult Nutrition Support Algorithm    RD Therapeutic Diet Order Protocol   Question Answer Comment   Diet Type Regular    Regular Regular House    RD to adjust diet per protocol? Yes        08/21/24 2353                  Fluid Restriction - No Fluid Restriction at Discharge.      Significant Findings / Test Results:     CT abdomen pelvis with contrast    Result Date: 8/21/2024  Impression: No identifiable acute abnormality to account for the patient's clinical presentation. Workstation performed: [id number]     CT stroke alert brain    Result Date: 8/21/2024  Impression: No acute intracranial abnormality. Findings were directly discussed with Jacinto Cortes at   8:19 PM  . Workstation  performed: [id number]     CTA stroke alert (head/neck)    Result Date: 8/21/2024  Impression: 1. No stenosis, dissection or occlusion of the carotid or vertebral arteries or major vessels of the [place]. 2. Contrast blush in the left ventromedial thalamus could represent a venous anomaly. No evidence of underlying lesion.. Findings were directly discussed with Jacinto Cortes at  8:19 PM  . Workstation performed: [id number]      Hospital Course:     Amlo Montes De Oca is a 74 y.o. male patient who originally presented to the hospital on 8/21/2024 due to dizziness.  Patient has history of atrial fibrillation on Eliquis, hypertension, hyperlipidemia, CAD, CHF presented with dizziness, CTA head unremarkable.  Neurology consulted patient underwent MRI brain without any intracranial abnormalities.  Patient will be discharged home on meclizine as needed.  Outpatient follow-up.  Please see above problem list for further details.      Condition at Discharge: good     Discharge Day Visit / Exam:     Subjective: Patient seen examined at bedside, denies further episodes of dizziness    Vitals: Blood Pressure: 137/72 (08/22/24 1505)  Pulse: 64 (08/22/24 1505)  Temperature: 98 °F (36.7 °C) (08/22/24 1505)  Temp Source: Temporal (08/22/24 1505)  Respirations: 18 (08/22/24 1505)  Weight - Scale: 104 kg (228 lb 13.4 oz) (08/22/24 0600)  SpO2: 94 % (08/22/24 1505)    Physical Exam:    Constitutional: Patient is oriented to person, place and time, no acute distress  HEENT:  Normocephalic, atraumatic  Cardiovascular: Normal S1S2, RRR, No murmurs/rubs/gallops appreciated.  Pulmonary:  Bilateral air entry, No rhonchi/rales/wheezing appreciated  Abdominal: Soft, Bowel sounds present, Non-tender, Non-distended  Extremities:  No cyanosis, clubbing or edema.   Neurological: awake, alert    Discharge instructions/Information to patient and family:   See after visit summary section titled Discharge Instructions for information provided to  patient and family.      Planned Readmission: no      Discharge Statement:  I spent 35 minutes discharging the patient. This time was spent on the day of discharge. I had direct contact with the patient on the day of discharge. Greater than 50% of the total time was spent examining patient, answering all patient questions, arranging and discussing plan of care with patient as well as directly providing post-discharge instructions.  Additional time then spent on discharge activities.    ** Please Note: This note has been constructed using a voice recognition system **

## 2024-08-22 NOTE — ASSESSMENT & PLAN NOTE
Wt Readings from Last 3 Encounters:   08/21/24 103 kg (227 lb 15.3 oz)   08/21/24 103 kg (227 lb 1.2 oz)   07/25/24 107 kg (235 lb 9.6 oz)       Patient with history of recent diagnosed heart failure with EF of 35% continue GDMT with beta-blocker and Entresto hold home Jardiance resume his outpatient  Monitor on telemetry  Daily weights ins and outs

## 2024-08-22 NOTE — H&P
[facility]  H&P  Name: Amol Montes De Oca 74 y.o. male I MRN: [id number]  Unit/Bed#: E4 -01 I Date of Admission: 8/21/2024   Date of Service: 8/21/2024 I Hospital Day: 0      Assessment & Plan   Dizziness  Assessment & Plan  Patient presenting with dizziness that has been persistent for the past several hours patient reports it does not get worse or better with head movement or standing still and closing his eyes.  But he did report worsens when he stands up and ambulates and he feels like his gait is unsteady.   Patient denies any other major symptoms apart from episode of nausea and back pain which occurred after mowing his lawn this evening  Suspect peripheral cause of vertigo, less likely cardiac in less likely a central cause such as CVA, however will rule out with an MRI  CT scan reviewed, neurology recommendations reviewed  Patient does appear to elevated hemoglobin outpatient polycythemia workup recommended, he denies any paresthesias or recurrent headaches    Atrial fibrillation, unspecified type (HCC)  Assessment & Plan  Continue on beta-blocker and Eliquis  Monitor on telemetry    Coronary artery disease involving native coronary artery of native heart with angina pectoris ([facility]ly [facility])  Assessment & Plan  Continue beta-blocker statin Eliquis    HFrEF (heart failure with reduced ejection fraction) ([facility])  Assessment & Plan  Wt Readings from Last 3 Encounters:   08/21/24 103 kg (227 lb 15.3 oz)   08/21/24 103 kg (227 lb 1.2 oz)   07/25/24 107 kg (235 lb 9.6 oz)       Patient with history of recent diagnosed heart failure with EF of 35% continue GDMT with beta-blocker and Entresto hold home Jardiance resume his outpatient  Monitor on telemetry  Daily weights ins and outs        Hyperlipidemia  Assessment & Plan  Continue statin    Hypertension  Assessment & Plan  Continue beta-blocker for A-fib  Check orthostatics             VTE Pharmacologic Prophylaxis: VTE Score: 0 Moderate Risk  (Score 3-4) - Pharmacological DVT Prophylaxis Ordered: apixaban (Eliquis).  Code Status: Level 1 - Full Code level 1  Discussion with family: Updated  (significant other) at bedside.    Anticipated Length of Stay: Patient will be admitted on an observation basis with an anticipated length of stay of less than 2 midnights secondary to dizziness.    Total Time Spent on Date of Encounter in care of patient:  mins. This time was spent on one or more of the following: performing physical exam; counseling and coordination of care; obtaining or reviewing history; documenting in the medical record; reviewing/ordering tests, medications or procedures; communicating with other healthcare professionals and discussing with patient's family/caregivers.    Chief Complaint: dizziness    History of Present Illness:  Amol Montes De Oca is a 74 y.o. male with a PMH of CAD, hypertension, hyperlipidemia, A-fib on Eliquis who presents with dizziness.  Patient reports acute onset of dizziness several hours prior to presentation to the ER that has remained constant is not affected by head movement or closing his eyes but does appear to get worse when he stands up and ambulates and causes him to have an unsteady gait.  Reports an episode of nausea and back pain which occurred after mowing his lawn earlier today.  Patient reports overall his symptoms have improved and during my evaluation does not feel dizzy anymore, although when he ambulated to the bathroom he did feel the room started spinning and felt like he had on unsteady gait.  He denies any shortness of breath chest pain abdominal pain numbness or tingling in his extremities vision changes headache.     Review of Systems:  Review of Systems   Constitutional:  Negative for chills, fatigue and fever.   HENT:  Negative for ear pain and sore throat.    Eyes:  Negative for pain and visual disturbance.   Respiratory:  Negative for apnea, cough, shortness of breath and wheezing.     Cardiovascular:  Negative for chest pain, palpitations and leg swelling.   Gastrointestinal:  Negative for abdominal distention, abdominal pain, diarrhea, nausea and vomiting.   Genitourinary:  Negative for dysuria and hematuria.   Musculoskeletal:  Negative for arthralgias and back pain.   Skin:  Negative for color change and rash.   Neurological:  Positive for dizziness. Negative for seizures and syncope.   All other systems reviewed and are negative.      Past Medical and Surgical History:   Past Medical History:   Diagnosis Date    COVID-19     1/2023 recoverd @ home    GERD (gastroesophageal reflux disease)     Gout     Hyperlipidemia     Hypertension     Serum cholesterol elevated        Past Surgical History:   Procedure Laterality Date    CARDIAC CATHETERIZATION Left 7/22/2024    Procedure: Cardiac catheterization;  Surgeon: Elizabeth Dawn DO;  Location: [facility];  Service: Cardiology    CARDIAC CATHETERIZATION N/A 7/22/2024    Procedure: Cardiac Coronary Angiogram;  Surgeon: Elizabeth Dawn DO;  Location: [facility];  Service: Cardiology    COLONOSCOPY      LIPOMA RESECTION Left     Left lower abdomen/groin area    SUBMANDIBULAR GLAND EXCISION Left 6/14/2023    Procedure: SUBMANDIBULAR GLAND EXICISON;  Surgeon: Andrew Hummel MD;  Location: [facility];  Service: ENT       Meds/Allergies:  Prior to Admission medications    Medication Sig Start Date End Date Taking? Authorizing Provider   atorvastatin (LIPITOR) 20 mg tablet Take 20 mg by mouth daily at bedtime   Yes Historical Provider, MD   CALCIUM-VITAMIN D PO Take 1 capsule by mouth in the morning   Yes Historical Provider, MD   Eliquis 5 MG Take 5 mg by mouth 2 (two) times a day 7/8/24  Yes Historical Provider, MD   Empagliflozin (JARDIANCE) 10 MG TABS tablet Take 1 tablet (10 mg total) by mouth every morning 7/25/24  Yes Ursula Palma MD   indomethacin (INDOCIN) 50 mg capsule Take 50 mg by mouth if needed (takes  for gout pain as needed)   Yes Historical Provider, MD   loratadine (CLARITIN) 10 mg tablet Take 10 mg by mouth daily   Yes Historical Provider, MD   metoprolol succinate (TOPROL-XL) 25 mg 24 hr tablet Take 25 mg by mouth daily 7/8/24  Yes Historical Provider, MD   Multiple Vitamins-Minerals (multivitamin with minerals) tablet Take 1 tablet by mouth daily   Yes Historical Provider, MD   sacubitril-valsartan (Entresto) 24-26 MG TABS Take 1 tablet by mouth 2 (two) times a day 7/9/24  Yes Historical Provider, MD   testosterone (ANDROGEL) 25 MG/2.5GM (1%) GEL Place 25 mg on the skin daily 6/18/24  Yes Historical Provider, MD   VITAMIN D PO Take 1 capsule by mouth in the morning   Yes Historical Provider, MD   acetaminophen (TYLENOL) 325 mg tablet Take 650 mg by mouth every 6 (six) hours as needed for mild pain    Historical Provider, MD     I have reviewed home medications with patient personally.    Allergies: No Known Allergies    Social History:  Marital Status: /Civil Union   Occupation:   Patient Pre-hospital Living Situation: Home  Patient Pre-hospital Level of Mobility: walks  Patient Pre-hospital Diet Restrictions:   Substance Use History:   Social History     Substance and Sexual Activity   Alcohol Use Not Currently    Comment: 1 drink a day. varies beer wine or liqour 6/12     Social History     Tobacco Use   Smoking Status Never   Smokeless Tobacco Never   Tobacco Comments    No smoking in the home     Social History     Substance and Sexual Activity   Drug Use Never       Family History:  History reviewed. No pertinent family history.    Physical Exam:     Vitals:   Blood Pressure: 131/82 (08/21/24 2145)  Pulse: 70 (08/21/24 2145)  Temperature: 98.1 °F (36.7 °C) (08/21/24 1921)  Temp Source: Oral (08/21/24 1921)  Respirations: 20 (08/21/24 2145)  Weight - Scale: 103 kg (227 lb 15.3 oz) (08/21/24 1921)  SpO2: 94 % (08/21/24 2145)    Physical Exam  Vitals and nursing note reviewed.   Constitutional:      "  General: He is not in acute distress.     Appearance: He is well-developed. He is not toxic-appearing or diaphoretic.   HENT:      Head: Normocephalic and atraumatic.   Eyes:      General: No scleral icterus.     Conjunctiva/sclera: Conjunctivae normal.   Cardiovascular:      Rate and Rhythm: Normal rate and regular rhythm.      Heart sounds: No murmur heard.     No friction rub. No gallop.   Pulmonary:      Effort: Pulmonary effort is normal. No respiratory distress.      Breath sounds: Normal breath sounds. No stridor. No wheezing, rhonchi or rales.   Chest:      Chest wall: No tenderness.   Abdominal:      General: There is no distension.      Palpations: Abdomen is soft. There is no mass.      Tenderness: There is no abdominal tenderness. There is no guarding or rebound.      Hernia: No hernia is present.   Musculoskeletal:         General: No swelling or tenderness.      Cervical back: Neck supple.   Skin:     General: Skin is warm and dry.      Capillary Refill: Capillary refill takes less than 2 seconds.   Neurological:      Mental Status: He is alert and oriented to person, place, and time.   Psychiatric:         Mood and Affect: Mood normal.          Additional Data:     Lab Results:  Results from last 7 days   Lab Units 08/21/24 1945   WBC Thousand/uL 7.43   HEMOGLOBIN g/dL 17.7*   HEMATOCRIT % 54.2*   PLATELETS Thousands/uL 176     Results from last 7 days   Lab Units 08/21/24 1945   SODIUM mmol/L 143   POTASSIUM mmol/L 3.8   CHLORIDE mmol/L 108   CO2 mmol/L 30   BUN mg/dL 16   CREATININE mg/dL 1.09   ANION GAP mmol/L 5   CALCIUM mg/dL 9.5   GLUCOSE RANDOM mg/dL 98     Results from last 7 days   Lab Units 08/21/24 1945   INR  1.12     Results from last 7 days   Lab Units 08/21/24 1941   POC GLUCOSE mg/dl 105     No results found for: \"HGBA1C\"        Lines/Drains:  Invasive Devices       Peripheral Intravenous Line  Duration             Peripheral IV 08/21/24 Distal;Right;Upper;Ventral (anterior) Arm " <1 day                        Imaging: Reviewed radiology reports from this admission including: CT head  CT abdomen pelvis with contrast   Final Result by Iker Farah MD (08/21 2106)      No identifiable acute abnormality to account for the patient's clinical presentation.         Workstation performed: [id number]         CTA stroke alert (head/neck)   Final Result by Cameron Sanford MD (08/21 2021)         1. No stenosis, dissection or occlusion of the carotid or vertebral arteries or major vessels of the [place].   2. Contrast blush in the left ventromedial thalamus could represent a venous anomaly. No evidence of underlying lesion..               Findings were directly discussed with Jacinto Cortes at  8:19 PM  .      Workstation performed: [id number]         CT stroke alert brain   Final Result by Cameron Sanford MD (08/21 2022)      No acute intracranial abnormality.      Findings were directly discussed with Jacinto Cortes at   8:19 PM  .      Workstation performed: [id number]         MRI inpatient order    (Results Pending)       EKG and Other Studies Reviewed on Admission:   EKG:  EKG reviewied.    ** Please Note: This note has been constructed using a voice recognition system. **

## 2024-08-22 NOTE — ASSESSMENT & PLAN NOTE
Patient presenting with dizziness that has been persistent for the past several hours patient reports it does not get worse or better with head movement or standing still and closing his eyes.  But he did report worsens when he stands up and ambulates and he feels like his gait is unsteady.     Suspect peripheral cause of vertigo however cannot entirely rule out posterior circulation stroke  Neurology input appreciated  MRI brain pending  Continue Eliquis, statin  Telemetry monitoring  PT/OT

## 2024-08-22 NOTE — ASSESSMENT & PLAN NOTE
Wt Readings from Last 3 Encounters:   08/22/24 104 kg (228 lb 13.4 oz)   08/21/24 103 kg (227 lb 1.2 oz)   07/25/24 107 kg (235 lb 9.6 oz)       Patient with history of recent diagnosed heart failure with EF of 35% continue GDMT with beta-blocker and Entresto hold home Jardiance resume his outpatient  Monitor on telemetry  Daily weights ins and outs

## 2024-08-22 NOTE — CONSULTS
Consultation - Neurology   Amol Montes De Oca 74 y.o. male MRN: [id number]  Unit/Bed#: E4 -01 Encounter: [id number]      Assessment & Plan     Atrial fibrillation, unspecified type (HCC)  Assessment & Plan  - Continue Eliquis  - No missed doses    * Dizziness  Assessment & Plan  74-year-old male with A-fib, compliant with Eliquis, hypertension, hyperlipidemia, CAD, and heart failure with reduced ejection fraction, presented to the ED for sudden onset room spinning dizziness and gait dysfunction.  Symptoms began while sitting in his chair watching TV.  No other focal neurologic symptoms reported.  A stroke alert was activated in the ED.  NIHSS 0, but gait was ataxic.  He was not a TNK candidate.  CT/CTA was unremarkable.    Dizziness has resolved this morning and patient is ambulating around the room without difficulty.  Suspect peripheral vertigo, though cannot entirely exclude posterior circulation stroke.    Plan:  - Stroke pathway  MRI brain pending. Premedicate with Ativan  Echo can be deferred at this time, as patient is already on AC and suspicion for stroke is low.   Lipid Panel WNL- LDL 60   Hemoglobin A1c  Continue Eliquis. Can defer addition of AP at this time.   Atorvastatin 40 mg  Gradually resume normotension   Continue telemetry  PT/OT/ST  Frequent neuro checks. Continue to monitor and notify neurology with any changes.   - Medical management and supportive care per primary team. Correction of any metabolic or infectious disturbances.     If symptoms return, can continue to utilize Meclizine and consider outpatient ENT follow up +/- vestibular therapy.     If patient remains asymptomatic and MRI is negative, patient is stable for discharge.           If MRI is negative for stroke, patient does not need neurology follow up. If MRI reveals acute stroke, patient should follow up with neurovascular team in 6 weeks.     History of Present Illness     Reason for Consult / Principal Problem: Dizziness  "    HPI: Amol Montes De Oca is a 74 y.o. male with CAD, hypertension, hyperlipidemia, A-fib on Eliquis, and heart failure with reduced ejection fraction, presented to the ED on 8/21 for evaluation of room spinning dizziness.  Symptoms began suddenly about an hour prior to arriving in the ED. He was sitting in his chair watching TV when it started. Symptoms were present even when his head was still.  He reports associated nausea and gait dysfunction.  He felt diaphoretic when the dizziness began.  By the time he arrived in the ED, symptoms were slightly improved, but still present. Blood pressure on arrival 134/90.  Other vital signs stable.  CTA head and neck was performed with no stenosis, dissection, or occlusion.  There was \"contrast blush in the left ventral medial thalamus\" which could represent a venous anomaly.  His EKG did show A-fib in the ED.NIHSS reportedly 0 in the ED, but he had an ataxic gait. Reportedly patient felt better after meclizine.     Patient reports he has had similar brief episodes in the past.     Inpatient consult to Neurology  Consult performed by: Charmaine Perez PA-C  Consult ordered by: Charmaine Gomes PA-C          Review of Systems   Musculoskeletal:         Gait dysfunction resolved   Neurological:         Dizziness resolved       Historical Information   Past Medical History:   Diagnosis Date    COVID-19     1/2023 recoverd @ home    GERD (gastroesophageal reflux disease)     Gout     Hyperlipidemia     Hypertension     Serum cholesterol elevated      Past Surgical History:   Procedure Laterality Date    CARDIAC CATHETERIZATION Left 7/22/2024    Procedure: Cardiac catheterization;  Surgeon: Elizabeth Dawn DO;  Location: [facility];  Service: Cardiology    CARDIAC CATHETERIZATION N/A 7/22/2024    Procedure: Cardiac Coronary Angiogram;  Surgeon: Elizabeth Dawn DO;  Location: [facility];  Service: Cardiology    COLONOSCOPY      LIPOMA RESECTION Left "     Left lower abdomen/groin area    SUBMANDIBULAR GLAND EXCISION Left 6/14/2023    Procedure: SUBMANDIBULAR GLAND EXICISON;  Surgeon: Andrew Hummel MD;  Location: [facility];  Service: ENT     Social History   Social History     Substance and Sexual Activity   Alcohol Use Not Currently    Comment: 1 drink a day. varies beer wine or liqour 6/12     Social History     Substance and Sexual Activity   Drug Use Never     E-Cigarette/Vaping    E-Cigarette Use Never User      E-Cigarette/Vaping Substances    Nicotine No     THC No     CBD No      Social History     Tobacco Use   Smoking Status Never   Smokeless Tobacco Never   Tobacco Comments    No smoking in the home     Family History: History reviewed. No pertinent family history.    Review of previous medical records was completed.     Meds/Allergies   all current active meds have been reviewed, current meds:   Current Facility-Administered Medications   Medication Dose Route Frequency    apixaban (ELIQUIS) tablet 5 mg  5 mg Oral BID    atorvastatin (LIPITOR) tablet 20 mg  20 mg Oral Daily With Dinner    LORazepam (ATIVAN) injection 1 mg  1 mg Intravenous Once    LORazepam (ATIVAN) injection 1 mg  1 mg Intravenous Once PRN    metoprolol succinate (TOPROL-XL) 24 hr tablet 25 mg  25 mg Oral Daily    sacubitril-valsartan (ENTRESTO) 24-26 MG per tablet 1 tablet  1 tablet Oral BID   , and PTA meds:   Prior to Admission Medications   Prescriptions Last Dose Informant Patient Reported? Taking?   CALCIUM-VITAMIN D PO 8/20/2024 Self Yes Yes   Sig: Take 1 capsule by mouth in the morning   Eliquis 5 MG 8/20/2024 Self Yes Yes   Sig: Take 5 mg by mouth 2 (two) times a day   Empagliflozin (JARDIANCE) 10 MG TABS tablet 8/21/2024  No Yes   Sig: Take 1 tablet (10 mg total) by mouth every morning   Multiple Vitamins-Minerals (multivitamin with minerals) tablet 8/21/2024 Self Yes Yes   Sig: Take 1 tablet by mouth daily   VITAMIN D PO 8/21/2024 Self Yes Yes   Sig: Take 1  capsule by mouth in the morning   acetaminophen (TYLENOL) 325 mg tablet More than a month Self Yes No   Sig: Take 650 mg by mouth every 6 (six) hours as needed for mild pain   atorvastatin (LIPITOR) 20 mg tablet 8/21/2024 Self Yes Yes   Sig: Take 20 mg by mouth daily at bedtime   indomethacin (INDOCIN) 50 mg capsule Past Week Self Yes Yes   Sig: Take 50 mg by mouth if needed (takes for gout pain as needed)   loratadine (CLARITIN) 10 mg tablet 8/20/2024 Self Yes Yes   Sig: Take 10 mg by mouth daily   metoprolol succinate (TOPROL-XL) 25 mg 24 hr tablet 8/21/2024 Self Yes Yes   Sig: Take 25 mg by mouth daily   sacubitril-valsartan (Entresto) 24-26 MG TABS 8/21/2024 Self Yes Yes   Sig: Take 1 tablet by mouth 2 (two) times a day   testosterone (ANDROGEL) 25 MG/2.5GM (1%) GEL 8/21/2024 Self Yes Yes   Sig: Place 25 mg on the skin daily      Facility-Administered Medications: None       No Known Allergies    Objective   Vitals:Blood pressure 149/94, pulse 83, temperature 98 °F (36.7 °C), temperature source Temporal, resp. rate 18, weight 104 kg (228 lb 13.4 oz), SpO2 95%.,Body mass index is 29.38 kg/m².    Intake/Output Summary (Last 24 hours) at 8/22/2024 1450  Last data filed at 8/21/2024 2106  Gross per 24 hour   Intake 250 ml   Output --   Net 250 ml       Invasive Devices:   Invasive Devices       Peripheral Intravenous Line  Duration             Peripheral IV 08/21/24 Distal;Right;Upper;Ventral (anterior) Arm <1 day                        Physical Exam:   Vital signs and nursing notes reviewed.     Constitutional: Appears comfortable. Well developed/well nourished. No diaphoresis. No acute distress.   HENT: Normocephalic, atraumatic. B/L external ears and nose appears normal. Oropharynx clear and moist.   Eyes: EOMs intact without nystagmus. No scleral icterus or injection. No discharge.   Neck: Supple, normal ROM. No stridor noted.   Cardiovascular: Regular rate.   Pulmonary: No respiratory distress. Effort normal.  No stridor or wheezing evident.   Musculoskeletal: Normal ROM. No tenderness, edema, or deformities noted.  Neurological: Detailed below.   Skin: Warm and dry. No erythema or rashes. No jaundice.  Psychiatric: Normal mood and affect, normal thought process/thought content. No hallucinations. Good insight.     Neurologic Exam:  Mental Status: Patient is awake and alert. Oriented x 3. Follows all commands without difficulty. No dysarthria. No aphasia.     Cranial Nerves: Cranial nerves 2-12 intact.     Motor: 5/5 strength throughout bilateral upper and lower extremities.     Sensation: Sensation to light touch intact throughout.     Coordination: Finger to nose testing normal. Heel to shin normal.     No tremors noted.     Gait: Steady     Lab Results: I have personally reviewed pertinent reports.    Imaging Studies: I have personally reviewed pertinent reports.   and I have personally reviewed pertinent films in PACS (CTA)  EKG, Pathology, and Other Studies: I have personally reviewed pertinent reports.    VTE Prophylaxis: Eliquis     Code Status: Level 1 - Full Code

## 2024-09-09 NOTE — PROGRESS NOTES
Advanced Heart Failure Outpatient Progress Note - Amol Montes De Oca 74 y.o. male MRN: [id number]    Encounter: [id number]      Assessment/Plan:    Patient Active Problem List    Diagnosis Date Noted    Dizziness 08/21/2024    Atrial fibrillation, unspecified type ([facility]) 07/25/2024    Coronary artery disease involving native coronary artery of native heart with angina pectoris ([facility]) 07/22/2024    HFrEF (heart failure with reduced ejection fraction) ([facility]) 07/10/2024    Hypertension     Hyperlipidemia        # New onset heart failure with reduced EF, 30-35% per documentation, no imaging or report available for review  Etiology: Ischemic and nonischemic components.  Severe stenosis of proximal LAD on cardiac catheterization. Degree of reported cardiomyopathy out of proportion to CAD.  Paroxysmal atrial fibrillation on Zio.  Dyssynchrony with wide LBBB    Neurohormonal Blockade:  --Beta-Blocker: metoprolol succinate 25mg daily  --ACEi, ARB or ARNi: entresto 24-26mg BID  --Aldosterone Receptor Blocker: none  --SGLT2 Inhibitor: Jardiance 10mg daily  --Diuretic: none    Sudden Cardiac Death Risk Reduction:  --ICD: assess for improvement with medical therapy, management of afib +/- CRT    Electrical Resynchronization:  --Candidacy for BiV device: LBBB, QRSd 166-170msec    # LBBB, QRSd 166-170 msec    # PACs, PVCs  Low burden on 1 week holter 7/1/24  Metoprolol as above    # Paroxysmal atrial fibrillation   30% burden on 1 week holter 7/1/24  Continue with Toprol for rate control  Continue anticoagulation with Eliquis  2-week ZIO 8/6/24: Patient had a min HR of 45 bpm, max HR of 130 bpm, and avg HR of 66 bpm. Predominant underlying rhythm was Sinus Rhythm. Bundle Branch Block/IVCD was present. 1 run of Ventricular Tachycardia occurred lasting 6 beats with a max rate of 130 bpm (avg 116 bpm). 260 Supraventricular Tachycardia runs occurred, the run with the fastest interval lasting 7 beats with a max rate of 126 bpm, the longest  "lasting 20.4 secs with an avg rate of 103 bpm. Isolated SVEs were frequent (9.0%, 08135), SVE Couplets were rare (<1.0%, 404), and SVE Triplets were rare (<1.0%, 93). Isolated VEs were rare (<1.0%, 3800), VE Couplets were rare (<1.0%, 524), and VE Triplets were rare (<1.0%, 141  \"Reviewed strips and agree. DIfficult to tell if afib, P waves are small but rhythm is regular.  He has plan for BIV ICD and is on anticoagulation. NO changes recommended. \" Per Dr. Mcfarland.    # Coronary artery disease, 80% stenosis of proximal LAD  On monotherapy with apixaban for afib, continue statin  No anginal symptoms    # Vertigo      Today's Plan:  Start spironolactone 12.5mg daily  Obtain BMP in 1 week  Home monitoring of blood pressure  Obtain cardiac MRI as scheduled  Plan for BIV ICD if EF still </= 35% after 3 months of medical therapy  Consideration for revascularization pending cardiac MRI to assess for viability  Follow up with Dr. Mcfarland as scheduled      HPI:   7/25/2024: 74-year-old male with past medical history of hypertension, hyperlipidemia, gout who is referred by Dr. Mcfarland for heart failure evaluation.  Patient was undergoing regular 6-month follow-up with PCP about 3 to 4 weeks ago when he was noted to have abnormal EKG, and possible A-fib.  Also had an echocardiogram which reportedly had a reduced EF at 30 to 35%.  ARB was switched to Entresto and metoprolol was added.  He was also started on anticoagulation with Eliquis.  He was seen by Dr. Mcfarland on July 10 for evaluation.  Office EKG noted to have normal sinus rhythm with premature atrial contractions and left bundle branch block.  Patient had a 1 week Zio prior to that office visit with concern for possible atrial fibrillation but report not available for review at that time.  Cardiac catheterization was ordered to rule out ischemic cardiomyopathy, referred to heart failure for further evaluation and management and 2-week Zio ordered.  1 week ZIO on July 1 " "showed predominantly normal sinus rhythm with AF burden of 30%.  PVC burden 4.6%.  PAC burden 5.2%.  Average heart rate during normal sinus rhythm 70 bpm, 77 bpm during atrial fibrillation.  He had a cardiac catheterization on 2024 which showed 80% proximal LAD stenosis.  Interventional cardiology recommended CTS evaluation for CABG/RACHEL ligation and possible viability study.   He is currently completing the 2-week heart monitor.  Patient reports overall stable exertional capacity leading up to routine follow-up with PCP.  He denies leg swelling, PND orthopnea.  He is able to walk 2 flights of stairs and would need to catch his breath for short period.  Denies palpitations or lightheadedness.  He has intermittent of dizziness due to vertigo.  No syncope.  FH: father  young at 62, had open heart surgery and also had cancer at that time   SH: No smoking or drug use, alcohol intake of 1 drink a day, recently discontinued    9/10/2024: Patient is here for follow-up.  Started Jardiance on last visit.  Repeat echo still overdue.  Cardiac MRI canceled by patient per documentation.  Admitted 2024 for dizziness.  CTA of the head was unremarkable.  Neurology was consulted and patient underwent brain MRI without any intracranial abnormalities.  Patient discharged home on meclizine as needed for peripheral vertigo.  2024 sodium 143 potassium 3.7 creatinine 1.1. Home sleep study with PCP - \"very mild sleep apnea\". Denies chest pain or shortness of breath on current level of exertion. No leg swelling, PND or orthopnea.     Past Medical History:   Diagnosis Date    COVID-19     2023 recoverd @ home    GERD (gastroesophageal reflux disease)     Gout     Hyperlipidemia     Hypertension     Serum cholesterol elevated        Review of Systems   Constitutional:  Negative for chills, fatigue and fever.   HENT:  Negative for ear pain and sore throat.    Eyes:  Negative for pain and visual disturbance. "   Respiratory:  Negative for cough, chest tightness and shortness of breath.    Cardiovascular:  Negative for chest pain, palpitations and leg swelling.   Gastrointestinal:  Negative for abdominal distention, abdominal pain and vomiting.   Genitourinary:  Negative for dysuria and hematuria.   Musculoskeletal:  Negative for arthralgias and back pain.   Skin:  Negative for color change and rash.   Neurological:  Negative for seizures, syncope and light-headedness.   All other systems reviewed and are negative.       No Known Allergies  .    Current Outpatient Medications:     acetaminophen (TYLENOL) 325 mg tablet, Take 650 mg by mouth every 6 (six) hours as needed for mild pain, Disp: , Rfl:     atorvastatin (LIPITOR) 20 mg tablet, Take 20 mg by mouth daily at bedtime, Disp: , Rfl:     CALCIUM-VITAMIN D PO, Take 1 capsule by mouth in the morning, Disp: , Rfl:     Eliquis 5 MG, Take 5 mg by mouth 2 (two) times a day, Disp: , Rfl:     Empagliflozin (JARDIANCE) 10 MG TABS tablet, Take 1 tablet (10 mg total) by mouth every morning, Disp: 30 tablet, Rfl: 0    loratadine (CLARITIN) 10 mg tablet, Take 10 mg by mouth daily, Disp: , Rfl:     LORazepam (ATIVAN) 0.5 mg tablet, Take 0.5 mg by mouth 2 (two) times a day As needed for MRI, Disp: , Rfl:     meclizine (ANTIVERT) 12.5 MG tablet, Take 1 tablet (12.5 mg total) by mouth every 8 (eight) hours as needed for dizziness, Disp: 10 tablet, Rfl: 0    metoprolol succinate (TOPROL-XL) 25 mg 24 hr tablet, Take 25 mg by mouth daily, Disp: , Rfl:     Multiple Vitamins-Minerals (multivitamin with minerals) tablet, Take 1 tablet by mouth daily, Disp: , Rfl:     sacubitril-valsartan (Entresto) 24-26 MG TABS, Take 1 tablet by mouth 2 (two) times a day, Disp: , Rfl:     testosterone (ANDROGEL) 25 MG/2.5GM (1%) GEL, Place 25 mg on the skin daily, Disp: , Rfl:     VITAMIN D PO, Take 1 capsule by mouth in the morning, Disp: , Rfl:     indomethacin (INDOCIN) 50 mg capsule, Take 50 mg by mouth  "if needed (takes for gout pain as needed), Disp: , Rfl:     Social History     Socioeconomic History    Marital status: /Civil Union     Spouse name: Not on file    Number of children: Not on file    Years of education: Not on file    Highest education level: Not on file   Occupational History    Not on file   Tobacco Use    Smoking status: Never    Smokeless tobacco: Never    Tobacco comments:     No smoking in the home   Vaping Use    Vaping status: Never Used   Substance and Sexual Activity    Alcohol use: Not Currently     Comment: 1 drink a day. varies beer wine or liqour 6/12    Drug use: Never    Sexual activity: Not on file   Other Topics Concern    Not on file   Social History Narrative    Not on file     Social Determinants of Health     Financial Resource Strain: Not on file   Food Insecurity: Not on file   Transportation Needs: Not on file   Physical Activity: Not on file   Stress: Not on file   Social Connections: Not on file   Intimate Partner Violence: Not on file   Housing Stability: Not on file       No family history on file.    Physical Exam:    Vitals: Blood pressure 118/62, pulse 72, height 6' 2\" (1.88 m), weight 102 kg (225 lb), SpO2 97%., Body mass index is 28.89 kg/m².,   Wt Readings from Last 3 Encounters:   09/10/24 102 kg (225 lb)   08/22/24 104 kg (228 lb 13.4 oz)   08/21/24 103 kg (227 lb 1.2 oz)       Physical Exam  Constitutional:       General: He is not in acute distress.     Appearance: Normal appearance.   HENT:      Head: Normocephalic and atraumatic.      Mouth/Throat:      Mouth: Mucous membranes are moist.   Eyes:      General: No scleral icterus.     Extraocular Movements: Extraocular movements intact.   Neck:      Vascular: No JVD.   Cardiovascular:      Rate and Rhythm: Normal rate and regular rhythm.      Pulses: Normal pulses.      Heart sounds: S1 normal and S2 normal. No murmur heard.     No friction rub. No gallop.   Pulmonary:      Breath sounds: Normal breath " "sounds.   Abdominal:      General: There is no distension.      Palpations: Abdomen is soft.      Tenderness: There is no abdominal tenderness. There is no guarding or rebound.   Musculoskeletal:         General: Normal range of motion.      Cervical back: Neck supple.      Right lower leg: No edema.      Left lower leg: No edema.   Skin:     General: Skin is warm and dry.      Capillary Refill: Capillary refill takes less than 2 seconds.   Neurological:      General: No focal deficit present.      Mental Status: He is alert and oriented to person, place, and time.   Psychiatric:         Mood and Affect: Mood normal.       Labs & Results:    Lab Results   Component Value Date    WBC 6.71 08/22/2024    HGB 16.1 08/22/2024    HCT 49.2 08/22/2024    MCV 88 08/22/2024     08/22/2024     Lab Results   Component Value Date    SODIUM 143 08/22/2024    K 3.7 08/22/2024     (H) 08/22/2024    CO2 26 08/22/2024    BUN 18 08/22/2024    CREATININE 1.10 08/22/2024    GLUC 99 08/22/2024    CALCIUM 8.7 08/22/2024     No results found for: \"NTBNP\"   Lab Results   Component Value Date    CHOLESTEROL 109 08/22/2024    CHOLESTEROL 153 06/03/2024    CHOLESTEROL 197 11/27/2023     Lab Results   Component Value Date    HDL 36 (L) 08/22/2024    HDL 51 06/03/2024    HDL 56 11/27/2023     Lab Results   Component Value Date    TRIG 63 08/22/2024    TRIG 72 06/03/2024    TRIG 114 11/27/2023     Lab Results   Component Value Date    NONHDLC 102 06/03/2024    NONHDLC 141 11/27/2023    NONHDLC 107 12/14/2022       EKG personally reviewed by Ursula Palma MD.     Counseling / Coordination of Care  I have spent a total time of 40 minutes in caring for this patient on the day of the visit/encounter including Diagnostic results, Instructions for management, Patient and family education, Impressions, Counseling / Coordination of care, Documenting in the medical record, Reviewing / ordering tests, medicine, procedures  , Obtaining or " reviewing history  , and Communicating with other healthcare professionals .      Thank you for the opportunity to participate in the care of this patient.    TEQUILA HURST MD  ADVANCED HEART FAILURE AND MECHANICAL CIRCULATORY SUPPORT  [facility]

## 2024-09-10 ENCOUNTER — OFFICE VISIT (OUTPATIENT)
Dept: CARDIOLOGY CLINIC | Facility: CLINIC | Age: 74
End: 2024-09-10
Payer: COMMERCIAL

## 2024-09-10 VITALS
WEIGHT: 225 LBS | BODY MASS INDEX: 28.88 KG/M2 | HEIGHT: 74 IN | HEART RATE: 72 BPM | SYSTOLIC BLOOD PRESSURE: 118 MMHG | OXYGEN SATURATION: 97 % | DIASTOLIC BLOOD PRESSURE: 62 MMHG

## 2024-09-10 DIAGNOSIS — I25.10 CORONARY ARTERY DISEASE INVOLVING NATIVE CORONARY ARTERY OF NATIVE HEART WITHOUT ANGINA PECTORIS: ICD-10-CM

## 2024-09-10 DIAGNOSIS — I50.20 HFREF (HEART FAILURE WITH REDUCED EJECTION FRACTION) (HCC): Primary | ICD-10-CM

## 2024-09-10 DIAGNOSIS — I44.7 LBBB (LEFT BUNDLE BRANCH BLOCK): ICD-10-CM

## 2024-09-10 DIAGNOSIS — I48.91 ATRIAL FIBRILLATION, UNSPECIFIED TYPE (HCC): ICD-10-CM

## 2024-09-10 PROCEDURE — G2211 COMPLEX E/M VISIT ADD ON: HCPCS | Performed by: INTERNAL MEDICINE

## 2024-09-10 PROCEDURE — 99215 OFFICE O/P EST HI 40 MIN: CPT | Performed by: INTERNAL MEDICINE

## 2024-09-10 RX ORDER — SPIRONOLACTONE 25 MG/1
12.5 TABLET ORAL DAILY
Qty: 30 TABLET | Refills: 5 | Status: SHIPPED | OUTPATIENT
Start: 2024-09-10

## 2024-09-10 RX ORDER — LORAZEPAM 0.5 MG/1
0.5 TABLET ORAL 2 TIMES DAILY
COMMUNITY
Start: 2024-08-12

## 2024-09-10 NOTE — PATIENT INSTRUCTIONS
Start spironolactone 12.5mg daily  Obtain BMP in 1 week  Home monitoring of blood pressure  Obtain cardiac MRI as scheduled  Follow up with Dr. Mcfarland as scheduled

## 2024-09-16 ENCOUNTER — APPOINTMENT (OUTPATIENT)
Dept: LAB | Facility: CLINIC | Age: 74
End: 2024-09-16
Payer: COMMERCIAL

## 2024-09-16 DIAGNOSIS — I50.20 HFREF (HEART FAILURE WITH REDUCED EJECTION FRACTION) (HCC): ICD-10-CM

## 2024-09-16 LAB
ANION GAP SERPL CALCULATED.3IONS-SCNC: 9 MMOL/L (ref 4–13)
BUN SERPL-MCNC: 19 MG/DL (ref 5–25)
CALCIUM SERPL-MCNC: 9.7 MG/DL (ref 8.4–10.2)
CHLORIDE SERPL-SCNC: 103 MMOL/L (ref 96–108)
CO2 SERPL-SCNC: 29 MMOL/L (ref 21–32)
CREAT SERPL-MCNC: 1.12 MG/DL (ref 0.6–1.3)
GFR SERPL CREATININE-BSD FRML MDRD: 63 ML/MIN/1.73SQ M
GLUCOSE SERPL-MCNC: 101 MG/DL (ref 65–140)
POTASSIUM SERPL-SCNC: 4.1 MMOL/L (ref 3.5–5.3)
SODIUM SERPL-SCNC: 141 MMOL/L (ref 135–147)

## 2024-09-16 PROCEDURE — 36415 COLL VENOUS BLD VENIPUNCTURE: CPT

## 2024-09-16 PROCEDURE — 80048 BASIC METABOLIC PNL TOTAL CA: CPT

## 2024-09-17 ENCOUNTER — HOSPITAL ENCOUNTER (OUTPATIENT)
Dept: MRI IMAGING | Facility: HOSPITAL | Age: 74
Discharge: HOME | End: 2024-09-17
Payer: COMMERCIAL

## 2024-09-17 DIAGNOSIS — I25.10 CORONARY ARTERY DISEASE INVOLVING NATIVE CORONARY ARTERY OF NATIVE HEART WITHOUT ANGINA PECTORIS: ICD-10-CM

## 2024-09-17 DIAGNOSIS — I50.22 CHRONIC SYSTOLIC HEART FAILURE (HCC): ICD-10-CM

## 2024-09-17 PROCEDURE — A9585 GADOBUTROL INJECTION: HCPCS | Performed by: INTERNAL MEDICINE

## 2024-09-17 PROCEDURE — 75561 CARDIAC MRI FOR MORPH W/DYE: CPT

## 2024-09-17 RX ORDER — GADOBUTROL 604.72 MG/ML
20 INJECTION INTRAVENOUS
Status: COMPLETED | OUTPATIENT
Start: 2024-09-17 | End: 2024-09-17

## 2024-09-17 RX ADMIN — GADOBUTROL 20 ML: 604.72 INJECTION INTRAVENOUS at 12:20

## 2024-10-10 ENCOUNTER — TELEPHONE (OUTPATIENT)
Dept: CARDIOLOGY CLINIC | Facility: CLINIC | Age: 74
End: 2024-10-10

## 2024-10-10 DIAGNOSIS — I48.91 ATRIAL FIBRILLATION, UNSPECIFIED TYPE (HCC): Primary | ICD-10-CM

## 2024-10-18 ENCOUNTER — HOSPITAL ENCOUNTER (OUTPATIENT)
Dept: NON INVASIVE DIAGNOSTICS | Facility: HOSPITAL | Age: 74
Discharge: HOME | End: 2024-10-18
Attending: INTERNAL MEDICINE
Payer: COMMERCIAL

## 2024-10-18 VITALS
DIASTOLIC BLOOD PRESSURE: 62 MMHG | BODY MASS INDEX: 28.88 KG/M2 | SYSTOLIC BLOOD PRESSURE: 118 MMHG | WEIGHT: 225 LBS | HEART RATE: 90 BPM | HEIGHT: 74 IN

## 2024-10-18 DIAGNOSIS — I10 HYPERTENSION, UNSPECIFIED TYPE: ICD-10-CM

## 2024-10-18 DIAGNOSIS — I50.20 HFREF (HEART FAILURE WITH REDUCED EJECTION FRACTION) (HCC): ICD-10-CM

## 2024-10-18 LAB
AORTIC ROOT: 3.5 CM
AORTIC VALVE MEAN VELOCITY: 16.2 M/S
APICAL FOUR CHAMBER EJECTION FRACTION: 48 %
AV AREA BY CONTINUOUS VTI: 2.4 CM2
AV AREA PEAK VELOCITY: 2.4 CM2
AV LVOT MEAN GRADIENT: 2 MMHG
AV LVOT PEAK GRADIENT: 4 MMHG
AV MEAN GRADIENT: 12 MMHG
AV PEAK GRADIENT: 20 MMHG
AV VALVE AREA: 2.41 CM2
AV VELOCITY RATIO: 0.45
BSA FOR ECHO PROCEDURE: 2.28 M2
DOP CALC AO PEAK VEL: 2.26 M/S
DOP CALC AO VTI: 50.04 CM
DOP CALC LVOT AREA: 5.31 CM2
DOP CALC LVOT CARDIAC INDEX: 2.99 L/MIN/M2
DOP CALC LVOT CARDIAC OUTPUT: 6.84 L/MIN
DOP CALC LVOT DIAMETER: 2.6 CM
DOP CALC LVOT PEAK VEL VTI: 22.74 CM
DOP CALC LVOT PEAK VEL: 1.02 M/S
DOP CALC LVOT STROKE INDEX: 51.5 ML/M2
DOP CALC LVOT STROKE VOLUME: 120.67
E WAVE DECELERATION TIME: 328 MS
E/A RATIO: 0.63
FRACTIONAL SHORTENING: 26 (ref 28–44)
INTERVENTRICULAR SEPTUM IN DIASTOLE (PARASTERNAL SHORT AXIS VIEW): 1.4 CM
INTERVENTRICULAR SEPTUM: 1.4 CM (ref 0.6–1.1)
IVC: 11 MM
LAAS-AP2: 17.3 CM2
LAAS-AP4: 17.9 CM2
LEFT ATRIUM SIZE: 4.2 CM
LEFT ATRIUM VOLUME (MOD BIPLANE): 50 ML
LEFT ATRIUM VOLUME INDEX (MOD BIPLANE): 21.8 ML/M2
LEFT INTERNAL DIMENSION IN SYSTOLE: 3.5 CM (ref 2.1–4)
LEFT VENTRICULAR INTERNAL DIMENSION IN DIASTOLE: 4.7 CM (ref 3.5–6)
LEFT VENTRICULAR POSTERIOR WALL IN END DIASTOLE: 1.3 CM
LEFT VENTRICULAR STROKE VOLUME: 54 ML
LVSV (TEICH): 54 ML
MV E'TISSUE VEL-SEP: 5 CM/S
MV PEAK A VEL: 0.89 M/S
MV PEAK E VEL: 56 CM/S
MV STENOSIS PRESSURE HALF TIME: 95 MS
MV VALVE AREA P 1/2 METHOD: 2.32
RA PRESSURE ESTIMATED: 3 MMHG
RIGHT ATRIUM AREA SYSTOLE A4C: 11.5 CM2
RIGHT VENTRICLE ID DIMENSION: 3.4 CM
RV PSP: 35 MMHG
SL CV LEFT ATRIUM LENGTH A2C: 4.8 CM
SL CV LV EF: 40
SL CV PED ECHO LEFT VENTRICLE DIASTOLIC VOLUME (MOD BIPLANE) 2D: 103 ML
SL CV PED ECHO LEFT VENTRICLE SYSTOLIC VOLUME (MOD BIPLANE) 2D: 49 ML
TR MAX PG: 32 MMHG
TR PEAK VELOCITY: 2.8 M/S
TRICUSPID ANNULAR PLANE SYSTOLIC EXCURSION: 2.1 CM
TRICUSPID VALVE PEAK REGURGITATION VELOCITY: 2.81 M/S

## 2024-10-18 PROCEDURE — 93306 TTE W/DOPPLER COMPLETE: CPT

## 2024-10-18 PROCEDURE — 93306 TTE W/DOPPLER COMPLETE: CPT | Performed by: INTERNAL MEDICINE

## 2024-10-22 ENCOUNTER — TELEPHONE (OUTPATIENT)
Dept: CARDIOLOGY CLINIC | Facility: CLINIC | Age: 74
End: 2024-10-22

## 2024-10-22 ENCOUNTER — PREP FOR PROCEDURE (OUTPATIENT)
Dept: CARDIOLOGY CLINIC | Facility: CLINIC | Age: 74
End: 2024-10-22

## 2024-10-22 DIAGNOSIS — I50.20 HFREF (HEART FAILURE WITH REDUCED EJECTION FRACTION) (HCC): Primary | ICD-10-CM

## 2024-10-22 NOTE — TELEPHONE ENCOUNTER
"I called patient and informed on Eliquis hold.     Eliquis - Do not take day before and morning of procedure.     Thanks,  Claritza \"Nicole\" Louie     "

## 2024-10-22 NOTE — TELEPHONE ENCOUNTER
"----- Message from Claritza PEPPER sent at 10/21/2024  5:10 PM EDT -----  : advise on Eliquis hold for Pacer.     : please enter prep for procedure for stent.     Thanks,  Claritza \"Nicole\" Louie  ----- Message -----  From: Artur Mcfarland MD  Sent: 10/21/2024   5:01 PM EDT  To: Ursula Palma MD; #    I called patient.     Please schedule BIV Pacemaker medtronic.  I discussed with him.  Plan will be stent after pacemaker (not same day, a couple of weeks apart is good).  ----- Message -----  From: Ursula Palma MD  Sent: 10/21/2024  12:54 PM EDT  To: Artru Mcfarland MD    Agree with CRT. Do you want to proceed with CRT first then do the LAD stent?  "

## 2024-10-22 NOTE — TELEPHONE ENCOUNTER
"Patient scheduled for BIV PACEMAKER device on 11/8/24 at [facility] with Dr. Mcfarland.      Instructions sent to patient through [email or web address].      Patient aware of all general instructions.    Medication holds:   Spironolactone - Do not take morning of procedure.    Jardiance - Do not take for 4 days before and morning of procedure.    Entresto - Do not take night before and morning of procedure. (two (2) doses)      Blood Thinners:  Eliquis - Do not take day before and morning of procedure per .    Labs to be done on 10/25/24:  CMP / CBC (FASTING 8 HOURS)      : Please advise on Eliquis hold.     Thank you,  Claritza \"Nicole\" Louie    "

## 2024-10-22 NOTE — TELEPHONE ENCOUNTER
"Patient's wife answered call and said to call back in one hour.     Thanks,  Claritza \"Nicole\" Louie    "

## 2024-10-23 ENCOUNTER — APPOINTMENT (OUTPATIENT)
Dept: LAB | Facility: CLINIC | Age: 74
End: 2024-10-23
Payer: COMMERCIAL

## 2024-10-23 LAB
ALBUMIN SERPL BCG-MCNC: 3.9 G/DL (ref 3.5–5)
ALP SERPL-CCNC: 78 U/L (ref 34–104)
ALT SERPL W P-5'-P-CCNC: 47 U/L (ref 7–52)
ANION GAP SERPL CALCULATED.3IONS-SCNC: 9 MMOL/L (ref 4–13)
AST SERPL W P-5'-P-CCNC: 36 U/L (ref 13–39)
BASOPHILS # BLD AUTO: 0.05 THOUSANDS/ΜL (ref 0–0.1)
BASOPHILS NFR BLD AUTO: 1 % (ref 0–1)
BILIRUB SERPL-MCNC: 0.72 MG/DL (ref 0.2–1)
BUN SERPL-MCNC: 20 MG/DL (ref 5–25)
CALCIUM SERPL-MCNC: 9.1 MG/DL (ref 8.4–10.2)
CHLORIDE SERPL-SCNC: 105 MMOL/L (ref 96–108)
CO2 SERPL-SCNC: 29 MMOL/L (ref 21–32)
CREAT SERPL-MCNC: 1.09 MG/DL (ref 0.6–1.3)
EOSINOPHIL # BLD AUTO: 0.61 THOUSAND/ΜL (ref 0–0.61)
EOSINOPHIL NFR BLD AUTO: 7 % (ref 0–6)
ERYTHROCYTE [DISTWIDTH] IN BLOOD BY AUTOMATED COUNT: 14.7 % (ref 11.6–15.1)
GFR SERPL CREATININE-BSD FRML MDRD: 65 ML/MIN/1.73SQ M
GLUCOSE P FAST SERPL-MCNC: 103 MG/DL (ref 65–99)
HCT VFR BLD AUTO: 59 % (ref 36.5–49.3)
HGB BLD-MCNC: 18.8 G/DL (ref 12–17)
IMM GRANULOCYTES # BLD AUTO: 0.02 THOUSAND/UL (ref 0–0.2)
IMM GRANULOCYTES NFR BLD AUTO: 0 % (ref 0–2)
LYMPHOCYTES # BLD AUTO: 2.17 THOUSANDS/ΜL (ref 0.6–4.47)
LYMPHOCYTES NFR BLD AUTO: 25 % (ref 14–44)
MCH RBC QN AUTO: 27.3 PG (ref 26.8–34.3)
MCHC RBC AUTO-ENTMCNC: 31.9 G/DL (ref 31.4–37.4)
MCV RBC AUTO: 86 FL (ref 82–98)
MONOCYTES # BLD AUTO: 0.57 THOUSAND/ΜL (ref 0.17–1.22)
MONOCYTES NFR BLD AUTO: 7 % (ref 4–12)
NEUTROPHILS # BLD AUTO: 5.3 THOUSANDS/ΜL (ref 1.85–7.62)
NEUTS SEG NFR BLD AUTO: 60 % (ref 43–75)
NRBC BLD AUTO-RTO: 0 /100 WBCS
PLATELET # BLD AUTO: 235 THOUSANDS/UL (ref 149–390)
PMV BLD AUTO: 10.9 FL (ref 8.9–12.7)
POTASSIUM SERPL-SCNC: 4.4 MMOL/L (ref 3.5–5.3)
PROT SERPL-MCNC: 7.2 G/DL (ref 6.4–8.4)
RBC # BLD AUTO: 6.89 MILLION/UL (ref 3.88–5.62)
SODIUM SERPL-SCNC: 143 MMOL/L (ref 135–147)
WBC # BLD AUTO: 8.72 THOUSAND/UL (ref 4.31–10.16)

## 2024-10-23 PROCEDURE — 80053 COMPREHEN METABOLIC PANEL: CPT

## 2024-10-23 PROCEDURE — 85025 COMPLETE CBC W/AUTO DIFF WBC: CPT

## 2024-10-23 PROCEDURE — 36415 COLL VENOUS BLD VENIPUNCTURE: CPT

## 2024-10-24 NOTE — TELEPHONE ENCOUNTER
"Indio haywood,  I see you are wkng on this pt, Dr Kateryna Palma placed a prep for procedure for a PCI to be schedule after his device implant on 11/8/24.    \"Per Dr DOTY:   Please schedule BIV Pacemaker medtronic.  I discussed with him.  Plan will be stent after pacemaker (not same day, a couple of weeks apart is good).\"      "

## 2024-10-25 ENCOUNTER — TELEPHONE (OUTPATIENT)
Dept: CARDIOLOGY CLINIC | Facility: CLINIC | Age: 74
End: 2024-10-25

## 2024-10-25 DIAGNOSIS — I25.10 CORONARY ARTERY DISEASE INVOLVING NATIVE CORONARY ARTERY OF NATIVE HEART WITHOUT ANGINA PECTORIS: Primary | ICD-10-CM

## 2024-10-25 NOTE — TELEPHONE ENCOUNTER
"----- Message from Claritza PEPPER sent at 10/21/2024  5:10 PM EDT -----  : advise on Eliquis hold for Pacer.      : please enter prep for procedure for stent.      Thanks,  Claritza \"Nicole\" Louie    ----- Message -----  From: Artur Mcfarland MD  Sent: 10/21/2024   5:01 PM EDT  To: Ursula Palma MD; #     I called patient.      Please schedule BIV Pacemaker medtronic.  I discussed with him.  Plan will be stent after pacemaker (not same day, a couple of weeks apart is good).    ----- Message -----  From: Ursula Palma MD  Sent: 10/21/2024  12:54 PM EDT  To: Artur Mcfarland MD     Agree with CRT. Do you want to proceed with CRT first then do the LAD stent?     "

## 2024-10-25 NOTE — TELEPHONE ENCOUNTER
"Patient scheduled for PCI on 12/2/24 at [facility] with Dr. Dawn.      Instructions sent to patient through [employer].      Patient aware of all general instructions.    Medication holds:   Spironolactone - Do not take morning of procedure.    Jardiance - Do not take for 4 days before and morning of procedure.    Blood Thinners:   Eliquis - Do not take the night before and morning of procedure.     Labs to be done on 11/18/24:  CMP / CBC / BMP (fasting 8 hours)       Thank you,  Claritza \"Nicole\" Louie      "

## 2024-10-31 NOTE — TELEPHONE ENCOUNTER
"NEEDS P2P FOR PROCEDURE.     ---- Message -----  From: Claritza Palma  Sent: 10/31/2024  10:36 AM EDT  To: Artur Mcfarland MD; Clarissa Bright; *  Subject: RE: P2P REQ FOR BIV PACER IMPLANT                  Please see message below needing to do a P2P by 11/1/24.     Patient is scheduled for BIV PM on 11/8/24 with .     Thanks,  Claritza \"Nicole\" Louie  ----- Message -----  From: Clarissa Bright  Sent: 10/30/2024  11:28 AM EDT  To: Artur Mcfarland MD; *  Subject: P2P REQ FOR BIV PACER IMPLANT                    Good morning Dr. Mcfarland,    Patient's BIV Pacer Implant/ [id number] requires P2P by [facility]. Please call [phone], select Option # 1, and enter in Reference Number: [id number] and [id number]. These need to be scheduled and a response is needed by 11/1/24. Please let me know how you would like to proceed.    "

## 2024-11-01 DIAGNOSIS — I50.20 HFREF (HEART FAILURE WITH REDUCED EJECTION FRACTION) (HCC): ICD-10-CM

## 2024-11-01 RX ORDER — SPIRONOLACTONE 25 MG/1
12.5 TABLET ORAL DAILY
Qty: 45 TABLET | Refills: 4 | Status: SHIPPED | OUTPATIENT
Start: 2024-11-01

## 2024-11-01 NOTE — TELEPHONE ENCOUNTER
Caller: Tsering     Doctor: Bartolo     Reason for call: Patient wife calling in regards to patients upcoming pacemaker procedure. She states he is scheduled for his procedure on November 8, but also has a follow up scheduled on November 7. She is wondering if he should keep the appointment on November 7 or schedule for further out.     Patient wife is also wondering if the patient should continue taking the spironolactone as prescribed in the chart. She states patient is due for a refill, but does not want to pick that up if he is not supposed to continue taking.     Call back#: [phone]

## 2024-11-01 NOTE — TELEPHONE ENCOUNTER
"Will keep procedure as BIV PM as it's approved.    Thanks,  Claritza \"Nicole\" Louie            "

## 2024-11-01 NOTE — TELEPHONE ENCOUNTER
Reason for call: Patient wife calling in regards to patients upcoming pacemaker procedure. She states he is scheduled for his procedure on November 8, but also has a follow up scheduled on November 7. She is wondering if he should keep the appointment on November 7 or schedule for further out.   Patient wife is also wondering if the patient should continue taking the spironolactone as prescribed in the chart. She states patient is due for a refill, but does not want to pick that up if he is not supposed to continue taking. Please advise.   Wondering if we should fill order or not.

## 2024-11-04 ENCOUNTER — TELEPHONE (OUTPATIENT)
Age: 74
End: 2024-11-04

## 2024-11-04 ENCOUNTER — TELEPHONE (OUTPATIENT)
Dept: CARDIOLOGY CLINIC | Facility: CLINIC | Age: 74
End: 2024-11-04

## 2024-11-04 NOTE — TELEPHONE ENCOUNTER
Patient wife calling in regards to patients upcoming pacemaker procedure. She states he is scheduled for his procedure on November 8, but also has a follow up scheduled on November 7. She is wondering if he should keep the appointment on November 7 or schedule for further out.      Patient wife is also wondering if the patient should continue taking the spironolactone as prescribed in the chart. She states patient is due for a refill, but does not want to pick that up if he is not supposed to continue taking.      Call back#: [phone]

## 2024-11-04 NOTE — TELEPHONE ENCOUNTER
Spoke with patient, instructed to continue spironolactone and that we will cx appointment for 11/7/24.

## 2024-11-08 ENCOUNTER — HOSPITAL ENCOUNTER (OUTPATIENT)
Facility: HOSPITAL | Age: 74
Setting detail: OUTPATIENT SURGERY
Discharge: HOME | End: 2024-11-08
Attending: INTERNAL MEDICINE | Admitting: INTERNAL MEDICINE
Payer: COMMERCIAL

## 2024-11-08 ENCOUNTER — ANESTHESIA (OUTPATIENT)
Dept: NON INVASIVE DIAGNOSTICS | Facility: HOSPITAL | Age: 74
End: 2024-11-08
Payer: COMMERCIAL

## 2024-11-08 ENCOUNTER — ANESTHESIA EVENT (OUTPATIENT)
Dept: NON INVASIVE DIAGNOSTICS | Facility: HOSPITAL | Age: 74
End: 2024-11-08
Payer: COMMERCIAL

## 2024-11-08 ENCOUNTER — APPOINTMENT (OUTPATIENT)
Dept: RADIOLOGY | Facility: HOSPITAL | Age: 74
End: 2024-11-08
Payer: COMMERCIAL

## 2024-11-08 VITALS
WEIGHT: 220 LBS | HEART RATE: 60 BPM | HEIGHT: 74 IN | SYSTOLIC BLOOD PRESSURE: 110 MMHG | RESPIRATION RATE: 16 BRPM | OXYGEN SATURATION: 94 % | DIASTOLIC BLOOD PRESSURE: 68 MMHG | BODY MASS INDEX: 28.23 KG/M2 | TEMPERATURE: 97.4 F

## 2024-11-08 DIAGNOSIS — I50.20 HFREF (HEART FAILURE WITH REDUCED EJECTION FRACTION) (HCC): ICD-10-CM

## 2024-11-08 DIAGNOSIS — Z95.0 STATUS POST PLACEMENT OF CARDIAC PACEMAKER: ICD-10-CM

## 2024-11-08 DIAGNOSIS — I25.119 CORONARY ARTERY DISEASE INVOLVING NATIVE CORONARY ARTERY OF NATIVE HEART WITH ANGINA PECTORIS (HCC): ICD-10-CM

## 2024-11-08 DIAGNOSIS — I42.9 CARDIOMYOPATHY, UNSPECIFIED TYPE (HCC): Primary | ICD-10-CM

## 2024-11-08 PROBLEM — I42.0 DILATED CARDIOMYOPATHY (HCC): Status: ACTIVE | Noted: 2024-11-08

## 2024-11-08 PROBLEM — I42.8 NONISCHEMIC CARDIOMYOPATHY (HCC): Status: ACTIVE | Noted: 2024-11-08

## 2024-11-08 LAB
ANION GAP SERPL CALCULATED.3IONS-SCNC: 8 MMOL/L (ref 4–13)
ATRIAL RATE: 71 BPM
BUN SERPL-MCNC: 21 MG/DL (ref 5–25)
CALCIUM SERPL-MCNC: 8.9 MG/DL (ref 8.4–10.2)
CHLORIDE SERPL-SCNC: 104 MMOL/L (ref 96–108)
CO2 SERPL-SCNC: 30 MMOL/L (ref 21–32)
CREAT SERPL-MCNC: 1 MG/DL (ref 0.6–1.3)
ERYTHROCYTE [DISTWIDTH] IN BLOOD BY AUTOMATED COUNT: 15.9 % (ref 11.6–15.1)
GFR SERPL CREATININE-BSD FRML MDRD: 72 ML/MIN/1.73SQ M
GLUCOSE P FAST SERPL-MCNC: 104 MG/DL (ref 65–99)
GLUCOSE SERPL-MCNC: 104 MG/DL (ref 65–140)
HCT VFR BLD AUTO: 54.8 % (ref 36.5–49.3)
HGB BLD-MCNC: 17.7 G/DL (ref 12–17)
INR PPP: 0.97 (ref 0.85–1.19)
MCH RBC QN AUTO: 27.2 PG (ref 26.8–34.3)
MCHC RBC AUTO-ENTMCNC: 32.3 G/DL (ref 31.4–37.4)
MCV RBC AUTO: 84 FL (ref 82–98)
P AXIS: 65 DEGREES
PLATELET # BLD AUTO: 197 THOUSANDS/UL (ref 149–390)
PMV BLD AUTO: 10.5 FL (ref 8.9–12.7)
POTASSIUM SERPL-SCNC: 3.3 MMOL/L (ref 3.5–5.3)
PR INTERVAL: 164 MS
PROTHROMBIN TIME: 13.2 SECONDS (ref 12.3–15)
QRS AXIS: -22 DEGREES
QRSD INTERVAL: 172 MS
QT INTERVAL: 440 MS
QTC INTERVAL: 479 MS
RBC # BLD AUTO: 6.51 MILLION/UL (ref 3.88–5.62)
SODIUM SERPL-SCNC: 142 MMOL/L (ref 135–147)
T WAVE AXIS: 140 DEGREES
VENTRICULAR RATE: 71 BPM
WBC # BLD AUTO: 7.59 THOUSAND/UL (ref 4.31–10.16)

## 2024-11-08 PROCEDURE — NC001 PR NO CHARGE: Performed by: PHYSICIAN ASSISTANT

## 2024-11-08 PROCEDURE — C1769 GUIDE WIRE: HCPCS | Performed by: INTERNAL MEDICINE

## 2024-11-08 PROCEDURE — 85610 PROTHROMBIN TIME: CPT | Performed by: PHYSICIAN ASSISTANT

## 2024-11-08 PROCEDURE — 93010 ELECTROCARDIOGRAM REPORT: CPT | Performed by: INTERNAL MEDICINE

## 2024-11-08 PROCEDURE — C1892 INTRO/SHEATH,FIXED,PEEL-AWAY: HCPCS | Performed by: INTERNAL MEDICINE

## 2024-11-08 PROCEDURE — C1898 LEAD, PMKR, OTHER THAN TRANS: HCPCS | Performed by: INTERNAL MEDICINE

## 2024-11-08 PROCEDURE — 80048 BASIC METABOLIC PNL TOTAL CA: CPT | Performed by: PHYSICIAN ASSISTANT

## 2024-11-08 PROCEDURE — C1785 PMKR, DUAL, RATE-RESP: HCPCS | Performed by: INTERNAL MEDICINE

## 2024-11-08 PROCEDURE — C1887 CATHETER, GUIDING: HCPCS | Performed by: INTERNAL MEDICINE

## 2024-11-08 PROCEDURE — 33208 INSRT HEART PM ATRIAL & VENT: CPT | Performed by: INTERNAL MEDICINE

## 2024-11-08 PROCEDURE — 85027 COMPLETE CBC AUTOMATED: CPT | Performed by: PHYSICIAN ASSISTANT

## 2024-11-08 PROCEDURE — 93005 ELECTROCARDIOGRAM TRACING: CPT

## 2024-11-08 PROCEDURE — 71045 X-RAY EXAM CHEST 1 VIEW: CPT

## 2024-11-08 DEVICE — IPG W1DR01 AZURE XT DR MRI USA
Type: IMPLANTABLE DEVICE | Site: CHEST  WALL | Status: FUNCTIONAL
Brand: AZURE™ XT DR MRI SURESCAN™

## 2024-11-08 DEVICE — LEAD PACER CAPSURE SENSE 53CM: Type: IMPLANTABLE DEVICE | Site: HEART | Status: FUNCTIONAL

## 2024-11-08 DEVICE — ENVELOPE CMRM6122 ABSORB MED MR
Type: IMPLANTABLE DEVICE | Site: CHEST  WALL | Status: FUNCTIONAL
Brand: TYRX™

## 2024-11-08 RX ORDER — CEFAZOLIN SODIUM 1 G/3ML
INJECTION, POWDER, FOR SOLUTION INTRAMUSCULAR; INTRAVENOUS AS NEEDED
Status: DISCONTINUED | OUTPATIENT
Start: 2024-11-08 | End: 2024-11-08

## 2024-11-08 RX ORDER — FENTANYL CITRATE 50 UG/ML
INJECTION, SOLUTION INTRAMUSCULAR; INTRAVENOUS AS NEEDED
Status: DISCONTINUED | OUTPATIENT
Start: 2024-11-08 | End: 2024-11-08

## 2024-11-08 RX ORDER — PROPOFOL 10 MG/ML
INJECTION, EMULSION INTRAVENOUS AS NEEDED
Status: DISCONTINUED | OUTPATIENT
Start: 2024-11-08 | End: 2024-11-08

## 2024-11-08 RX ORDER — CEFAZOLIN SODIUM 2 G/50ML
2000 SOLUTION INTRAVENOUS ONCE
Status: DISCONTINUED | OUTPATIENT
Start: 2024-11-08 | End: 2024-11-08 | Stop reason: HOSPADM

## 2024-11-08 RX ORDER — PROPOFOL 10 MG/ML
INJECTION, EMULSION INTRAVENOUS CONTINUOUS PRN
Status: DISCONTINUED | OUTPATIENT
Start: 2024-11-08 | End: 2024-11-08

## 2024-11-08 RX ORDER — LIDOCAINE HYDROCHLORIDE 10 MG/ML
INJECTION, SOLUTION EPIDURAL; INFILTRATION; INTRACAUDAL; PERINEURAL CODE/TRAUMA/SEDATION MEDICATION
Status: DISCONTINUED | OUTPATIENT
Start: 2024-11-08 | End: 2024-11-08 | Stop reason: HOSPADM

## 2024-11-08 RX ORDER — SODIUM CHLORIDE 9 MG/ML
20 INJECTION, SOLUTION INTRAVENOUS CONTINUOUS
Status: DISCONTINUED | OUTPATIENT
Start: 2024-11-08 | End: 2024-11-08 | Stop reason: HOSPADM

## 2024-11-08 RX ORDER — ACETAMINOPHEN 325 MG/1
650 TABLET ORAL EVERY 4 HOURS PRN
Status: DISCONTINUED | OUTPATIENT
Start: 2024-11-08 | End: 2024-11-08 | Stop reason: HOSPADM

## 2024-11-08 RX ORDER — OXYCODONE AND ACETAMINOPHEN 5; 325 MG/1; MG/1
1 TABLET ORAL EVERY 6 HOURS PRN
Qty: 10 TABLET | Refills: 0 | Status: SHIPPED | OUTPATIENT
Start: 2024-11-08 | End: 2024-11-18

## 2024-11-08 RX ORDER — GENTAMICIN 40 MG/ML
INJECTION, SOLUTION INTRAMUSCULAR; INTRAVENOUS CODE/TRAUMA/SEDATION MEDICATION
Status: DISCONTINUED | OUTPATIENT
Start: 2024-11-08 | End: 2024-11-08 | Stop reason: HOSPADM

## 2024-11-08 RX ORDER — PHENYLEPHRINE HCL IN 0.9% NACL 1 MG/10 ML
SYRINGE (ML) INTRAVENOUS AS NEEDED
Status: DISCONTINUED | OUTPATIENT
Start: 2024-11-08 | End: 2024-11-08

## 2024-11-08 RX ADMIN — FENTANYL CITRATE 50 MCG: 50 INJECTION INTRAMUSCULAR; INTRAVENOUS at 08:51

## 2024-11-08 RX ADMIN — PHENYLEPHRINE HYDROCHLORIDE 20 MCG/MIN: 10 INJECTION INTRAVENOUS at 08:24

## 2024-11-08 RX ADMIN — PROPOFOL 50 MG: 10 INJECTION, EMULSION INTRAVENOUS at 08:24

## 2024-11-08 RX ADMIN — CEFAZOLIN 2000 MG: 1 INJECTION, POWDER, FOR SOLUTION INTRAMUSCULAR; INTRAVENOUS at 08:29

## 2024-11-08 RX ADMIN — Medication 100 MCG: at 09:07

## 2024-11-08 RX ADMIN — SODIUM CHLORIDE 20 ML/HR: 0.9 INJECTION, SOLUTION INTRAVENOUS at 07:13

## 2024-11-08 RX ADMIN — PROPOFOL 70 MCG/KG/MIN: 10 INJECTION, EMULSION INTRAVENOUS at 08:24

## 2024-11-08 NOTE — H&P
H&P Exam - Cardiology   Amol Montes De Oca 74 y.o. 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% per echo 10/2024  7/2024 - per reports, had echo at outside facility showing LVEF 30-35%  Started on Toprol XL and Entresto, later started on Jardiance and spironolactone  Cardiac cath showed 80% proximal LAD disease, not intervened on  CTS evaluation for CABG recommended, not pursued  Established with heart failure service - cardiomyopathy felt to be out of proportion to level of CAD, possibly due to baseline left bundle branch block  9/2024 - cardiac MRI showed LVEF 39%, no prior infarct noted, all territories viable  10/2024 - repeat echo showed LVEF 40% despite optimal medical therapy  Biventricular pacemaker recommended  HFrEF (heart failure with reduced ejection fraction) ([facility])  Wt Readings from Last 3 Encounters:   11/08/24 99.8 kg (220 lb)   10/18/24 102 kg (225 lb)   09/10/24 102 kg (225 lb)   Chronic HFrEF, currently euvolemic - follows with heart failure team as an outpatient  Atrial fibrillation, unspecified type ([facility])  Unclear diagnosis of paroxysmal atrial fibrillation  7/2024 - EKG performed at routine PCP follow-up showed possible atrial fibrillation, started on Eliquis anticoagulation ([id number] = 2)  8/2024 -event monitor showed possible A-fib, regular rhythm however difficult to discern P waves --- anticoagulation continued  Will monitor via device interrogations following BiV pacemaker implantation  Hypertension  Currently controlled, maintained on Entresto, Toprol-XL, and spironolactone  Hyperlipidemia  Maintained on statin therapy        Plan:  Biventricular pacemaker implantation.    He will require several hours of bed rest in the post device setting, along with portable chest x-ray.  Anticipate discharge home later today if  no issues noted and x-ray reviewed.    All discharge instructions and restrictions were reviewed with the patient and his wife in detail, all questions answered.  Follow-up will be arranged.      History of Present Illness   HPI:  Amol Montes De Oca is a 74 y.o. year old male with mixed ischemic and nonischemic cardiomyopathy with LVEF 40% per most recent echo 10/2024, baseline left bundle branch block with wide QRS, chronic HFrEF, single-vessel CAD with 80% proximal LAD stenosis noted per cath 7/2024, hypertension, and hyperlipidemia.  In 7/2024 he presented for routine office visit with his PCP, at which time EKG showed possible atrial fibrillation.  He was started on Eliquis anticoagulation, and follow-up echocardiogram reportedly showed newly reduced LVEF 30-35%.  He was started on optimal medical therapy with Toprol-XL and Entresto, and later Jardiance and spironolactone were added to that regimen.  He was seen in EP consultation given his newly reduced EF and baseline the bundle branch block, ischemic workup was recommended with potential device placement if no improvement was noted.  He underwent cardiac catheterization which showed 80% proximal LAD disease which was not intervened on.  Potential CT surgery consultation was recommended for possible CABG, however this was not pursued.  He also wore an event monitor which showed NSVT and runs of SVT, however it was difficult to determine whether he truly had atrial fibrillation and this anticoagulation was continued.  He underwent a cardiac MRI 9/2024 which showed LVEF 39% without evidence of prior infarct and all areas viable.  Repeat echocardiogram 10/2024 after 3 months of optimal medical therapy still showed LVEF 40%.  Ultimately, biventricular pacemaker implantation was recommended as it was felt that his left bundle branch block was contributing to his cardiomyopathy.  He presents today to undergo that procedure.  No significant changes over the recent  past.      Review of Systems  ROS as noted above, otherwise 12 point review of systems was performed and is negative.       Historical Information   Past Medical History:   Diagnosis Date    COVID-19     1/2023 recoverd @ home    GERD (gastroesophageal reflux disease)     Gout     Hyperlipidemia     Hypertension     Serum cholesterol elevated      Past Surgical History:   Procedure Laterality Date    CARDIAC CATHETERIZATION Left 7/22/2024    Procedure: Cardiac catheterization;  Surgeon: Elizabeth Dawn DO;  Location: [facility];  Service: Cardiology    CARDIAC CATHETERIZATION N/A 7/22/2024    Procedure: Cardiac Coronary Angiogram;  Surgeon: Elizabeth Dawn DO;  Location: [facility];  Service: Cardiology    COLONOSCOPY      LIPOMA RESECTION Left     Left lower abdomen/groin area    SUBMANDIBULAR GLAND EXCISION Left 6/14/2023    Procedure: SUBMANDIBULAR GLAND EXICISON;  Surgeon: Andrew Hummel MD;  Location: [facility];  Service: ENT     Family History: No family history on file.    Social History   Social History     Substance and Sexual Activity   Alcohol Use Not Currently    Comment: 1 drink a day. varies beer wine or liqour 6/12     Social History     Substance and Sexual Activity   Drug Use Never     Social History     Tobacco Use   Smoking Status Never   Smokeless Tobacco Never   Tobacco Comments    No smoking in the home         Meds/Allergies   all medications and allergies reviewed  Home Medications:   Medications Prior to Admission:     atorvastatin (LIPITOR) 20 mg tablet    CALCIUM-VITAMIN D PO    indomethacin (INDOCIN) 50 mg capsule    loratadine (CLARITIN) 10 mg tablet    Multiple Vitamins-Minerals (multivitamin with minerals) tablet    sacubitril-valsartan (Entresto) 24-26 MG TABS    spironolactone (ALDACTONE) 25 mg tablet    VITAMIN D PO    acetaminophen (TYLENOL) 325 mg tablet    Eliquis 5 MG    Empagliflozin (JARDIANCE) 10 MG TABS tablet    LORazepam (ATIVAN) 0.5 mg  "tablet    meclizine (ANTIVERT) 12.5 MG tablet    metoprolol succinate (TOPROL-XL) 25 mg 24 hr tablet    testosterone (ANDROGEL) 25 MG/2.5GM (1%) GEL    No Known Allergies    Objective   Vitals: Blood pressure 148/74, pulse 76, temperature 98.2 °F (36.8 °C), temperature source Temporal, resp. rate 16, height 6' 2\" (1.88 m), weight 99.8 kg (220 lb), SpO2 96%.  Orthostatic Blood Pressures      Flowsheet Row Most Recent Value   Blood Pressure 148/74 filed at 11/08/2024 0715   Patient Position - Orthostatic VS Lying filed at 11/08/2024 0715            No intake or output data in the 24 hours ending 11/08/24 1001    Invasive Devices       Peripheral Intravenous Line  Duration             Peripheral IV 11/08/24 Left Antecubital <1 day                    Physical Exam  GEN: NAD, alert and oriented x 3, well appearing  SKIN: dry without significant lesions or rashes  HEENT: NCAT, PERRL, EOMs intact  NECK: No JVD appreciated  CARDIOVASCULAR: RRR  LUNGS: Good overall effort, no respiratory distress, no audible wheezing or rhonchi noted  ABDOMEN: Soft, nontender, nondistended  EXTREMITIES/VASCULAR: perfused without clubbing, cyanosis, or LE edema b/l  PSYCH: Normal mood and affect  NEURO: CN ll-Xll grossly intact      Lab Results: I have personally reviewed pertinent lab results.    Results from last 7 days   Lab Units 11/08/24  0711   WBC Thousand/uL 7.59   HEMOGLOBIN g/dL 17.7*   HEMATOCRIT % 54.8*   PLATELETS Thousands/uL 197     Results from last 7 days   Lab Units 11/08/24  0711   POTASSIUM mmol/L 3.3*   CHLORIDE mmol/L 104   CO2 mmol/L 30   BUN mg/dL 21   CREATININE mg/dL 1.00   CALCIUM mg/dL 8.9     Results from last 7 days   Lab Units 11/08/24  0711   INR  0.97             Imaging: Results Review Statement: No pertinent imaging studies reviewed.    ECHO: No results found for this or any previous visit.      Results for orders placed during the hospital encounter of 10/18/24    Echo complete w/ contrast if " indicated    Interpretation Summary    Left Ventricle: Left ventricular cavity size is normal. Wall thickness is mildly increased. There is mild to moderate concentric hypertrophy. The left ventricular ejection fraction is 40% by single dimension measurement. Systolic function is moderately reduced. There is moderate global hypokinesis. Diastolic function is normal for age.  Left atrial filling pressure is normal.    IVS: There is abnormal septal motion consistent with left bundle branch block.    Aortic Valve: The aortic valve is trileaflet. The leaflets are mildly thickened. The leaflets are mildly calcified. There is mildly reduced mobility. There is no evidence of regurgitation. There is mild stenosis. The aortic valve peak velocity is 2.26 m/s. The aortic valve mean gradient is 12 mmHg. The dimensionless velocity index is 0.45. The aortic valve area is 2.41 cm2. The stroke volume index is 51.50 ml/m2.    Mitral Valve: There is mild thickening. There is mild calcification. The leaflets exhibit normal mobility.  There is fibrocalcific disease of the mitral valve leaflets and subvalvular apparatus.    Tricuspid Valve: The right ventricular systolic pressure is normal. The estimated right ventricular systolic pressure is 35.00 mmHg.        EKG: pending        Code Status: Level 1 - Full Code

## 2024-11-08 NOTE — Clinical Note
EMERGENCY DEPARTMENT HISTORY AND PHYSICAL EXAM 
 
 
Date: 7/13/2018 Patient Name: Adilia Croft History of Presenting Illness Chief Complaint Patient presents with  Gout  
  hx of gout, swelling/redness/pain to RIGHT foot 5-6 days History Provided By: Patient HPI: Adilia Croft, 78 y.o. female with PMHx significant for gout, presents ambulatory with a significant limp to the ED with cc of 4 days of 8 out of 10 constant, aching right foot pain that is no better with OTC pain medications. She denies fever, malaise or injury. She has a history of ankle fracture; ORIF and septic arthritis of that ankle that required 6 weeks of IV antibiotics. Chief Complaint: right foot redness and swelling Duration: 4 Days Timing:  Acute Location: dorsum of right foot Quality: Aching Severity: 8 out of 10 Modifying Factors: weight bearing worsens Associated Symptoms: denies any other associated signs or symptoms There are no other complaints, changes, or physical findings at this time. PCP: Gareth Moyer MD 
 
Current Outpatient Prescriptions Medication Sig Dispense Refill  predniSONE (STERAPRED DS) 10 mg dose pack Per Dose Pack instructions 21 Tab 0  
 oxyCODONE-acetaminophen (PERCOCET) 5-325 mg per tablet Take 1 Tab by mouth every four (4) hours as needed for Pain. Max Daily Amount: 6 Tabs. 15 Tab 0  cephALEXin (KEFLEX) 500 mg capsule Take 1 Cap by mouth four (4) times daily for 7 days. 28 Cap 0  clopidogrel (PLAVIX) 75 mg tab TAKE 1 TABLET BY MOUTH EVERY DAY 90 Tab 0  MULTIVITAMIN PO Take  by mouth daily as needed.  diphenhydrAMINE (BENADRYL) 25 mg capsule Take 25 mg by mouth every six (6) hours as needed.  atorvastatin (LIPITOR) 20 mg tablet TAKE 1 TABLET BY MOUTH EVERY NIGHT AT BEDTIME 90 Tab 3  
 metoprolol tartrate (LOPRESSOR) 50 mg tablet TAKE 1 TABLET BY MOUTH TWICE DAILY 180 Tab 3  chlorthalidone (HYGROTEN) 25 mg tablet TAKE 1 TABLET BY MOUTH DAILY.  80 Screw extended under fluoro. Tab 3  amLODIPine-benazepril (LOTREL) 5-20 mg per capsule TAKE ONE CAPSULE BY MOUTH EVERY DAY 90 Cap 3  Aspirin, Buffered 81 mg tab Take  by mouth daily. Past History Past Medical History: 
Past Medical History:  
Diagnosis Date  CAD (coronary artery disease) 9/16/2009 EF=65-70%; Mild-mod MR; Dr Naida Cranker  Diabetes ([address] 9/16/2009  
 [place] line controlle by diet and exercise.  Hyperlipidemia 9/16/2009  Hypertension 9/16/2009 Past Surgical History: 
Past Surgical History:  
Procedure Laterality Date  CARDIAC SURG PROCEDURE UNLIST  2008  
 cardiac stent  HX ORTHOPAEDIC  10/2013  
 right ankle  HX ORTHOPAEDIC  2/20/14 INCISION AND DRAINAGE RIGHT ANKLE AND HARDWARE REMOVAL Family History: 
Family History Problem Relation Age of Onset  Hypertension Mother  Heart Disease Mother  Hypertension Father  Heart Disease Father Social History: 
Social History Substance Use Topics  Smoking status: Never Smoker  Smokeless tobacco: Never Used  Alcohol use No  
 
 
Allergies: Allergies Allergen Reactions  Crestor [Rosuvastatin] Unknown (comments) Review of Systems Review of Systems Constitutional: Negative for fatigue and fever. HENT: Negative for ear pain and sore throat. Eyes: Negative for pain, redness and visual disturbance. Respiratory: Negative for cough and shortness of breath. Cardiovascular: Negative for chest pain and palpitations. Gastrointestinal: Negative for abdominal pain, nausea and vomiting. Genitourinary: Negative for dysuria, frequency and urgency. Musculoskeletal: Negative for back pain, gait problem, neck pain and neck stiffness. Right foot pain Skin: Negative for rash and wound. Neurological: Negative for dizziness, weakness, light-headedness, numbness and headaches. Physical Exam  
Physical Exam  
Constitutional: She is oriented to person, place, and time.  She appears well-developed and well-nourished. Non-toxic appearance. No distress. HENT:  
Head: Normocephalic and atraumatic. Right Ear: External ear normal.  
Left Ear: External ear normal.  
Nose: Nose normal.  
Mouth/Throat: Uvula is midline. No trismus in the jaw. Eyes: Conjunctivae and EOM are normal. Pupils are equal, round, and reactive to light. No scleral icterus. Neck: Normal range of motion and full passive range of motion without pain. Cardiovascular: Normal rate and regular rhythm. Pulmonary/Chest: Effort normal. No accessory muscle usage. No tachypnea. No respiratory distress. She has no decreased breath sounds. She has no wheezes. Abdominal: Soft. There is no tenderness. Musculoskeletal: Normal range of motion. Neurological: She is alert and oriented to person, place, and time. She is not disoriented. No cranial nerve deficit. GCS eye subscore is 4. GCS verbal subscore is 5. GCS motor subscore is 6. Skin: Skin is intact. No rash noted. Psychiatric: She has a normal mood and affect. Her speech is normal.  
Nursing note and vitals reviewed. Diagnostic Study Results Labs - Recent Results (from the past 12 hour(s)) METABOLIC PANEL, BASIC Collection Time: 07/13/18  8:13 PM  
Result Value Ref Range Sodium 137 136 - 145 mmol/L Potassium 3.5 3.5 - 5.1 mmol/L Chloride 100 97 - 108 mmol/L  
 CO2 30 21 - 32 mmol/L Anion gap 7 5 - 15 mmol/L Glucose 80 65 - 100 mg/dL BUN 16 6 - 20 MG/DL Creatinine 0.88 0.55 - 1.02 MG/DL  
 BUN/Creatinine ratio 18 12 - 20 GFR est AA >60 >60 ml/min/1.73m2 GFR est non-AA >60 >60 ml/min/1.73m2 Calcium 9.1 8.5 - 10.1 MG/DL  
CBC WITH AUTOMATED DIFF Collection Time: 07/13/18  8:13 PM  
Result Value Ref Range WBC 10.9 3.6 - 11.0 K/uL  
 RBC 3.97 3.80 - 5.20 M/uL  
 HGB 12.2 11.5 - 16.0 g/dL HCT 36.7 35.0 - 47.0 %  MCV 92.4 80.0 - 99.0 FL  
 MCH 30.7 26.0 - 34.0 PG  
 MCHC 33.2 30.0 - 36.5 g/dL  
 RDW 13.2 11.5 - 14.5 % PLATELET 125 563 - 377 K/uL MPV 11.6 8.9 - 12.9 FL  
 NRBC 0.0 0  WBC ABSOLUTE NRBC 0.00 0.00 - 0.01 K/uL NEUTROPHILS 67 32 - 75 % LYMPHOCYTES 21 12 - 49 % MONOCYTES 9 5 - 13 % EOSINOPHILS 3 0 - 7 % BASOPHILS 0 0 - 1 % IMMATURE GRANULOCYTES 0 0.0 - 0.5 % ABS. NEUTROPHILS 7.3 1.8 - 8.0 K/UL  
 ABS. LYMPHOCYTES 2.2 0.8 - 3.5 K/UL  
 ABS. MONOCYTES 1.0 0.0 - 1.0 K/UL  
 ABS. EOSINOPHILS 0.4 0.0 - 0.4 K/UL  
 ABS. BASOPHILS 0.0 0.0 - 0.1 K/UL  
 ABS. IMM. GRANS. 0.0 0.00 - 0.04 K/UL  
 DF AUTOMATED    
URIC ACID Collection Time: 07/13/18  8:13 PM  
Result Value Ref Range Uric acid 8.4 (H) 2.6 - 6.0 MG/DL Radiologic Studies -  
XR FOOT RT MIN 3 V Final Result CT Results  (Last 48 hours) None CXR Results  (Last 48 hours) None Medical Decision Making I am the first provider for this patient. I reviewed the vital signs, available nursing notes, past medical history, past surgical history, family history and social history. Vital Signs-Reviewed the patient's vital signs. Patient Vitals for the past 12 hrs: 
 Temp Pulse Resp BP SpO2  
07/13/18 1849 98.4 °F (36.9 °C) 69 12 162/75 99 % Records Reviewed: Nursing Notes, Old Medical Records, Previous Radiology Studies and Previous Laboratory Studies Provider Notes (Medical Decision Making): DDx: fracture, cellulitis, gout flare Afebrile; well appearing; labs are reassuring, uric acid is elevated, given her previous history of infection of the foot/ankle and that we're entering the weekend will treat as gout and suspected cellulitis. ED Course:  
Initial assessment performed. The patients presenting problems have been discussed, and they are in agreement with the care plan formulated and outlined with them. I have encouraged them to ask questions as they arise throughout their visit. Disposition: 
Discharge PLAN: 
1.   
Current Discharge Medication List  
  
START taking these medications Details  
predniSONE (STERAPRED DS) 10 mg dose pack Per Dose Pack instructions Qty: 21 Tab, Refills: 0  
  
oxyCODONE-acetaminophen (PERCOCET) 5-325 mg per tablet Take 1 Tab by mouth every four (4) hours as needed for Pain. Max Daily Amount: 6 Tabs. Qty: 15 Tab, Refills: 0 Associated Diagnoses: Acute gout of right foot, unspecified cause; Cellulitis of right foot  
  
cephALEXin (KEFLEX) 500 mg capsule Take 1 Cap by mouth four (4) times daily for 7 days. Qty: 28 Cap, Refills: 0 CONTINUE these medications which have NOT CHANGED Details  
clopidogrel (PLAVIX) 75 mg tab TAKE 1 TABLET BY MOUTH EVERY DAY Qty: 90 Tab, Refills: 0  
  
  
 
2. Follow-up Information Follow up With Details Comments Contact Info Gaby Pinedo MD Schedule an appointment as soon as possible for a visit in 2 days Follow up in 2 days for a wound check [address] [place] [address] 
[phone] Return to ED if worse Diagnosis Clinical Impression: 1. Acute gout of right foot, unspecified cause 2. Cellulitis of right foot

## 2024-11-08 NOTE — ASSESSMENT & PLAN NOTE
Mixed ischemic + nonischemic cardiomyopathy, with LVEF 40% per echo 10/2024  7/2024 - per reports, had echo at outside facility showing LVEF 30-35%  Started on Toprol XL and Entresto, later started on Jardiance and spironolactone  Cardiac cath showed 80% proximal LAD disease, not intervened on  CTS evaluation for CABG recommended, not pursued  Established with heart failure service - cardiomyopathy felt to be out of proportion to level of CAD, possibly due to baseline left bundle branch block  9/2024 - cardiac MRI showed LVEF 39%, no prior infarct noted, all territories viable  10/2024 - repeat echo showed LVEF 40% despite optimal medical therapy  Biventricular pacemaker recommended

## 2024-11-08 NOTE — H&P (VIEW-ONLY)
H&P Exam - Cardiology   Amol Montes De Oca 74 y.o. male MRN: [id number]  Unit/Bed#: BE CATH LAB ROOM Encounter: [id number]    Assessment & Plan     Assessment:  Assessment & Plan  Cardiomyopathy, unspecified (HCC)  Mixed ischemic + nonischemic cardiomyopathy, with LVEF 40% per echo 10/2024  7/2024 - per reports, had echo at outside facility showing LVEF 30-35%  Started on Toprol XL and Entresto, later started on Jardiance and spironolactone  Cardiac cath showed 80% proximal LAD disease, not intervened on  CTS evaluation for CABG recommended, not pursued  Established with heart failure service - cardiomyopathy felt to be out of proportion to level of CAD, possibly due to baseline left bundle branch block  9/2024 - cardiac MRI showed LVEF 39%, no prior infarct noted, all territories viable  10/2024 - repeat echo showed LVEF 40% despite optimal medical therapy  Biventricular pacemaker recommended  HFrEF (heart failure with reduced ejection fraction) ([facility])  Wt Readings from Last 3 Encounters:   11/08/24 99.8 kg (220 lb)   10/18/24 102 kg (225 lb)   09/10/24 102 kg (225 lb)   Chronic HFrEF, currently euvolemic - follows with heart failure team as an outpatient  Atrial fibrillation, unspecified type ([facility])  Unclear diagnosis of paroxysmal atrial fibrillation  7/2024 - EKG performed at routine PCP follow-up showed possible atrial fibrillation, started on Eliquis anticoagulation ([id number] = 2)  8/2024 -event monitor showed possible A-fib, regular rhythm however difficult to discern P waves --- anticoagulation continued  Will monitor via device interrogations following BiV pacemaker implantation  Hypertension  Currently controlled, maintained on Entresto, Toprol-XL, and spironolactone  Hyperlipidemia  Maintained on statin therapy        Plan:  Biventricular pacemaker implantation.    He will require several hours of bed rest in the post device setting, along with portable chest x-ray.  Anticipate discharge home later today if  no issues noted and x-ray reviewed.    All discharge instructions and restrictions were reviewed with the patient and his wife in detail, all questions answered.  Follow-up will be arranged.      History of Present Illness   HPI:  Amol Montes De Oca is a 74 y.o. year old male with mixed ischemic and nonischemic cardiomyopathy with LVEF 40% per most recent echo 10/2024, baseline left bundle branch block with wide QRS, chronic HFrEF, single-vessel CAD with 80% proximal LAD stenosis noted per cath 7/2024, hypertension, and hyperlipidemia.  In 7/2024 he presented for routine office visit with his PCP, at which time EKG showed possible atrial fibrillation.  He was started on Eliquis anticoagulation, and follow-up echocardiogram reportedly showed newly reduced LVEF 30-35%.  He was started on optimal medical therapy with Toprol-XL and Entresto, and later Jardiance and spironolactone were added to that regimen.  He was seen in EP consultation given his newly reduced EF and baseline the bundle branch block, ischemic workup was recommended with potential device placement if no improvement was noted.  He underwent cardiac catheterization which showed 80% proximal LAD disease which was not intervened on.  Potential CT surgery consultation was recommended for possible CABG, however this was not pursued.  He also wore an event monitor which showed NSVT and runs of SVT, however it was difficult to determine whether he truly had atrial fibrillation and this anticoagulation was continued.  He underwent a cardiac MRI 9/2024 which showed LVEF 39% without evidence of prior infarct and all areas viable.  Repeat echocardiogram 10/2024 after 3 months of optimal medical therapy still showed LVEF 40%.  Ultimately, biventricular pacemaker implantation was recommended as it was felt that his left bundle branch block was contributing to his cardiomyopathy.  He presents today to undergo that procedure.  No significant changes over the recent  past.      Review of Systems  ROS as noted above, otherwise 12 point review of systems was performed and is negative.       Historical Information   Past Medical History:   Diagnosis Date    COVID-19     1/2023 recoverd @ home    GERD (gastroesophageal reflux disease)     Gout     Hyperlipidemia     Hypertension     Serum cholesterol elevated      Past Surgical History:   Procedure Laterality Date    CARDIAC CATHETERIZATION Left 7/22/2024    Procedure: Cardiac catheterization;  Surgeon: Elizabeth Dawn DO;  Location: [facility];  Service: Cardiology    CARDIAC CATHETERIZATION N/A 7/22/2024    Procedure: Cardiac Coronary Angiogram;  Surgeon: Elizabeth Dawn DO;  Location: [facility]ARDIAC [facility];  Service: Cardiology    COLONOSCOPY      LIPOMA RESECTION Left     Left lower abdomen/groin area    SUBMANDIBULAR GLAND EXCISION Left 6/14/2023    Procedure: SUBMANDIBULAR GLAND EXICISON;  Surgeon: Andrew Hummel MD;  Location: [facility];  Service: ENT     Family History: No family history on file.    Social History   Social History     Substance and Sexual Activity   Alcohol Use Not Currently    Comment: 1 drink a day. varies beer wine or liqour 6/12     Social History     Substance and Sexual Activity   Drug Use Never     Social History     Tobacco Use   Smoking Status Never   Smokeless Tobacco Never   Tobacco Comments    No smoking in the home         Meds/Allergies   all medications and allergies reviewed  Home Medications:   Medications Prior to Admission:     atorvastatin (LIPITOR) 20 mg tablet    CALCIUM-VITAMIN D PO    indomethacin (INDOCIN) 50 mg capsule    loratadine (CLARITIN) 10 mg tablet    Multiple Vitamins-Minerals (multivitamin with minerals) tablet    sacubitril-valsartan (Entresto) 24-26 MG TABS    spironolactone (ALDACTONE) 25 mg tablet    VITAMIN D PO    acetaminophen (TYLENOL) 325 mg tablet    Eliquis 5 MG    Empagliflozin (JARDIANCE) 10 MG TABS tablet    LORazepam (ATIVAN) 0.5 mg  "tablet    meclizine (ANTIVERT) 12.5 MG tablet    metoprolol succinate (TOPROL-XL) 25 mg 24 hr tablet    testosterone (ANDROGEL) 25 MG/2.5GM (1%) GEL    No Known Allergies    Objective   Vitals: Blood pressure 148/74, pulse 76, temperature 98.2 °F (36.8 °C), temperature source Temporal, resp. rate 16, height 6' 2\" (1.88 m), weight 99.8 kg (220 lb), SpO2 96%.  Orthostatic Blood Pressures      Flowsheet Row Most Recent Value   Blood Pressure 148/74 filed at 11/08/2024 0715   Patient Position - Orthostatic VS Lying filed at 11/08/2024 0715            No intake or output data in the 24 hours ending 11/08/24 1001    Invasive Devices       Peripheral Intravenous Line  Duration             Peripheral IV 11/08/24 Left Antecubital <1 day                    Physical Exam  GEN: NAD, alert and oriented x 3, well appearing  SKIN: dry without significant lesions or rashes  HEENT: NCAT, PERRL, EOMs intact  NECK: No JVD appreciated  CARDIOVASCULAR: RRR  LUNGS: Good overall effort, no respiratory distress, no audible wheezing or rhonchi noted  ABDOMEN: Soft, nontender, nondistended  EXTREMITIES/VASCULAR: perfused without clubbing, cyanosis, or LE edema b/l  PSYCH: Normal mood and affect  NEURO: CN ll-Xll grossly intact      Lab Results: I have personally reviewed pertinent lab results.    Results from last 7 days   Lab Units 11/08/24  0711   WBC Thousand/uL 7.59   HEMOGLOBIN g/dL 17.7*   HEMATOCRIT % 54.8*   PLATELETS Thousands/uL 197     Results from last 7 days   Lab Units 11/08/24  0711   POTASSIUM mmol/L 3.3*   CHLORIDE mmol/L 104   CO2 mmol/L 30   BUN mg/dL 21   CREATININE mg/dL 1.00   CALCIUM mg/dL 8.9     Results from last 7 days   Lab Units 11/08/24  0711   INR  0.97             Imaging: Results Review Statement: No pertinent imaging studies reviewed.    ECHO: No results found for this or any previous visit.      Results for orders placed during the hospital encounter of 10/18/24    Echo complete w/ contrast if " indicated    Interpretation Summary    Left Ventricle: Left ventricular cavity size is normal. Wall thickness is mildly increased. There is mild to moderate concentric hypertrophy. The left ventricular ejection fraction is 40% by single dimension measurement. Systolic function is moderately reduced. There is moderate global hypokinesis. Diastolic function is normal for age.  Left atrial filling pressure is normal.    IVS: There is abnormal septal motion consistent with left bundle branch block.    Aortic Valve: The aortic valve is trileaflet. The leaflets are mildly thickened. The leaflets are mildly calcified. There is mildly reduced mobility. There is no evidence of regurgitation. There is mild stenosis. The aortic valve peak velocity is 2.26 m/s. The aortic valve mean gradient is 12 mmHg. The dimensionless velocity index is 0.45. The aortic valve area is 2.41 cm2. The stroke volume index is 51.50 ml/m2.    Mitral Valve: There is mild thickening. There is mild calcification. The leaflets exhibit normal mobility.  There is fibrocalcific disease of the mitral valve leaflets and subvalvular apparatus.    Tricuspid Valve: The right ventricular systolic pressure is normal. The estimated right ventricular systolic pressure is 35.00 mmHg.        EKG: pending        Code Status: Level 1 - Full Code

## 2024-11-08 NOTE — ANESTHESIA PREPROCEDURE EVALUATION
Procedure:  Cardiac biv pacer implant (Chest)    Relevant Problems   ANESTHESIA (within normal limits)      CARDIO   (+) Atrial fibrillation, unspecified type (HCC)   (+) Coronary artery disease involving native coronary artery of native heart with angina pectoris (HCC)   (+) Hyperlipidemia   (+) Hypertension      GI/HEPATIC   (+) GERD (gastroesophageal reflux disease)      MUSCULOSKELETAL   (+) Gout      Cardiovascular/Peripheral Vascular   (+) HFrEF (heart failure with reduced ejection fraction) ([facility])        TTE 10/18/2024    Left Ventricle: Left ventricular cavity size is normal. Wall thickness is mildly increased. There is mild to moderate concentric hypertrophy. The left ventricular ejection fraction is 40% by single dimension measurement. Systolic function is moderately reduced. There is moderate global hypokinesis. Diastolic function is normal for age.  Left atrial filling pressure is normal.    IVS: There is abnormal septal motion consistent with left bundle branch block.    Aortic Valve: The aortic valve is trileaflet. The leaflets are mildly thickened. The leaflets are mildly calcified. There is mildly reduced mobility. There is no evidence of regurgitation. There is mild stenosis. The aortic valve peak velocity is 2.26 m/s. The aortic valve mean gradient is 12 mmHg. The dimensionless velocity index is 0.45. The aortic valve area is 2.41 cm2. The stroke volume index is 51.50 ml/m2.    Mitral Valve: There is mild thickening. There is mild calcification. The leaflets exhibit normal mobility.  There is fibrocalcific disease of the mitral valve leaflets and subvalvular apparatus.    Tricuspid Valve: The right ventricular systolic pressure is normal. The estimated right ventricular systolic pressure is 35.00 mmHg.      Physical Exam    Airway    Mallampati score: III  TM Distance: >3 FB  Neck ROM: full     Dental        Cardiovascular      Pulmonary      Other Findings        Anesthesia Plan  ASA Score- 3      Anesthesia Type- IV sedation with anesthesia with ASA Monitors.         Additional Monitors:     Airway Plan:            Plan Factors-Exercise tolerance (METS): >4 METS.    Chart reviewed. EKG reviewed.  Existing labs reviewed. Patient summary reviewed.    Patient is not a current smoker.              Induction- intravenous.    Postoperative Plan-     Perioperative Resuscitation Plan - Level 1 - Full Code.       Informed Consent- Anesthetic plan and risks discussed with patient.  I personally reviewed this patient with the CRNA. Discussed and agreed on the Anesthesia Plan with the CRNA..

## 2024-11-08 NOTE — ASSESSMENT & PLAN NOTE
Unclear diagnosis of paroxysmal atrial fibrillation  7/2024 - EKG performed at routine PCP follow-up showed possible atrial fibrillation, started on Eliquis anticoagulation ([id number] = 2)  8/2024 -event monitor showed possible A-fib, regular rhythm however difficult to discern P waves --- anticoagulation continued  Will monitor via device interrogations following BiV pacemaker implantation

## 2024-11-08 NOTE — ASSESSMENT & PLAN NOTE
Wt Readings from Last 3 Encounters:   11/08/24 99.8 kg (220 lb)   10/18/24 102 kg (225 lb)   09/10/24 102 kg (225 lb)   Chronic HFrEF, currently euvolemic - follows with heart failure team as an outpatient

## 2024-11-08 NOTE — ANESTHESIA POSTPROCEDURE EVALUATION
Post-Op Assessment Note    CV Status:  Stable  Pain Score: 0    Pain management: adequate       Mental Status:  Alert and awake   Hydration Status:  Euvolemic   PONV Controlled:  Controlled   Airway Patency:  Patent     Post Op Vitals Reviewed: Yes    No anethesia notable event occurred.    Staff: Anesthesiologist, CRNA           Last Filed PACU Vitals:  Vitals Value Taken Time   Temp     Pulse 78    /62    Resp 14    SpO2 95        Modified Teofilo:  No data recorded

## 2024-11-08 NOTE — DISCHARGE INSTR - AVS FIRST PAGE
Please refer to post pacemaker implantation discharge instructions and restrictions and your pacemaker booklet/temporary card.     Keep incision dry for one week. Leave outer bandage in place for 1 week - it is water proof, and as long as it is fully adhered to your skin you may shower with it.  If it appears as though the bandage is coming off and/or there is any communication to the area of device incision, please then keep the whole area dry for the remaining week.  After 1 week, please remove by pulling all edges away from the center of the bandage. After the bandage is removed, you may then shower normally and get the area wet with soap and water, no scrubbing, and pat dry. Do not use lotions/powders/creams on incision.    No overhead reaching/pushing/pulling/lifting greater than 5-10lbs with left arm for six weeks. Please call the office if you notice redness, swelling, bleeding, or drainage from incision or if you develop fevers.       AFTER PACEMAKER CARE:    If you have any questions, please call [phone] to speak with a nurse (8:30am-4pm, or [phone] after hours). For appointments, please call [phone].      WHAT YOU SHOULD KNOW:   A pacemaker is a small, battery-powered device that is placed under your skin in your upper chest area with wires placed through a vein that lead directly into the heart. It helps regulate your heart rate and prevent your heart from beating too slowly.                 AFTER YOU LEAVE:     Medicines:     Pain medicine:  You may need medicine to take away or decrease pain.     Learn how to take your medicine. Ask what medicine and how much you should take. Be sure you know how, when, and how often to take it. Usually Over the counter pain medicine is sufficient to control pain (Acetominophen or Ibuprofen) Ask your doctor if you may take these. If this does not control your pain, narcotic pain killers may be prescribed, please call if you need prescription.     Do not  wait until the pain is severe before you take your medicine. Tell caregivers if your pain does not decrease.    Pain medicine can make you dizzy or sleepy. Prevent falls by calling someone when you get out of bed or if you need help.        Take your medicine as directed.  Call your healthcare provider if you think your medicine is not helping or if you have side effects. Tell him if you are allergic to any medicine.    Follow up with your cardiologist after your procedure:  You will need a follow-up visit approximately 2 weeks after you leave the hospital. Your cardiologist will check your wound and make sure that your pacemaker is working correctly.     Follow the instructions to check your pacemaker:  Your cardiologist or primary healthcare provider will check your pacemaker and the battery regularly.  He will use a computer to check your pacemaker over the telephone or wireless device which will be given to you.     Pacemaker batteries usually last 8 to 10 years. The pacemaker unit will be replaced when the battery gets low. This is a simpler procedure than the original one to implant your pacemaker.    Wound care:  Keep your incision dry for one week.  Do not use lotions/powders/creams on incision.     Leave outer bandage in place for 1 week - it is water proof, and as long as it is fully adhered to your skin you may shower with it.  If it appears as though the bandage is coming off and/or there is any communication to the area of device incision, please then keep the whole area dry for the remaining week.  After 1 week, please remove by pulling all edges away from the center of the bandage.    Please call the office if you notice redness, swelling, bleeding, or drainage from incision or if you develop fevers.       Activity:   Arm movement and lifting:  Be careful using the arm on the side of your pacemaker. Do not move your arm for the first 24 hours after your procedure. Do not  lift your arm above your  shoulder or lift more than 10 pounds for one month after your procedure. Avoid pushing, pulling, or repetitive arm movements for 6 weeks. This helps the leads stay in place and helps your wound heal. Ask your caregiver when you can drive after your procedure. You may move your arm side to side without lifting above your shoulder, and do not need to wear a sling at home.   Driving: you are ok to drive 48 hours after pacemaker is implanted   Sports:  Ask your caregiver when it is okay to play tennis, golf, basketball, or any sport that requires you to lift your arms. Do not play full contact sports, such as football, that could damage your pacemaker. Ask your cardiologist or primary healthcare provider how much and what kinds of physical activity are safe for you.    Living with a pacemaker:   Tell all caregivers you have a pacemaker:  This includes surgeons, radiologists, and medical technicians. You may want to wear a medical alert ID bracelet or necklace that states that you have a pacemaker.    Carry your pacemaker ID card:  Make sure you receive a pacemaker ID card. Carry it with you at all times. It lists important information about your pacemaker. Show it to airport security if you travel.     Avoid electrical interference:  Avoid welding equipment and other equipment with large magnets or electric fields. These things could interfere with how your pacemaker works. Use your cell phone on the ear opposite from your pacemaker. Do not carry your cell phone in your shirt pocket over your chest.     Some Pacemakers are MRI safe. Ask you doctor if it is safe to proceed with MRI and let the radiologist and staff know you have a pacemaker.     Do not touch the skin around your pacemaker:  This can cause damage to the lead wires or move the pacemaker unit from where it should be.    Contact your cardiologist or primary healthcare provider if:   The area around your pacemaker has increasing amount of pain after surgery.  The pain should improve over first few days after implantation.     The skin around your stitches has increasing redness, swelling, or has drainage. This may mean that you have an infection.     You have a fever.     You have chills, a cough, and feel weak or achy. These are also signs of infection.    Your feet or ankles are more swollen than your baseline.     Your Heart rate is less than 50 beats per minute     Seek care immediately if:   Your bandage becomes soaked with blood.     Your pacemaker is swelling rapidly    Your stitches open up.     You feel your heart suddenly beating very slowly or quickly.    You become too weak or dizzy to stand, or you pass out.     Your arm or leg feels warm, tender, and painful. It may look swollen and red.    You have chest pain that does not go away with rest or medicine.     You feel lightheaded, short of breath, and have chest pain.     You cough up blood.        © 2014 [employer]. Information is for End User's use only and may not be sold, redistributed or otherwise used for commercial purposes. All illustrations and images included in CareNotes® are the copyrighted property of [employer] or [employer].  The above information is an  only. It is not intended as medical advice for individual conditions or treatments. Talk to your doctor, nurse or pharmacist before following any medical regimen to see if it is safe and effective for you.

## 2024-11-09 LAB
ATRIAL RATE: 65 BPM
PR INTERVAL: 158 MS
QRS AXIS: 73 DEGREES
QRSD INTERVAL: 112 MS
QT INTERVAL: 432 MS
QTC INTERVAL: 450 MS
T WAVE AXIS: 4 DEGREES
VENTRICULAR RATE: 65 BPM

## 2024-11-09 PROCEDURE — 93010 ELECTROCARDIOGRAM REPORT: CPT | Performed by: INTERNAL MEDICINE

## 2024-11-16 NOTE — ANESTHESIA POSTPROCEDURE EVALUATION
Post-Op Assessment Note    CV Status:  Stable    Pain management: adequate       Mental Status:  Alert and awake   Hydration Status:  Euvolemic   PONV Controlled:  Controlled   Airway Patency:  Patent     Post Op Vitals Reviewed: Yes    No anethesia notable event occurred.    Staff: Anesthesiologist           Last Filed PACU Vitals:  Vitals Value Taken Time   Temp 97.4 °F (36.3 °C) 11/08/24 1030   Pulse 60 11/08/24 1330   /68 11/08/24 1330   Resp 16 11/08/24 1030   SpO2 94 % 11/08/24 1045       Modified Teofilo:  No data recorded

## 2024-11-18 ENCOUNTER — APPOINTMENT (OUTPATIENT)
Dept: LAB | Facility: CLINIC | Age: 74
End: 2024-11-18
Payer: COMMERCIAL

## 2024-11-18 DIAGNOSIS — I25.10 CORONARY ARTERY DISEASE INVOLVING NATIVE CORONARY ARTERY OF NATIVE HEART WITHOUT ANGINA PECTORIS: ICD-10-CM

## 2024-11-18 LAB
ALBUMIN SERPL BCG-MCNC: 4.3 G/DL (ref 3.5–5)
ALP SERPL-CCNC: 97 U/L (ref 34–104)
ALT SERPL W P-5'-P-CCNC: 25 U/L (ref 7–52)
ANION GAP SERPL CALCULATED.3IONS-SCNC: 6 MMOL/L (ref 4–13)
AST SERPL W P-5'-P-CCNC: 22 U/L (ref 13–39)
BASOPHILS # BLD AUTO: 0.03 THOUSANDS/ÂΜL (ref 0–0.1)
BASOPHILS NFR BLD AUTO: 0 % (ref 0–1)
BILIRUB SERPL-MCNC: 0.68 MG/DL (ref 0.2–1)
BUN SERPL-MCNC: 20 MG/DL (ref 5–25)
CALCIUM SERPL-MCNC: 9.8 MG/DL (ref 8.4–10.2)
CHLORIDE SERPL-SCNC: 103 MMOL/L (ref 96–108)
CO2 SERPL-SCNC: 32 MMOL/L (ref 21–32)
CREAT SERPL-MCNC: 1.08 MG/DL (ref 0.6–1.3)
EOSINOPHIL # BLD AUTO: 0.49 THOUSAND/ÂΜL (ref 0–0.61)
EOSINOPHIL NFR BLD AUTO: 6 % (ref 0–6)
ERYTHROCYTE [DISTWIDTH] IN BLOOD BY AUTOMATED COUNT: 17.4 % (ref 11.6–15.1)
GFR SERPL CREATININE-BSD FRML MDRD: 66 ML/MIN/1.73SQ M
GLUCOSE P FAST SERPL-MCNC: 100 MG/DL (ref 65–99)
HCT VFR BLD AUTO: 59.4 % (ref 36.5–49.3)
HGB BLD-MCNC: 18.9 G/DL (ref 12–17)
IMM GRANULOCYTES # BLD AUTO: 0.02 THOUSAND/UL (ref 0–0.2)
IMM GRANULOCYTES NFR BLD AUTO: 0 % (ref 0–2)
LYMPHOCYTES # BLD AUTO: 2.22 THOUSANDS/ÂΜL (ref 0.6–4.47)
LYMPHOCYTES NFR BLD AUTO: 27 % (ref 14–44)
MCH RBC QN AUTO: 27.1 PG (ref 26.8–34.3)
MCHC RBC AUTO-ENTMCNC: 31.8 G/DL (ref 31.4–37.4)
MCV RBC AUTO: 85 FL (ref 82–98)
MONOCYTES # BLD AUTO: 0.54 THOUSAND/ÂΜL (ref 0.17–1.22)
MONOCYTES NFR BLD AUTO: 7 % (ref 4–12)
NEUTROPHILS # BLD AUTO: 4.81 THOUSANDS/ÂΜL (ref 1.85–7.62)
NEUTS SEG NFR BLD AUTO: 60 % (ref 43–75)
NRBC BLD AUTO-RTO: 0 /100 WBCS
PLATELET # BLD AUTO: 213 THOUSANDS/UL (ref 149–390)
PMV BLD AUTO: 10.5 FL (ref 8.9–12.7)
POTASSIUM SERPL-SCNC: 4.1 MMOL/L (ref 3.5–5.3)
PROT SERPL-MCNC: 7.5 G/DL (ref 6.4–8.4)
RBC # BLD AUTO: 6.98 MILLION/UL (ref 3.88–5.62)
SODIUM SERPL-SCNC: 141 MMOL/L (ref 135–147)
WBC # BLD AUTO: 8.11 THOUSAND/UL (ref 4.31–10.16)

## 2024-11-22 ENCOUNTER — RESULTS FOLLOW-UP (OUTPATIENT)
Dept: CARDIOLOGY CLINIC | Facility: CLINIC | Age: 74
End: 2024-11-22

## 2024-11-22 ENCOUNTER — IN-CLINIC DEVICE VISIT (OUTPATIENT)
Dept: CARDIOLOGY CLINIC | Facility: CLINIC | Age: 74
End: 2024-11-22

## 2024-11-22 DIAGNOSIS — Z95.0 CARDIAC PACEMAKER IN SITU: Primary | ICD-10-CM

## 2024-11-22 PROCEDURE — 99024 POSTOP FOLLOW-UP VISIT: CPT | Performed by: INTERNAL MEDICINE

## 2024-11-22 NOTE — PROGRESS NOTES
Results for orders placed or performed in visit on 11/22/24   Cardiac EP device report    Narrative    MDT DC/PM-ACTIVE SYSTEM IS MRI CONDITIONAL  DEVICE INTERROGATED IN THE [place] OFFICE: BATTERY VOLTAGE ADEQUATE-10 YRS. AP 49%  98%. ALL AVAILABLE LEAD PARAMETERS WITHIN NORMAL LIMITS. NO SIGNIFICANT HIGH RATE EPISODES. NO PROGRAMMING CHANGES MADE TO DEVICE PARAMETERS. WOUND CHECK: INCISION CLEAN AND DRY WITH EDGES APPROXIMATED; WOUND CARE AND RESTRICTIONS REVIEWED WITH PATIENT. NC

## 2024-12-02 ENCOUNTER — HOSPITAL ENCOUNTER (OUTPATIENT)
Facility: HOSPITAL | Age: 74
Setting detail: OUTPATIENT SURGERY
Discharge: HOME | End: 2024-12-02
Attending: INTERNAL MEDICINE | Admitting: INTERNAL MEDICINE
Payer: COMMERCIAL

## 2024-12-02 VITALS
DIASTOLIC BLOOD PRESSURE: 63 MMHG | HEART RATE: 78 BPM | OXYGEN SATURATION: 96 % | WEIGHT: 215 LBS | RESPIRATION RATE: 16 BRPM | HEIGHT: 74 IN | BODY MASS INDEX: 27.59 KG/M2 | TEMPERATURE: 97.3 F | SYSTOLIC BLOOD PRESSURE: 120 MMHG

## 2024-12-02 DIAGNOSIS — I48.91 ATRIAL FIBRILLATION, UNSPECIFIED TYPE (HCC): ICD-10-CM

## 2024-12-02 DIAGNOSIS — Z95.5 S/P CORONARY ARTERY STENT PLACEMENT: Primary | ICD-10-CM

## 2024-12-02 DIAGNOSIS — Z95.0 STATUS POST PLACEMENT OF CARDIAC PACEMAKER: ICD-10-CM

## 2024-12-02 DIAGNOSIS — I25.10 CORONARY ARTERY DISEASE INVOLVING NATIVE CORONARY ARTERY OF NATIVE HEART WITHOUT ANGINA PECTORIS: ICD-10-CM

## 2024-12-02 DIAGNOSIS — I25.119 CORONARY ARTERY DISEASE INVOLVING NATIVE CORONARY ARTERY OF NATIVE HEART WITH ANGINA PECTORIS (HCC): ICD-10-CM

## 2024-12-02 LAB
ATRIAL RATE: 73 BPM
ATRIAL RATE: 78 BPM
KCT BLD-ACNC: 250 SEC (ref 89–137)
KCT BLD-ACNC: 293 SEC (ref 89–137)
P AXIS: 64 DEGREES
P AXIS: 69 DEGREES
PR INTERVAL: 160 MS
PR INTERVAL: 166 MS
QRS AXIS: 39 DEGREES
QRS AXIS: 57 DEGREES
QRSD INTERVAL: 100 MS
QRSD INTERVAL: 100 MS
QT INTERVAL: 390 MS
QT INTERVAL: 392 MS
QTC INTERVAL: 432 MS
QTC INTERVAL: 445 MS
SPECIMEN SOURCE: ABNORMAL
SPECIMEN SOURCE: ABNORMAL
T WAVE AXIS: 106 DEGREES
T WAVE AXIS: 110 DEGREES
VENTRICULAR RATE: 73 BPM
VENTRICULAR RATE: 78 BPM

## 2024-12-02 PROCEDURE — 93010 ELECTROCARDIOGRAM REPORT: CPT | Performed by: INTERNAL MEDICINE

## 2024-12-02 PROCEDURE — 99152 MOD SED SAME PHYS/QHP 5/>YRS: CPT | Performed by: INTERNAL MEDICINE

## 2024-12-02 PROCEDURE — 92978 ENDOLUMINL IVUS OCT C 1ST: CPT | Performed by: INTERNAL MEDICINE

## 2024-12-02 PROCEDURE — C1725 CATH, TRANSLUMIN NON-LASER: HCPCS | Performed by: INTERNAL MEDICINE

## 2024-12-02 PROCEDURE — C1894 INTRO/SHEATH, NON-LASER: HCPCS | Performed by: INTERNAL MEDICINE

## 2024-12-02 PROCEDURE — 93005 ELECTROCARDIOGRAM TRACING: CPT

## 2024-12-02 PROCEDURE — C1753 CATH, INTRAVAS ULTRASOUND: HCPCS | Performed by: INTERNAL MEDICINE

## 2024-12-02 PROCEDURE — C1769 GUIDE WIRE: HCPCS | Performed by: INTERNAL MEDICINE

## 2024-12-02 PROCEDURE — 99153 MOD SED SAME PHYS/QHP EA: CPT | Performed by: INTERNAL MEDICINE

## 2024-12-02 PROCEDURE — C1874 STENT, COATED/COV W/DEL SYS: HCPCS | Performed by: INTERNAL MEDICINE

## 2024-12-02 PROCEDURE — 85347 COAGULATION TIME ACTIVATED: CPT

## 2024-12-02 PROCEDURE — NC001 PR NO CHARGE: Performed by: INTERNAL MEDICINE

## 2024-12-02 PROCEDURE — C9600 PERC DRUG-EL COR STENT SING: HCPCS | Performed by: INTERNAL MEDICINE

## 2024-12-02 PROCEDURE — 92928 PRQ TCAT PLMT NTRAC ST 1 LES: CPT | Performed by: INTERNAL MEDICINE

## 2024-12-02 PROCEDURE — C1887 CATHETER, GUIDING: HCPCS | Performed by: INTERNAL MEDICINE

## 2024-12-02 DEVICE — STENT ONYXNG35026UX ONYX 3.50X26RX
Type: IMPLANTABLE DEVICE | Site: CORONARY | Status: FUNCTIONAL
Brand: ONYX FRONTIER™

## 2024-12-02 RX ORDER — CLOPIDOGREL BISULFATE 75 MG/1
75 TABLET ORAL DAILY
Qty: 90 TABLET | Refills: 0 | Status: SHIPPED | OUTPATIENT
Start: 2024-12-03

## 2024-12-02 RX ORDER — HEPARIN SODIUM 1000 [USP'U]/ML
INJECTION, SOLUTION INTRAVENOUS; SUBCUTANEOUS CODE/TRAUMA/SEDATION MEDICATION
Status: DISCONTINUED | OUTPATIENT
Start: 2024-12-02 | End: 2024-12-02 | Stop reason: HOSPADM

## 2024-12-02 RX ORDER — CLOPIDOGREL BISULFATE 75 MG/1
75 TABLET ORAL DAILY
Qty: 90 TABLET | Refills: 3 | Status: SHIPPED | OUTPATIENT
Start: 2024-12-03 | End: 2024-12-02

## 2024-12-02 RX ORDER — VERAPAMIL HYDROCHLORIDE 2.5 MG/ML
INJECTION, SOLUTION INTRAVENOUS CODE/TRAUMA/SEDATION MEDICATION
Status: DISCONTINUED | OUTPATIENT
Start: 2024-12-02 | End: 2024-12-02 | Stop reason: HOSPADM

## 2024-12-02 RX ORDER — FENTANYL CITRATE 50 UG/ML
INJECTION, SOLUTION INTRAMUSCULAR; INTRAVENOUS CODE/TRAUMA/SEDATION MEDICATION
Status: DISCONTINUED | OUTPATIENT
Start: 2024-12-02 | End: 2024-12-02 | Stop reason: HOSPADM

## 2024-12-02 RX ORDER — SILVER SULFADIAZINE 10 MG/G
CREAM TOPICAL 2 TIMES DAILY PRN
Status: DISCONTINUED | OUTPATIENT
Start: 2024-12-02 | End: 2024-12-02 | Stop reason: HOSPADM

## 2024-12-02 RX ORDER — NITROGLYCERIN 20 MG/100ML
INJECTION INTRAVENOUS CODE/TRAUMA/SEDATION MEDICATION
Status: DISCONTINUED | OUTPATIENT
Start: 2024-12-02 | End: 2024-12-02 | Stop reason: HOSPADM

## 2024-12-02 RX ORDER — SODIUM CHLORIDE 9 MG/ML
125 INJECTION, SOLUTION INTRAVENOUS CONTINUOUS
Status: DISCONTINUED | OUTPATIENT
Start: 2024-12-02 | End: 2024-12-02 | Stop reason: HOSPADM

## 2024-12-02 RX ORDER — CLOPIDOGREL BISULFATE 75 MG/1
600 TABLET ORAL ONCE
Status: COMPLETED | OUTPATIENT
Start: 2024-12-02 | End: 2024-12-02

## 2024-12-02 RX ORDER — SILVER SULFADIAZINE 10 MG/G
CREAM TOPICAL 2 TIMES DAILY PRN
Qty: 15 G | Refills: 0 | Status: SHIPPED | OUTPATIENT
Start: 2024-12-02 | End: 2024-12-09

## 2024-12-02 RX ORDER — LIDOCAINE HYDROCHLORIDE 20 MG/ML
1 JELLY TOPICAL DAILY PRN
Status: DISCONTINUED | OUTPATIENT
Start: 2024-12-02 | End: 2024-12-02 | Stop reason: HOSPADM

## 2024-12-02 RX ORDER — MIDAZOLAM HYDROCHLORIDE 2 MG/2ML
INJECTION, SOLUTION INTRAMUSCULAR; INTRAVENOUS CODE/TRAUMA/SEDATION MEDICATION
Status: DISCONTINUED | OUTPATIENT
Start: 2024-12-02 | End: 2024-12-02 | Stop reason: HOSPADM

## 2024-12-02 RX ORDER — APIXABAN 5 MG/1
5 TABLET, FILM COATED ORAL 2 TIMES DAILY
Start: 2024-12-03

## 2024-12-02 RX ORDER — LIDOCAINE HYDROCHLORIDE 10 MG/ML
INJECTION, SOLUTION EPIDURAL; INFILTRATION; INTRACAUDAL; PERINEURAL CODE/TRAUMA/SEDATION MEDICATION
Status: DISCONTINUED | OUTPATIENT
Start: 2024-12-02 | End: 2024-12-02 | Stop reason: HOSPADM

## 2024-12-02 RX ORDER — CLOPIDOGREL BISULFATE 75 MG/1
75 TABLET ORAL DAILY
Qty: 90 TABLET | Refills: 3 | Status: SHIPPED | OUTPATIENT
Start: 2024-12-02 | End: 2024-12-02

## 2024-12-02 RX ORDER — ASPIRIN 81 MG/1
324 TABLET, CHEWABLE ORAL ONCE
Status: COMPLETED | OUTPATIENT
Start: 2024-12-02 | End: 2024-12-02

## 2024-12-02 RX ADMIN — ASPIRIN 81 MG CHEWABLE TABLET 324 MG: 81 TABLET CHEWABLE at 07:36

## 2024-12-02 RX ADMIN — CLOPIDOGREL BISULFATE 600 MG: 75 TABLET ORAL at 07:37

## 2024-12-02 RX ADMIN — SODIUM CHLORIDE 125 ML/HR: 0.9 INJECTION, SOLUTION INTRAVENOUS at 07:37

## 2024-12-02 NOTE — INTERVAL H&P NOTE
"H&P reviewed. After examining the patient, I find no changed to the H&P since it had been written.    Patient re-evaluated. Accept as history and physical.  Pulse 79   Temp 97.6 °F (36.4 °C) (Temporal)   Resp 20   Ht 6' 2\" (1.88 m)   Wt 97.5 kg (215 lb)   SpO2 95%   BMI 27.60 kg/m²   Procedure explained to patient including risk factors.  All questions answered.    Consent signed.    Patient admitted for staged PCI of his LAD.  Right sinus    SUSY Yancey/December 2, 2024/9:31 AM  "

## 2024-12-02 NOTE — DISCHARGE SUMMARY
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 Kateryna Palma  Interventional Cardiologist: [facility] Course:  This is a 74-year-old male with history of coronary artery disease, mixed cardiomyopathy hypertension, hyperlipidemia and atrial fibrillation on Eliquis.      Intervention performed:  S/P IVUS-Guided PCI with ROBERT x 1 to the Prox LAD 85% stenosis    Criteria for same day discharge:    Patient underwent, uncomplicated PCI /stent on 12th 2/2/24 without hemodynamic complications or instability per institutional protocol.    Patient will be accessed and discharged later today specifically noting the following criteria:    Uncomplicated procedure as above before above.    Patient is less than 80 years old with preserved LV function and no significant valvulopathy  Creatinine is less than 1.5  Preserved LV function  Patient has been observed for more than 4 hours and they live within 30 miles of 1 of our network hospital.  Patient will have a family member or friend stay with patient overnight.    Patient and family are agreeable to this discharge.      Orders placed:    BMP is ordered for 12/3/2024.    Cardiac rehab referral as been made.     Office follow-up appointment: Dr. Palma 12/19 2024 at 11:40 am [facility] office.    Medications ordered upon discharge: Patient will be on Plavix 75 mg daily and his Eliquis 5 mg twice daily.  No ASA due to the Plavix and aspirin and increased risk for bleeding.  Follow-up phone call will be made by a Cath Lab RN on  12/3/2024    Planned readmission: None    Discharge Statement:  I have spent 30 minutes minutes discharging this patient.  This time was spent on the day of discharge.  I had direct contact with the patient on the day of discharge.      **Please note Fluency direct dictation voice to text software has been used in the creation of this document**

## 2024-12-03 ENCOUNTER — APPOINTMENT (OUTPATIENT)
Dept: LAB | Facility: CLINIC | Age: 74
End: 2024-12-03
Payer: COMMERCIAL

## 2024-12-03 DIAGNOSIS — Z95.5 S/P CORONARY ARTERY STENT PLACEMENT: ICD-10-CM

## 2024-12-03 LAB
ANION GAP SERPL CALCULATED.3IONS-SCNC: 7 MMOL/L (ref 4–13)
BUN SERPL-MCNC: 30 MG/DL (ref 5–25)
CALCIUM SERPL-MCNC: 9.5 MG/DL (ref 8.4–10.2)
CHLORIDE SERPL-SCNC: 104 MMOL/L (ref 96–108)
CO2 SERPL-SCNC: 31 MMOL/L (ref 21–32)
CREAT SERPL-MCNC: 0.92 MG/DL (ref 0.6–1.3)
GFR SERPL CREATININE-BSD FRML MDRD: 80 ML/MIN/1.73SQ M
GLUCOSE P FAST SERPL-MCNC: 89 MG/DL (ref 65–99)
POTASSIUM SERPL-SCNC: 3.5 MMOL/L (ref 3.5–5.3)
SODIUM SERPL-SCNC: 142 MMOL/L (ref 135–147)

## 2024-12-03 PROCEDURE — 80048 BASIC METABOLIC PNL TOTAL CA: CPT

## 2024-12-03 PROCEDURE — 36415 COLL VENOUS BLD VENIPUNCTURE: CPT

## 2024-12-09 ENCOUNTER — OFFICE VISIT (OUTPATIENT)
Dept: URGENT CARE | Facility: MEDICAL CENTER | Age: 74
End: 2024-12-09
Payer: COMMERCIAL

## 2024-12-09 VITALS
SYSTOLIC BLOOD PRESSURE: 108 MMHG | RESPIRATION RATE: 18 BRPM | HEIGHT: 74 IN | OXYGEN SATURATION: 100 % | DIASTOLIC BLOOD PRESSURE: 73 MMHG | WEIGHT: 221 LBS | HEART RATE: 70 BPM | TEMPERATURE: 97.4 F | BODY MASS INDEX: 28.36 KG/M2

## 2024-12-09 DIAGNOSIS — L03.011 PARONYCHIA OF FINGER OF RIGHT HAND: Primary | ICD-10-CM

## 2024-12-09 PROCEDURE — 99213 OFFICE O/P EST LOW 20 MIN: CPT | Performed by: NURSE PRACTITIONER

## 2024-12-09 RX ORDER — CEPHALEXIN 500 MG/1
500 CAPSULE ORAL EVERY 6 HOURS SCHEDULED
Qty: 28 CAPSULE | Refills: 0 | Status: SHIPPED | OUTPATIENT
Start: 2024-12-09 | End: 2024-12-16

## 2024-12-09 NOTE — PROGRESS NOTES
[facility]        NAME: Amol Montes De Oca is a 74 y.o. male  : 1950    MRN: [id number]  DATE: 2024  TIME: 4:23 PM    Assessment and Plan   Paronychia of finger of right hand [L03.011]  1. Paronychia of finger of right hand  cephalexin (KEFLEX) 500 mg capsule        Patient is in no acute distress and vital signs are stable upon exam.  Discussed with patient paronychia however no purulence to drain.  Discussed supportive care and will give antibiotics as watch and wait.  Patient will start supportive treatment and will only start antibiotics if his symptoms are worsening. Discussed supportive care and return precautions. Patient/Parent agreeable to plan of care and all questions answered. Note for work/school given as needed.      Patient Instructions       Follow up with PCP in 3-5 days.  Proceed to  ER if symptoms worsen.    If tests have been performed at [facility], our office will contact you with results if changes need to be made to the care plan discussed with you at the visit.  You can review your full results on [facility] MyChart.    Chief Complaint     Chief Complaint   Patient presents with    finger problem     Pt awoke this AM with redness and slight swelling of cuticle of right 3rd finger and getting worse         History of Present Illness       Started: this morning  Positive: right 3rd finger pain and swelling around cuticle   Negative: fevers, drainage  Treatment: none        Review of Systems   Review of Systems   Musculoskeletal:  Positive for joint swelling.   Skin:  Positive for color change.   All other systems reviewed and are negative.        Current Medications       Current Outpatient Medications:     acetaminophen (TYLENOL) 325 mg tablet, Take 650 mg by mouth every 6 (six) hours as needed for mild pain, Disp: , Rfl:     atorvastatin (LIPITOR) 20 mg tablet, Take 20 mg by mouth daily at bedtime, Disp: , Rfl:     CALCIUM-VITAMIN D PO, Take 1 capsule by mouth in the  morning, Disp: , Rfl:     cephalexin (KEFLEX) 500 mg capsule, Take 1 capsule (500 mg total) by mouth every 6 (six) hours for 7 days, Disp: 28 capsule, Rfl: 0    clopidogrel (Plavix) 75 mg tablet, Take 1 tablet (75 mg total) by mouth daily Do not start before December 3, 2024., Disp: 90 tablet, Rfl: 0    Eliquis 5 MG, Take 1 tablet (5 mg total) by mouth 2 (two) times a day Do not start before December 3, 2024., Disp: , Rfl:     Empagliflozin (JARDIANCE) 10 MG TABS tablet, Take 1 tablet (10 mg total) by mouth every morning, Disp: 30 tablet, Rfl: 0    indomethacin (INDOCIN) 50 mg capsule, Take 50 mg by mouth if needed (takes for gout pain as needed), Disp: , Rfl:     loratadine (CLARITIN) 10 mg tablet, Take 10 mg by mouth daily, Disp: , Rfl:     meclizine (ANTIVERT) 12.5 MG tablet, Take 1 tablet (12.5 mg total) by mouth every 8 (eight) hours as needed for dizziness, Disp: 10 tablet, Rfl: 0    metoprolol succinate (TOPROL-XL) 25 mg 24 hr tablet, Take 25 mg by mouth daily, Disp: , Rfl:     Multiple Vitamins-Minerals (multivitamin with minerals) tablet, Take 1 tablet by mouth daily, Disp: , Rfl:     sacubitril-valsartan (Entresto) 24-26 MG TABS, Take 1 tablet by mouth 2 (two) times a day, Disp: , Rfl:     spironolactone (ALDACTONE) 25 mg tablet, TAKE 1/2 TABLET BY MOUTH EVERY DAY, Disp: 45 tablet, Rfl: 4    testosterone (ANDROGEL) 25 MG/2.5GM (1%) GEL, Place 25 mg on the skin daily, Disp: , Rfl:     LORazepam (ATIVAN) 0.5 mg tablet, Take 0.5 mg by mouth 2 (two) times a day As needed for MRI (Patient not taking: Reported on 12/9/2024), Disp: , Rfl:     silver sulfadiazine (SILVADENE,SSD) 1 % cream, Apply topically 2 (two) times a day as needed for wound care for up to 7 days (Patient not taking: Reported on 12/9/2024), Disp: 15 g, Rfl: 0    VITAMIN D PO, Take 1 capsule by mouth in the morning, Disp: , Rfl:     Current Allergies     Allergies as of 12/09/2024    (No Known Allergies)            The following portions of the  "patient's history were reviewed and updated as appropriate: allergies, current medications, past family history, past medical history, past social history, past surgical history and problem list.     Past Medical History:   Diagnosis Date    COVID-19     1/2023 recoverd @ home    GERD (gastroesophageal reflux disease)     Gout     Hyperlipidemia     Hypertension     Serum cholesterol elevated        Past Surgical History:   Procedure Laterality Date    CARDIAC CATHETERIZATION Left 7/22/2024    Procedure: Cardiac catheterization;  Surgeon: Elizabeth Dawn DO;  Location: [facility];  Service: Cardiology    CARDIAC CATHETERIZATION N/A 7/22/2024    Procedure: Cardiac Coronary Angiogram;  Surgeon: Elizabeth Dawn DO;  Location: [facility];  Service: Cardiology    CARDIAC CATHETERIZATION N/A 12/2/2024    Procedure: Cardiac pci;  Surgeon: Elizabeth Dawn DO;  Location: [facility];  Service: Cardiology    CARDIAC ELECTROPHYSIOLOGY PROCEDURE N/A 11/8/2024    Procedure: Cardiac biv pacer implant;  Surgeon: Artur Mcfarland MD;  Location: [facility];  Service: Cardiology    COLONOSCOPY      LIPOMA RESECTION Left     Left lower abdomen/groin area    SUBMANDIBULAR GLAND EXCISION Left 6/14/2023    Procedure: SUBMANDIBULAR GLAND EXICISON;  Surgeon: Andrew Hummel MD;  Location: [facility];  Service: ENT       History reviewed. No pertinent family history.      Medications have been verified.        Objective   /73   Pulse 70   Temp (!) 97.4 °F (36.3 °C) (Tympanic)   Resp 18   Ht 6' 2\" (1.88 m)   Wt 100 kg (221 lb)   SpO2 100%   BMI 28.37 kg/m²   No LMP for male patient.       Physical Exam     Physical Exam  Constitutional:       General: He is not in acute distress.     Appearance: Normal appearance. He is not ill-appearing.   HENT:      Head: Normocephalic and atraumatic.   Cardiovascular:      Rate and Rhythm: Normal rate.   Pulmonary:      Effort: Pulmonary " effort is normal.   Musculoskeletal:        Hands:    Skin:     General: Skin is warm and dry.   Neurological:      Mental Status: He is alert.

## 2024-12-09 NOTE — PATIENT INSTRUCTIONS
Tylenol as needed for pain  Warm water soaks 3 times daily for 20 minutes  Push out any drainage if you can gently  If symptoms are not improving or worsening, please start the antibiotics you were prescribed today  Follow up with PCP as needed

## 2024-12-19 ENCOUNTER — OFFICE VISIT (OUTPATIENT)
Dept: CARDIOLOGY CLINIC | Facility: CLINIC | Age: 74
End: 2024-12-19
Payer: COMMERCIAL

## 2024-12-19 VITALS
BODY MASS INDEX: 28.14 KG/M2 | DIASTOLIC BLOOD PRESSURE: 60 MMHG | HEIGHT: 74 IN | OXYGEN SATURATION: 97 % | WEIGHT: 219.3 LBS | SYSTOLIC BLOOD PRESSURE: 108 MMHG | HEART RATE: 92 BPM

## 2024-12-19 DIAGNOSIS — I48.91 ATRIAL FIBRILLATION, UNSPECIFIED TYPE (HCC): ICD-10-CM

## 2024-12-19 DIAGNOSIS — Z95.0 BIVENTRICULAR CARDIAC PACEMAKER IN SITU: ICD-10-CM

## 2024-12-19 DIAGNOSIS — I50.20 HFREF (HEART FAILURE WITH REDUCED EJECTION FRACTION) (HCC): Primary | ICD-10-CM

## 2024-12-19 DIAGNOSIS — I25.10 CORONARY ARTERY DISEASE INVOLVING NATIVE CORONARY ARTERY OF NATIVE HEART WITHOUT ANGINA PECTORIS: ICD-10-CM

## 2024-12-19 DIAGNOSIS — I44.7 LBBB (LEFT BUNDLE BRANCH BLOCK): ICD-10-CM

## 2024-12-19 DIAGNOSIS — Z95.5 S/P CORONARY ARTERY STENT PLACEMENT: ICD-10-CM

## 2024-12-19 PROCEDURE — 99214 OFFICE O/P EST MOD 30 MIN: CPT | Performed by: INTERNAL MEDICINE

## 2024-12-19 PROCEDURE — G2211 COMPLEX E/M VISIT ADD ON: HCPCS | Performed by: INTERNAL MEDICINE

## 2024-12-19 NOTE — PATIENT INSTRUCTIONS
Continue current medications  2g sodium diet  1500-1800mL fluid diet  Daily weights, call office for weight gain of 3 lbs in a day or 5 lbs in 5-7 days.  Obtain echocardiogram in 3 months to reassess heart function  Cardiac rehab

## 2024-12-19 NOTE — PROGRESS NOTES
Advanced Heart Failure Outpatient Progress Note - Amol Montes De Oca 74 y.o. male MRN: [id number]    Encounter: [id number]      Assessment/Plan:    Patient Active Problem List    Diagnosis Date Noted    S/P coronary artery stent placement 12/02/2024    Cardiomyopathy, unspecified ([facility]) 11/08/2024    Status post placement of cardiac pacemaker 11/08/2024    GERD (gastroesophageal reflux disease)     Gout     Dizziness 08/21/2024    Atrial fibrillation, unspecified type ([facility]) 07/25/2024    Coronary artery disease involving native coronary artery of native heart with angina pectoris ([facility]) 07/22/2024    HFrEF (heart failure with reduced ejection fraction) ([facility]) 07/10/2024    Hypertension     Hyperlipidemia        # Heart failure with reduced EF, 30-35% per documentation, no imaging or report available for review  Etiology: Ischemic and nonischemic components.  Severe stenosis of proximal LAD on cardiac catheterization. Degree of reported cardiomyopathy out of proportion to CAD.  Paroxysmal atrial fibrillation on Zio.  Dyssynchrony with wide LBBB    Studies- personally reviewed by me    Echocardiogram 10/18/24  LVEF: 40%  LVIDd: 4.7cm  RV: Normal RV size and systolic function  AV: Mild stenosis, mean gradient 12 mmHg.  MR: None seen  PASP: 35 mmHg, trace TR, estimated RAP 3 mmHg  RVOT: No notching  Other: No pericardial effusion    Echo 7/2024: LVEF 30-35% per documentation    [facility] 10/18/24: Status post IVUS guided PCI with ROBERT x 1 to the proximal LAD stenosis  [facility] 7/22/24: 80% proximal LAD stenosis.  Left main normal.  Mild diffuse disease in the left circumflex and RCA    Neurohormonal Blockade:  --Beta-Blocker: metoprolol succinate 25mg daily  --ACEi, ARB or ARNi: entresto 24-26mg BID  --Aldosterone Receptor Blocker: spironolactone 12.5mg daily  --SGLT2 Inhibitor: Jardiance 10mg daily  --Diuretic: none    Sudden Cardiac Death Risk Reduction:  --ICD: assess for improvement with medical therapy, CRT and PCI of  "LAD    Electrical Resynchronization:  --Candidacy for BiV device: LBBB, QRSd 166-170msec s/p CRT 11/8/24   msec on EKG 12/2/24    # LBBB, QRSd 166-170 msec  S/p BIV PPM implant 11/8/24   msec on EKG 12/2/24    # PACs, PVCs  Low burden on 1 week holter 7/1/24  Metoprolol as above    # Paroxysmal atrial fibrillation   30% burden on 1 week holter 7/1/24  Continue with Toprol for rate control  Continue anticoagulation with Eliquis  2-week ZIO 8/6/24: Patient had a min HR of 45 bpm, max HR of 130 bpm, and avg HR of 66 bpm. Predominant underlying rhythm was Sinus Rhythm. Bundle Branch Block/IVCD was present. 1 run of Ventricular Tachycardia occurred lasting 6 beats with a max rate of 130 bpm (avg 116 bpm). 260 Supraventricular Tachycardia runs occurred, the run with the fastest interval lasting 7 beats with a max rate of 126 bpm, the longest lasting 20.4 secs with an avg rate of 103 bpm. Isolated SVEs were frequent (9.0%, 70391), SVE Couplets were rare (<1.0%, 404), and SVE Triplets were rare (<1.0%, 93). Isolated VEs were rare (<1.0%, 3800), VE Couplets were rare (<1.0%, 524), and VE Triplets were rare (<1.0%, 141  \"Reviewed strips and agree. DIfficult to tell if afib, P waves are small but rhythm is regular.  He has plan for BIV ICD and is on anticoagulation. NO changes recommended. \" Per Dr. Mcfarland.    # Coronary artery disease, 80% stenosis of proximal LAD s/p IVUS guided PCI with ROBERT 12/2/24  Continue dual antithrombotic therapy with Plavix and apixaban.  Minimum of 6 months of therapy with Plavix  No anginal symptoms    # Vertigo      Today's Plan:  Continue current medications  2g sodium diet  1500-1800mL fluid diet  Daily weights, call office for weight gain of 3 lbs in a day or 5 lbs in 5-7 days.  Obtain echocardiogram in 3 months to reassess heart function post PCI and CRT  Cardiac rehab      HPI:   7/25/2024: 74-year-old male with past medical history of hypertension, hyperlipidemia, gout who is " referred by Dr. Mcfarland for heart failure evaluation.  Patient was undergoing regular 6-month follow-up with PCP about 3 to 4 weeks ago when he was noted to have abnormal EKG, and possible A-fib.  Also had an echocardiogram which reportedly had a reduced EF at 30 to 35%.  ARB was switched to Entresto and metoprolol was added.  He was also started on anticoagulation with Eliquis.  He was seen by Dr. Mcfarland on July 10 for evaluation.  Office EKG noted to have normal sinus rhythm with premature atrial contractions and left bundle branch block.  Patient had a 1 week Zio prior to that office visit with concern for possible atrial fibrillation but report not available for review at that time.  Cardiac catheterization was ordered to rule out ischemic cardiomyopathy, referred to heart failure for further evaluation and management and 2-week Zio ordered.  1 week ZIO on  showed predominantly normal sinus rhythm with AF burden of 30%.  PVC burden 4.6%.  PAC burden 5.2%.  Average heart rate during normal sinus rhythm 70 bpm, 77 bpm during atrial fibrillation.  He had a cardiac catheterization on 2024 which showed 80% proximal LAD stenosis.  Interventional cardiology recommended CTS evaluation for CABG/RACHEL ligation and possible viability study.   He is currently completing the 2-week heart monitor.  Patient reports overall stable exertional capacity leading up to routine follow-up with PCP.  He denies leg swelling, PND orthopnea.  He is able to walk 2 flights of stairs and would need to catch his breath for short period.  Denies palpitations or lightheadedness.  He has intermittent of dizziness due to vertigo.  No syncope.  FH: father  young at 62, had open heart surgery and also had cancer at that time   SH: No smoking or drug use, alcohol intake of 1 drink a day, recently discontinued    9/10/2024: Patient is here for follow-up.  Started Jardiance on last visit.  Repeat echo still overdue.  Cardiac MRI  "canceled by patient per documentation.  Admitted 8/21/2024 for dizziness.  CTA of the head was unremarkable.  Neurology was consulted and patient underwent brain MRI without any intracranial abnormalities.  Patient discharged home on meclizine as needed for peripheral vertigo.  8/22/2024 sodium 143 potassium 3.7 creatinine 1.1. Home sleep study with PCP - \"very mild sleep apnea\". Denies chest pain or shortness of breath on current level of exertion. No leg swelling, PND or orthopnea.       12/19/24: here for follow up.  Status post BiV pacer implant and IVUS guided PCI with ROBERT to the proximal LAD.  He has been reporting intermittent episodes of transient dizziness or lightheadedness also notable with standing up.  Also with concentrated/dark urine and wakes up in the morning with dry mouth.  He drinks 2 cups of coffee a day and a glass of water here and there with meals.  He has been having these episodes before device implant.  Device interrogation with no arrhythmias noted.  Also reporting probable mild breast enlargement.  Denies any breast tenderness or pain.  He has looking up potential side effects of medications and may be overthinking them.  Advised to continue to monitor and and can stop spironolactone if breast symptoms confirmed.       Past Medical History:   Diagnosis Date    COVID-19 1/2023 recoverd @ home    GERD (gastroesophageal reflux disease)     Gout     Hyperlipidemia     Hypertension     Serum cholesterol elevated        Review of Systems   Constitutional:  Negative for chills, fatigue and fever.   HENT:  Negative for ear pain and sore throat.    Eyes:  Negative for pain and visual disturbance.   Respiratory:  Negative for cough, chest tightness and shortness of breath.    Cardiovascular:  Negative for chest pain, palpitations and leg swelling.   Gastrointestinal:  Negative for abdominal distention, abdominal pain and vomiting.   Genitourinary:  Negative for dysuria and hematuria. "   Musculoskeletal:  Negative for arthralgias and back pain.   Skin:  Negative for color change and rash.   Neurological:  Positive for dizziness. Negative for seizures, syncope and light-headedness.   All other systems reviewed and are negative.       No Known Allergies  .    Current Outpatient Medications:     acetaminophen (TYLENOL) 325 mg tablet, Take 650 mg by mouth every 6 (six) hours as needed for mild pain, Disp: , Rfl:     atorvastatin (LIPITOR) 20 mg tablet, Take 20 mg by mouth daily at bedtime, Disp: , Rfl:     CALCIUM-VITAMIN D PO, Take 1 capsule by mouth in the morning, Disp: , Rfl:     clopidogrel (Plavix) 75 mg tablet, Take 1 tablet (75 mg total) by mouth daily Do not start before December 3, 2024., Disp: 90 tablet, Rfl: 0    Eliquis 5 MG, Take 1 tablet (5 mg total) by mouth 2 (two) times a day Do not start before December 3, 2024., Disp: , Rfl:     Empagliflozin (JARDIANCE) 10 MG TABS tablet, Take 1 tablet (10 mg total) by mouth every morning, Disp: 30 tablet, Rfl: 0    indomethacin (INDOCIN) 50 mg capsule, Take 50 mg by mouth if needed (takes for gout pain as needed), Disp: , Rfl:     loratadine (CLARITIN) 10 mg tablet, Take 10 mg by mouth daily, Disp: , Rfl:     meclizine (ANTIVERT) 12.5 MG tablet, Take 1 tablet (12.5 mg total) by mouth every 8 (eight) hours as needed for dizziness, Disp: 10 tablet, Rfl: 0    metoprolol succinate (TOPROL-XL) 25 mg 24 hr tablet, Take 25 mg by mouth daily, Disp: , Rfl:     Multiple Vitamins-Minerals (multivitamin with minerals) tablet, Take 1 tablet by mouth daily, Disp: , Rfl:     sacubitril-valsartan (Entresto) 24-26 MG TABS, Take 1 tablet by mouth 2 (two) times a day, Disp: , Rfl:     spironolactone (ALDACTONE) 25 mg tablet, TAKE 1/2 TABLET BY MOUTH EVERY DAY, Disp: 45 tablet, Rfl: 4    testosterone (ANDROGEL) 25 MG/2.5GM (1%) GEL, Place 25 mg on the skin daily, Disp: , Rfl:     VITAMIN D PO, Take 1 capsule by mouth in the morning, Disp: , Rfl:     LORazepam  "(ATIVAN) 0.5 mg tablet, Take 0.5 mg by mouth 2 (two) times a day As needed for MRI (Patient not taking: Reported on 12/9/2024), Disp: , Rfl:     Social History     Socioeconomic History    Marital status: /Civil Union     Spouse name: Not on file    Number of children: Not on file    Years of education: Not on file    Highest education level: Not on file   Occupational History    Not on file   Tobacco Use    Smoking status: Never    Smokeless tobacco: Never    Tobacco comments:     No smoking in the home   Vaping Use    Vaping status: Never Used   Substance and Sexual Activity    Alcohol use: Not Currently     Comment: 1 drink a day. varies beer wine or liqour 6/12    Drug use: Never    Sexual activity: Not on file   Other Topics Concern    Not on file   Social History Narrative    Not on file     Social Drivers of Health     Financial Resource Strain: Not on file   Food Insecurity: Not on file   Transportation Needs: Not on file   Physical Activity: Not on file   Stress: Not on file   Social Connections: Not on file   Intimate Partner Violence: Not on file   Housing Stability: Not on file       No family history on file.    Physical Exam:    Vitals: Blood pressure 108/60, pulse 92, height 6' 2\" (1.88 m), weight 99.5 kg (219 lb 4.8 oz), SpO2 97%., Body mass index is 28.16 kg/m².,   Wt Readings from Last 3 Encounters:   12/19/24 99.5 kg (219 lb 4.8 oz)   12/09/24 100 kg (221 lb)   12/02/24 97.5 kg (215 lb)       Physical Exam  Constitutional:       General: He is not in acute distress.     Appearance: Normal appearance.   HENT:      Head: Normocephalic and atraumatic.      Mouth/Throat:      Mouth: Mucous membranes are moist.   Eyes:      General: No scleral icterus.     Extraocular Movements: Extraocular movements intact.   Neck:      Vascular: No JVD.   Cardiovascular:      Rate and Rhythm: Normal rate and regular rhythm.      Pulses: Normal pulses.      Heart sounds: S1 normal and S2 normal. No murmur " "heard.     No friction rub. No gallop.   Pulmonary:      Breath sounds: Normal breath sounds.   Abdominal:      General: There is no distension.      Palpations: Abdomen is soft.      Tenderness: There is no abdominal tenderness. There is no guarding or rebound.   Musculoskeletal:         General: Normal range of motion.      Cervical back: Neck supple.      Right lower leg: No edema.      Left lower leg: No edema.   Skin:     General: Skin is warm and dry.      Capillary Refill: Capillary refill takes less than 2 seconds.   Neurological:      General: No focal deficit present.      Mental Status: He is alert and oriented to person, place, and time.   Psychiatric:         Mood and Affect: Mood normal.         Labs & Results:    Lab Results   Component Value Date    WBC 8.11 11/18/2024    HGB 18.9 (H) 11/18/2024    HCT 59.4 (H) 11/18/2024    MCV 85 11/18/2024     11/18/2024     Lab Results   Component Value Date    SODIUM 142 12/03/2024    K 3.5 12/03/2024     12/03/2024    CO2 31 12/03/2024    BUN 30 (H) 12/03/2024    CREATININE 0.92 12/03/2024    GLUC 104 11/08/2024    CALCIUM 9.5 12/03/2024     No results found for: \"NTBNP\"   Lab Results   Component Value Date    CHOLESTEROL 109 08/22/2024    CHOLESTEROL 153 06/03/2024    CHOLESTEROL 197 11/27/2023     Lab Results   Component Value Date    HDL 36 (L) 08/22/2024    HDL 51 06/03/2024    HDL 56 11/27/2023     Lab Results   Component Value Date    TRIG 63 08/22/2024    TRIG 72 06/03/2024    TRIG 114 11/27/2023     Lab Results   Component Value Date    NONHDLC 102 06/03/2024    NONHDLC 141 11/27/2023    NONHDLC 107 12/14/2022       EKG personally reviewed by Tequila Palma MD.       Thank you for the opportunity to participate in the care of this patient.    TEQUILA PALMA MD  ADVANCED HEART FAILURE AND MECHANICAL CIRCULATORY SUPPORT  [facility]     "

## 2025-01-20 DIAGNOSIS — Z95.5 S/P CORONARY ARTERY STENT PLACEMENT: Primary | ICD-10-CM

## 2025-01-20 DIAGNOSIS — I25.10 CORONARY ARTERY DISEASE INVOLVING NATIVE CORONARY ARTERY OF NATIVE HEART WITHOUT ANGINA PECTORIS: ICD-10-CM

## 2025-01-22 ENCOUNTER — CLINICAL SUPPORT (OUTPATIENT)
Dept: CARDIAC REHAB | Facility: CLINIC | Age: 75
End: 2025-01-22

## 2025-01-22 DIAGNOSIS — I25.10 CORONARY ARTERY DISEASE INVOLVING NATIVE CORONARY ARTERY OF NATIVE HEART WITHOUT ANGINA PECTORIS: ICD-10-CM

## 2025-01-22 DIAGNOSIS — Z95.5 S/P PRIMARY ANGIOPLASTY WITH CORONARY STENT: Primary | ICD-10-CM

## 2025-01-22 DIAGNOSIS — Z95.5 S/P CORONARY ARTERY STENT PLACEMENT: ICD-10-CM

## 2025-01-22 NOTE — PROGRESS NOTES
Amol arrived today for his initial cardiac rehab evaluation. After discussion about what cardiac rehab entails, he has decided to continue exercise at home without further cardiac rehab. He is currently walking 3 days/week 1 hour in the mall or outside in the park[place] (approx 180 min/week), weight lifting at home 2 days/week - 40-50 lb OH press, bicep curls, wrist curls, squats, and DB lateral raises. He also uses the stairs for physical activity and exercise at home. Amol was also provided with written education on understanding heart disease, risk factor reduction, home exercise guidelines, heart healthy eating, common cardiac medication and stress management.

## 2025-02-24 ENCOUNTER — TELEPHONE (OUTPATIENT)
Age: 75
End: 2025-02-24

## 2025-02-24 DIAGNOSIS — Z95.5 S/P CORONARY ARTERY STENT PLACEMENT: Primary | ICD-10-CM

## 2025-02-24 DIAGNOSIS — R31.9 HEMATURIA, UNSPECIFIED TYPE: ICD-10-CM

## 2025-02-24 NOTE — TELEPHONE ENCOUNTER
Spoke with pt. He states the dark urine does not happen everyday.  He can go 1-2 days without any issues.  He did say he exercises, walking 3 times a week for 60-70 mins, ending the walk at a fast pace and lifts wts at home.    The urine stream at times will be brown. He did have a quick second were it was black and then brown and back to clear. Otherwise he feels fine.    He will get labs done tomorrow.

## 2025-02-24 NOTE — TELEPHONE ENCOUNTER
Patient Amol [phone] established patient of Dr. Palma contacting office experiencing faint blood in urine switching over to dark brown.      Patient denies any pain w/urination however, did mentioned he had food last evening that did not agree with him.    Patient also mentioned when he had his stent placed, he was advised to take 8 tablets of plavix all @ once.  After he took all 8 tablets he experienced the same above symptoms.

## 2025-02-24 NOTE — TELEPHONE ENCOUNTER
Please contact patient and obtain more information. He had 80% stenosis of proximal LAD s/p IVUS guided PCI with ROBERT 12/2/24 on therapy with plavix and eliquis. Obtain CMP, CBC and UA

## 2025-02-25 ENCOUNTER — APPOINTMENT (OUTPATIENT)
Dept: LAB | Facility: CLINIC | Age: 75
End: 2025-02-25
Payer: COMMERCIAL

## 2025-02-25 ENCOUNTER — IN-CLINIC DEVICE VISIT (OUTPATIENT)
Dept: CARDIOLOGY CLINIC | Facility: CLINIC | Age: 75
End: 2025-02-25
Payer: COMMERCIAL

## 2025-02-25 ENCOUNTER — RESULTS FOLLOW-UP (OUTPATIENT)
Dept: CARDIOLOGY CLINIC | Facility: CLINIC | Age: 75
End: 2025-02-25

## 2025-02-25 DIAGNOSIS — R89.1 ABNORMAL LEVEL OF HORMONES IN SPECIMENS FROM OTH ORG/TISS: ICD-10-CM

## 2025-02-25 DIAGNOSIS — Z95.5 S/P CORONARY ARTERY STENT PLACEMENT: ICD-10-CM

## 2025-02-25 DIAGNOSIS — R97.20 ELEVATED PROSTATE SPECIFIC ANTIGEN (PSA): ICD-10-CM

## 2025-02-25 DIAGNOSIS — R31.9 HEMATURIA, UNSPECIFIED TYPE: ICD-10-CM

## 2025-02-25 DIAGNOSIS — Z95.0 CARDIAC PACEMAKER IN SITU: Primary | ICD-10-CM

## 2025-02-25 LAB
ALBUMIN SERPL BCG-MCNC: 4.1 G/DL (ref 3.5–5)
ALP SERPL-CCNC: 70 U/L (ref 34–104)
ALT SERPL W P-5'-P-CCNC: 17 U/L (ref 7–52)
ANION GAP SERPL CALCULATED.3IONS-SCNC: 10 MMOL/L (ref 4–13)
AST SERPL W P-5'-P-CCNC: 19 U/L (ref 13–39)
BACTERIA UR QL AUTO: ABNORMAL /HPF
BILIRUB SERPL-MCNC: 1.09 MG/DL (ref 0.2–1)
BILIRUB UR QL STRIP: NEGATIVE
BUN SERPL-MCNC: 22 MG/DL (ref 5–25)
CALCIUM SERPL-MCNC: 10.1 MG/DL (ref 8.4–10.2)
CHLORIDE SERPL-SCNC: 102 MMOL/L (ref 96–108)
CLARITY UR: ABNORMAL
CO2 SERPL-SCNC: 30 MMOL/L (ref 21–32)
COLOR UR: YELLOW
CREAT SERPL-MCNC: 1.05 MG/DL (ref 0.6–1.3)
ERYTHROCYTE [DISTWIDTH] IN BLOOD BY AUTOMATED COUNT: 18.1 % (ref 11.6–15.1)
GFR SERPL CREATININE-BSD FRML MDRD: 68 ML/MIN/1.73SQ M
GLUCOSE P FAST SERPL-MCNC: 99 MG/DL (ref 65–99)
GLUCOSE UR STRIP-MCNC: ABNORMAL MG/DL
HCT VFR BLD AUTO: 55.2 % (ref 36.5–49.3)
HGB BLD-MCNC: 17.9 G/DL (ref 12–17)
HGB UR QL STRIP.AUTO: ABNORMAL
KETONES UR STRIP-MCNC: NEGATIVE MG/DL
LEUKOCYTE ESTERASE UR QL STRIP: ABNORMAL
MCH RBC QN AUTO: 28.8 PG (ref 26.8–34.3)
MCHC RBC AUTO-ENTMCNC: 32.4 G/DL (ref 31.4–37.4)
MCV RBC AUTO: 89 FL (ref 82–98)
NITRITE UR QL STRIP: NEGATIVE
NON-SQ EPI CELLS URNS QL MICRO: ABNORMAL /HPF
PH UR STRIP.AUTO: 5.5 [PH]
PLATELET # BLD AUTO: 199 THOUSANDS/UL (ref 149–390)
PMV BLD AUTO: 11 FL (ref 8.9–12.7)
POTASSIUM SERPL-SCNC: 4 MMOL/L (ref 3.5–5.3)
PROT SERPL-MCNC: 6.8 G/DL (ref 6.4–8.4)
PROT UR STRIP-MCNC: ABNORMAL MG/DL
PSA SERPL-MCNC: 0.54 NG/ML (ref 0–4)
RBC # BLD AUTO: 6.22 MILLION/UL (ref 3.88–5.62)
RBC #/AREA URNS AUTO: ABNORMAL /HPF
SODIUM SERPL-SCNC: 142 MMOL/L (ref 135–147)
SP GR UR STRIP.AUTO: 1.02 (ref 1–1.03)
TESTOST SERPL-MSCNC: 1127 NG/DL (ref 175–781)
UROBILINOGEN UR STRIP-ACNC: <2 MG/DL
WBC # BLD AUTO: 8.26 THOUSAND/UL (ref 4.31–10.16)
WBC #/AREA URNS AUTO: ABNORMAL /HPF

## 2025-02-25 PROCEDURE — G0103 PSA SCREENING: HCPCS

## 2025-02-25 PROCEDURE — 36415 COLL VENOUS BLD VENIPUNCTURE: CPT

## 2025-02-25 PROCEDURE — 93280 PM DEVICE PROGR EVAL DUAL: CPT | Performed by: INTERNAL MEDICINE

## 2025-02-25 PROCEDURE — 80053 COMPREHEN METABOLIC PANEL: CPT

## 2025-02-25 PROCEDURE — 81001 URINALYSIS AUTO W/SCOPE: CPT

## 2025-02-25 PROCEDURE — 84403 ASSAY OF TOTAL TESTOSTERONE: CPT

## 2025-02-25 PROCEDURE — 85027 COMPLETE CBC AUTOMATED: CPT

## 2025-02-25 NOTE — PROGRESS NOTES
Results for orders placed or performed in visit on 02/25/25   Cardiac EP device report    Narrative    MDT DC/PM-ACTIVE SYSTEM IS MRI CONDITIONAL  DEVICE INTERROGATED IN THE [facility] OFFICE: PRESENTING EGRAM AS @ 72 BPM. BATTERY VOLTAGE ADEQUATE-12 YRS. AP 64%  90%. ALL AVAILABLE LEAD PARAMETERS WITHIN NORMAL LIMITS. 1 BRIEF NSVT NOTED; APPROX 7 BEATS@ 200 BPM. PT ON METO SUCC & EF 40% (ECHO 2024). NO PROGRAMMING CHANGES MADE TO DEVICE PARAMETERS. NORMAL DEVICE FUNCTION. NC

## 2025-02-27 ENCOUNTER — TELEPHONE (OUTPATIENT)
Age: 75
End: 2025-02-27

## 2025-02-27 NOTE — TELEPHONE ENCOUNTER
New Patient    Appointment Scheduling  What office location does the patient prefer?: [facility]  What is the reason for the patient's appointment?: micro hematuria and hypogonadism  Have patient records been requested?: in epic   If No, are the records showing in Epic:     Appointment Details  Date: 3/10/2025  Time:   10am   Location:  [facility]  Provider:  Lora Montes De Oca  Does the appointment need further review? (Reason)      HISTORY  Is the patient having active symptoms? If so, describe symptoms:   Has the patient had any previous Urologist(s)?: no  Was the patient seen in the ED?: no  Has the patient had any outside testing done?: no  Does patient have Imaging/Lab Results: urine testing, pt scheduled for US on 3/3/25  Does the patient have a personal history of any cancer?: no    INSURANCE   Have you confirmed Patient's insurance? yes  Is the insurance accepted?  yes  Is the insurance active? yes

## 2025-03-02 DIAGNOSIS — I25.10 CORONARY ARTERY DISEASE INVOLVING NATIVE CORONARY ARTERY OF NATIVE HEART WITHOUT ANGINA PECTORIS: ICD-10-CM

## 2025-03-02 DIAGNOSIS — Z95.5 S/P CORONARY ARTERY STENT PLACEMENT: ICD-10-CM

## 2025-03-02 DIAGNOSIS — I25.119 CORONARY ARTERY DISEASE INVOLVING NATIVE CORONARY ARTERY OF NATIVE HEART WITH ANGINA PECTORIS (HCC): ICD-10-CM

## 2025-03-05 RX ORDER — CLOPIDOGREL BISULFATE 75 MG/1
75 TABLET ORAL DAILY
Qty: 90 TABLET | Refills: 1 | Status: SHIPPED | OUTPATIENT
Start: 2025-03-05

## 2025-03-12 ENCOUNTER — HOSPITAL ENCOUNTER (OUTPATIENT)
Dept: ULTRASOUND IMAGING | Facility: HOSPITAL | Age: 75
Discharge: HOME | End: 2025-03-12
Payer: COMMERCIAL

## 2025-03-12 DIAGNOSIS — R31.9 HEMATURIA SYNDROME: ICD-10-CM

## 2025-03-12 PROCEDURE — 76775 US EXAM ABDO BACK WALL LIM: CPT

## 2025-03-19 ENCOUNTER — HOSPITAL ENCOUNTER (OUTPATIENT)
Dept: NON INVASIVE DIAGNOSTICS | Facility: HOSPITAL | Age: 75
Discharge: HOME | End: 2025-03-19
Attending: INTERNAL MEDICINE
Payer: COMMERCIAL

## 2025-03-19 ENCOUNTER — TELEPHONE (OUTPATIENT)
Age: 75
End: 2025-03-19

## 2025-03-19 VITALS
SYSTOLIC BLOOD PRESSURE: 113 MMHG | HEART RATE: 82 BPM | WEIGHT: 219 LBS | HEIGHT: 74 IN | BODY MASS INDEX: 28.11 KG/M2 | DIASTOLIC BLOOD PRESSURE: 63 MMHG

## 2025-03-19 DIAGNOSIS — I50.20 HFREF (HEART FAILURE WITH REDUCED EJECTION FRACTION) (HCC): ICD-10-CM

## 2025-03-19 PROCEDURE — 93308 TTE F-UP OR LMTD: CPT

## 2025-03-19 PROCEDURE — 93308 TTE F-UP OR LMTD: CPT | Performed by: INTERNAL MEDICINE

## 2025-03-19 NOTE — TELEPHONE ENCOUNTER
He is known to have episodes of skipped beats from the top and bottom chamber of the heart. We will await results of echocardiogram.

## 2025-03-19 NOTE — TELEPHONE ENCOUNTER
Patient Amol [phone] established patient of Dr. Palma, contacting [facility] to advise he went for his echocardiogram today and was advised by the technician he has extra beats.    Patient requesting Dr. Palma contact him back to discuss.     Patient also mentioned, he has a cold and is taking cold medication, not sure if this was a contributing factor.

## 2025-03-20 NOTE — PROGRESS NOTES
3/21/2025      Chief Complaint   Patient presents with   • Gross Hematuria         Assessment and Plan    74 y.o. male managed by New patient     1. Gross hematuria  Assessment & Plan:  Microscopic hematuria and gross hematuria- on and off for months   UA 2/25-RBCs innumerable  No infection present  Smoking history-denies   Chemical exposure-denies   Family history-no   Reviewed AUA recommendations of proceeding with cystoscopy and CT urogram to evaluate cause of gross hematuria  Patient would like to defer as he feels that it is related to his Plavix and Eliquis  Discussed that this may be the cause however it would still be recommended to proceed with imaging and cystoscopy to rule out urological malignancy  He did have renal ultrasound which was reviewed with the patient-no masses or lesions seen, nonobstructing stone visualized  He plans to call the office if he wishes to proceed  Otherwise follow-up as needed  2. Microscopic hematuria      History of Present Illness  Amol Montes De Oca is a 74 y.o. male here for evaluation of microscopic hematuria.  Urinalysis which tract 2/25 which showed innumerable red blood cells.  Patient additionally reports gross hematuria.  He reports that this is intermittent and has been on and off for months.  He additionally reports starting Plavix about 7 months ago for cardiac stent.  He is additionally on Eliquis.  Denies any other urinary tract symptoms.  No flank pain.  Denies a history of nephrolithiasis or urinary tract infection.  No chemical exposure or smoking history.  No family history of urological malignancy.      Lab Results   Component Value Date    PSA 0.543 02/25/2025    PSA 0.45 06/03/2024    PSA 0.5 12/14/2022         Review of Systems   Constitutional:  Negative for chills and fever.   HENT:  Negative for ear pain and sore throat.    Eyes:  Negative for pain and visual disturbance.   Respiratory:  Negative for cough and shortness of breath.    Cardiovascular:   "Negative for chest pain and palpitations.   Gastrointestinal:  Negative for abdominal pain and vomiting.   Genitourinary:  Positive for hematuria (intermittent- brownish). Negative for decreased urine volume, difficulty urinating, dysuria, flank pain and urgency.   Musculoskeletal:  Negative for arthralgias and back pain.   Skin:  Negative for color change and rash.   Neurological:  Negative for seizures and syncope.   All other systems reviewed and are negative.               Vitals  Vitals:    03/21/25 1342   BP: 118/56   BP Location: Left arm   Patient Position: Sitting   Cuff Size: Standard   Pulse: 73   SpO2: 95%   Weight: 93.9 kg (207 lb)   Height: 6' 2\" (1.88 m)       Physical Exam  Vitals reviewed.   Constitutional:       Appearance: Normal appearance.   HENT:      Head: Normocephalic and atraumatic.   Eyes:      Conjunctiva/sclera: Conjunctivae normal.   Pulmonary:      Effort: Pulmonary effort is normal.   Abdominal:      General: Abdomen is flat. There is no distension.      Palpations: Abdomen is soft.      Tenderness: There is no abdominal tenderness.   Musculoskeletal:         General: Normal range of motion.      Cervical back: Normal range of motion.   Skin:     General: Skin is warm and dry.   Neurological:      General: No focal deficit present.      Mental Status: He is alert and oriented to person, place, and time.   Psychiatric:         Mood and Affect: Mood normal.         Behavior: Behavior normal.         Thought Content: Thought content normal.         Judgment: Judgment normal.           Past History  Past Medical History:   Diagnosis Date   • COVID-19     1/2023 recoverd @ home   • GERD (gastroesophageal reflux disease)    • Gout    • Hyperlipidemia    • Hypertension    • Serum cholesterol elevated      Social History     Socioeconomic History   • Marital status: /Civil Union     Spouse name: None   • Number of children: None   • Years of education: None   • Highest education level: " None   Occupational History   • None   Tobacco Use   • Smoking status: Never   • Smokeless tobacco: Never   • Tobacco comments:     No smoking in the home   Vaping Use   • Vaping status: Never Used   Substance and Sexual Activity   • Alcohol use: Not Currently     Comment: 1 drink a day. varies beer wine or liqour 6/12   • Drug use: Never   • Sexual activity: None   Other Topics Concern   • None   Social History Narrative   • None     Social Drivers of Health     Financial Resource Strain: Not on file   Food Insecurity: Not on file   Transportation Needs: Not on file   Physical Activity: Not on file   Stress: Not on file   Social Connections: Not on file   Intimate Partner Violence: Not on file   Housing Stability: Not on file     Social History     Tobacco Use   Smoking Status Never   Smokeless Tobacco Never   Tobacco Comments    No smoking in the home     History reviewed. No pertinent family history.    The following portions of the patient's history were reviewed and updated as appropriate: allergies, current medications, past medical history, past social history, past surgical history and problem list.    Results  No results found for this or any previous visit (from the past hour).]  Lab Results   Component Value Date    PSA 0.543 02/25/2025    PSA 0.45 06/03/2024    PSA 0.5 12/14/2022    PSA 0.8 05/10/2022     Lab Results   Component Value Date    CALCIUM 10.1 02/25/2025    K 4.0 02/25/2025    CO2 30 02/25/2025     02/25/2025    BUN 22 02/25/2025    CREATININE 1.05 02/25/2025     Lab Results   Component Value Date    WBC 8.26 02/25/2025    HGB 17.9 (H) 02/25/2025    HCT 55.2 (H) 02/25/2025    MCV 89 02/25/2025     02/25/2025

## 2025-03-21 ENCOUNTER — OFFICE VISIT (OUTPATIENT)
Dept: UROLOGY | Facility: MEDICAL CENTER | Age: 75
End: 2025-03-21
Payer: COMMERCIAL

## 2025-03-21 VITALS
DIASTOLIC BLOOD PRESSURE: 56 MMHG | SYSTOLIC BLOOD PRESSURE: 118 MMHG | WEIGHT: 207 LBS | HEART RATE: 73 BPM | BODY MASS INDEX: 26.56 KG/M2 | HEIGHT: 74 IN | OXYGEN SATURATION: 95 %

## 2025-03-21 DIAGNOSIS — R31.0 GROSS HEMATURIA: Primary | ICD-10-CM

## 2025-03-21 DIAGNOSIS — R31.29 MICROSCOPIC HEMATURIA: ICD-10-CM

## 2025-03-21 PROBLEM — R79.89 LOW TESTOSTERONE: Status: ACTIVE | Noted: 2025-03-21

## 2025-03-21 LAB
AORTIC ROOT: 3.2 CM
AORTIC VALVE MEAN VELOCITY: 14.5 M/S
ASCENDING AORTA: 3.3 CM
AV AREA BY CONTINUOUS VTI: 1.9 CM2
AV AREA PEAK VELOCITY: 1.8 CM2
AV LVOT MEAN GRADIENT: 3 MMHG
AV LVOT PEAK GRADIENT: 5 MMHG
AV MEAN PRESS GRAD SYS DOP V1V2: 10 MMHG
AV ORIFICE AREA US: 1.89 CM2
AV PEAK GRADIENT: 15 MMHG
AV VELOCITY RATIO: 0.6
AV VMAX SYS DOP: 1.93 M/S
BSA FOR ECHO PROCEDURE: 2.26 M2
DOP CALC AO VTI: 38.8 CM
DOP CALC LVOT AREA: 3.14 CM2
DOP CALC LVOT CARDIAC INDEX: 2.5 L/MIN/M2
DOP CALC LVOT CARDIAC OUTPUT: 5.66 L/MIN
DOP CALC LVOT DIAMETER: 2 CM
DOP CALC LVOT PEAK VEL VTI: 23.32 CM
DOP CALC LVOT PEAK VEL: 1.13 M/S
DOP CALC LVOT STROKE INDEX: 33.2 ML/M2
DOP CALC LVOT STROKE VOLUME: 73.22
FRACTIONAL SHORTENING: 23 (ref 28–44)
INTERVENTRICULAR SEPTUM IN DIASTOLE (PARASTERNAL SHORT AXIS VIEW): 1.4 CM
INTERVENTRICULAR SEPTUM: 1.4 CM (ref 0.6–1.1)
LAAS-AP2: 18.1 CM2
LAAS-AP4: 14.3 CM2
LEFT ATRIUM SIZE: 3.6 CM
LEFT ATRIUM VOLUME (MOD BIPLANE): 44 ML
LEFT ATRIUM VOLUME INDEX (MOD BIPLANE): 19.5 ML/M2
LEFT INTERNAL DIMENSION IN SYSTOLE: 3.6 CM (ref 2.1–4)
LEFT VENTRICULAR INTERNAL DIMENSION IN DIASTOLE: 4.7 CM (ref 3.5–6)
LEFT VENTRICULAR POSTERIOR WALL IN END DIASTOLE: 1 CM
LEFT VENTRICULAR STROKE VOLUME: 49 ML
LV EF US.2D.A4C+ESTIMATED: 49 %
LVSV (TEICH): 49 ML
MV E'TISSUE VEL-SEP: 11 CM/S
RIGHT ATRIAL 2D VOLUME: 32 ML
RIGHT ATRIUM AREA SYSTOLE A4C: 14.1 CM2
RIGHT VENTRICLE ID DIMENSION: 3.3 CM
SL CV LEFT ATRIUM LENGTH A2C: 4.9 CM
SL CV LV EF: 52
SL CV PED ECHO LEFT VENTRICLE DIASTOLIC VOLUME (MOD BIPLANE) 2D: 103 ML
SL CV PED ECHO LEFT VENTRICLE SYSTOLIC VOLUME (MOD BIPLANE) 2D: 54 ML
TR MAX PG: 23 MMHG
TR PEAK VELOCITY: 2.4 M/S
TRICUSPID ANNULAR PLANE SYSTOLIC EXCURSION: 2.6 CM
TRICUSPID VALVE PEAK REGURGITATION VELOCITY: 2.39 M/S

## 2025-03-21 PROCEDURE — 99203 OFFICE O/P NEW LOW 30 MIN: CPT

## 2025-03-21 NOTE — PATIENT INSTRUCTIONS
CT urogram- looks at the kidneys/ ureters and bladder in 3 different ways   Cystoscopy- looks In the bladder with a camera- done in the office

## 2025-03-21 NOTE — ASSESSMENT & PLAN NOTE
Microscopic hematuria and gross hematuria- on and off for months   UA 2/25-RBCs innumerable  No infection present  Smoking history-denies   Chemical exposure-denies   Family history-no   Reviewed AUA recommendations of proceeding with cystoscopy and CT urogram to evaluate cause of gross hematuria  Patient would like to defer as he feels that it is related to his Plavix and Eliquis  Discussed that this may be the cause however it would still be recommended to proceed with imaging and cystoscopy to rule out urological malignancy  He did have renal ultrasound which was reviewed with the patient-no masses or lesions seen, nonobstructing stone visualized  He plans to call the office if he wishes to proceed  Otherwise follow-up as needed

## 2025-03-21 NOTE — ASSESSMENT & PLAN NOTE
Microscopic hematuria  UA 2/25-RBCs innumerable  No infection present  Smoking history  Chemical exposure  Family history  Reviewed AUA recommendations  Plan to proceed with CT urogram and cystoscopy to further evaluate for microscopic hematuria

## 2025-03-25 ENCOUNTER — RESULTS FOLLOW-UP (OUTPATIENT)
Dept: CARDIOLOGY CLINIC | Facility: CLINIC | Age: 75
End: 2025-03-25

## 2025-04-01 ENCOUNTER — OFFICE VISIT (OUTPATIENT)
Dept: CARDIOLOGY CLINIC | Facility: CLINIC | Age: 75
End: 2025-04-01
Payer: COMMERCIAL

## 2025-04-01 VITALS
SYSTOLIC BLOOD PRESSURE: 108 MMHG | WEIGHT: 212 LBS | BODY MASS INDEX: 27.21 KG/M2 | DIASTOLIC BLOOD PRESSURE: 60 MMHG | OXYGEN SATURATION: 98 % | HEART RATE: 80 BPM | HEIGHT: 74 IN

## 2025-04-01 DIAGNOSIS — I10 PRIMARY HYPERTENSION: ICD-10-CM

## 2025-04-01 DIAGNOSIS — I50.32 HEART FAILURE WITH IMPROVED EJECTION FRACTION (HFIMPEF) (HCC): Primary | ICD-10-CM

## 2025-04-01 DIAGNOSIS — I48.91 ATRIAL FIBRILLATION, UNSPECIFIED TYPE (HCC): ICD-10-CM

## 2025-04-01 DIAGNOSIS — I25.10 CORONARY ARTERY DISEASE INVOLVING NATIVE CORONARY ARTERY OF NATIVE HEART WITHOUT ANGINA PECTORIS: ICD-10-CM

## 2025-04-01 PROCEDURE — 99214 OFFICE O/P EST MOD 30 MIN: CPT | Performed by: INTERNAL MEDICINE

## 2025-04-01 NOTE — PATIENT INSTRUCTIONS
Continue plavix until June 2025 then start aspirin 81mg daily  Continue rest of cardiac medications  2g sodium diet  1500-1800mL fluid diet  Daily weights, call office for weight gain of 3 lbs in a day or 5 lbs in 5-7 days.

## 2025-04-01 NOTE — PROGRESS NOTES
Advanced Heart Failure Outpatient Progress Note - Amol Montes De Oca 74 y.o. male MRN: [id number]    Encounter: [id number]      Assessment/Plan:    Patient Active Problem List    Diagnosis Date Noted    Microscopic hematuria 03/21/2025    Low testosterone 03/21/2025    Gross hematuria 03/21/2025    S/P coronary artery stent placement 12/02/2024    Cardiomyopathy, unspecified ([facility]) 11/08/2024    Status post placement of cardiac pacemaker 11/08/2024    GERD (gastroesophageal reflux disease)     Gout     Dizziness 08/21/2024    Atrial fibrillation, unspecified type ([facility]) 07/25/2024    Coronary artery disease involving native coronary artery of native heart with angina pectoris ([facility]) 07/22/2024    HFrEF (heart failure with reduced ejection fraction) ([facility]) 07/10/2024    Hypertension     Hyperlipidemia        # Heart failure with improved EF  Etiology: Ischemic and nonischemic components.  Severe stenosis of proximal LAD on cardiac catheterization. Degree of cardiomyopathy out of proportion to CAD.  Paroxysmal atrial fibrillation on Zio.  Dyssynchrony with wide LBBB    Studies- personally reviewed by me    Echo 3/19/25:  LVEF 52%  LVIDD  Grade 1 diastology  Normal RV size and systolic function  Mild AS, no AI.   Mild TR, PASP 26mmHg  No pericardial effusion    Echocardiogram 10/18/24  LVEF: 40%  LVIDd: 4.7cm  RV: Normal RV size and systolic function  AV: Mild stenosis, mean gradient 12 mmHg.  MR: None seen  PASP: 35 mmHg, trace TR, estimated RAP 3 mmHg  RVOT: No notching  Other: No pericardial effusion    Echo 7/2024: LVEF 30-35% per documentation    [facility] 10/18/24: Status post IVUS guided PCI with ROBERT x 1 to the proximal LAD stenosis  [facility] 7/22/24: 80% proximal LAD stenosis.  Left main normal.  Mild diffuse disease in the left circumflex and RCA    Neurohormonal Blockade:  --Beta-Blocker: metoprolol succinate 25mg daily  --ACEi, ARB or ARNi: entresto 24-26mg BID  --Aldosterone Receptor Blocker: spironolactone 12.5mg  "daily  --SGLT2 Inhibitor: Jardiance 10mg daily  --Diuretic: none    Sudden Cardiac Death Risk Reduction:  --ICD: not indicated, EF improved to > 35%    Electrical Resynchronization:  --Candidacy for BiV device: LBBB, QRSd 166-170msec s/p DC PPM with HIS/Left bundle pacing w deep septal lead to preserve narrow QRS  11/8/24   msec on EKG 12/2/24    # LBBB, QRSd 166-170 msec  S/p DC PPM with HIS/Left bundle pacing w deep septal lead to preserve narrow QRS   implant 11/8/24   msec on EKG 12/2/24    # PACs, PVCs  Low burden on 1 week holter 7/1/24  Metoprolol as above    # Paroxysmal atrial fibrillation   30% burden on 1 week holter 7/1/24  Continue with Toprol for rate control  Continue anticoagulation with Eliquis  2-week ZIO 8/6/24: Patient had a min HR of 45 bpm, max HR of 130 bpm, and avg HR of 66 bpm. Predominant underlying rhythm was Sinus Rhythm. Bundle Branch Block/IVCD was present. 1 run of Ventricular Tachycardia occurred lasting 6 beats with a max rate of 130 bpm (avg 116 bpm). 260 Supraventricular Tachycardia runs occurred, the run with the fastest interval lasting 7 beats with a max rate of 126 bpm, the longest lasting 20.4 secs with an avg rate of 103 bpm. Isolated SVEs were frequent (9.0%, 32252), SVE Couplets were rare (<1.0%, 404), and SVE Triplets were rare (<1.0%, 93). Isolated VEs were rare (<1.0%, 3800), VE Couplets were rare (<1.0%, 524), and VE Triplets were rare (<1.0%, 141  \"Reviewed strips and agree. DIfficult to tell if afib, P waves are small but rhythm is regular.  He has plan for BIV ICD and is on anticoagulation. NO changes recommended. \" Per Dr. Mcfarland.    # Coronary artery disease, 80% stenosis of proximal LAD s/p IVUS guided PCI with ROBERT 12/2/24  Continue dual antithrombotic therapy with Plavix and apixaban.  Minimum of 6 months of therapy with Plavix  No anginal symptoms. Plan to stop plavix and Allyson and start aspirin 81mg daily    # Vertigo      Today's Plan:  Continue " plavix until June 2025 then start aspirin 81mg daily  Continue rest of cardiac medications  2g sodium diet  1500-1800mL fluid diet  Daily weights, call office for weight gain of 3 lbs in a day or 5 lbs in 5-7 days.        HPI:   7/25/2024: 74-year-old male with past medical history of hypertension, hyperlipidemia, gout who is referred by Dr. Mcfarland for heart failure evaluation.  Patient was undergoing regular 6-month follow-up with PCP about 3 to 4 weeks ago when he was noted to have abnormal EKG, and possible A-fib.  Also had an echocardiogram which reportedly had a reduced EF at 30 to 35%.  ARB was switched to Entresto and metoprolol was added.  He was also started on anticoagulation with Eliquis.  He was seen by Dr. Mcfarland on July 10 for evaluation.  Office EKG noted to have normal sinus rhythm with premature atrial contractions and left bundle branch block.  Patient had a 1 week Zio prior to that office visit with concern for possible atrial fibrillation but report not available for review at that time.  Cardiac catheterization was ordered to rule out ischemic cardiomyopathy, referred to heart failure for further evaluation and management and 2-week Zio ordered.  1 week ZIO on July 1 showed predominantly normal sinus rhythm with AF burden of 30%.  PVC burden 4.6%.  PAC burden 5.2%.  Average heart rate during normal sinus rhythm 70 bpm, 77 bpm during atrial fibrillation.  He had a cardiac catheterization on July 22, 2024 which showed 80% proximal LAD stenosis.  Interventional cardiology recommended CTS evaluation for CABG/RACHEL ligation and possible viability study.   He is currently completing the 2-week heart monitor.  Patient reports overall stable exertional capacity leading up to routine follow-up with PCP.  He denies leg swelling, PND orthopnea.  He is able to walk 2 flights of stairs and would need to catch his breath for short period.  Denies palpitations or lightheadedness.  He has intermittent of  "dizziness due to vertigo.  No syncope.  FH: father  young at 60, had open heart surgery and also had cancer at that time   SH: No smoking or drug use, alcohol intake of 1 drink a day, recently discontinued    9/10/2024: Patient is here for follow-up.  Started Jardiance on last visit.  Repeat echo still overdue.  Cardiac MRI canceled by patient per documentation.  Admitted 2024 for dizziness.  CTA of the head was unremarkable.  Neurology was consulted and patient underwent brain MRI without any intracranial abnormalities.  Patient discharged home on meclizine as needed for peripheral vertigo.  2024 sodium 143 potassium 3.7 creatinine 1.1. Home sleep study with PCP - \"very mild sleep apnea\". Denies chest pain or shortness of breath on current level of exertion. No leg swelling, PND or orthopnea.       24: here for follow up.  Status post BiV pacer implant and IVUS guided PCI with ROBERT to the proximal LAD.  He has been reporting intermittent episodes of transient dizziness or lightheadedness also notable with standing up.  Also with concentrated/dark urine and wakes up in the morning with dry mouth.  He drinks 2 cups of coffee a day and a glass of water here and there with meals.  He has been having these episodes before device implant.  Device interrogation with no arrhythmias noted.  Also reporting probable mild breast enlargement.  Denies any breast tenderness or pain.  He has looking up potential side effects of medications and may be overthinking them.  Advised to continue to monitor and and can stop spironolactone if breast symptoms confirmed.     25: Here for follow up. Denies cardiac symptoms - no chest pain or shortness of breath on current level of exertion. No palpitations dizziness or lightheadedness. Exercises 3-4 times a week for 60-70 mins and lifting some weights. Repeat echo showed improved EF to 52%. Concern for skipped beats during study, patient know to have PACs and PVCs on " heart monitor. Also endorsed blood in the urine that has now cleared out. Had dark/concentrated urine that has improved with increase in oral fluid intake. Also with intermittent cold foot and hand on the right side, no swelling.       Past Medical History:   Diagnosis Date    COVID-19     1/2023 recoverd @ home    GERD (gastroesophageal reflux disease)     Gout     Hyperlipidemia     Hypertension     Serum cholesterol elevated        Review of Systems   Constitutional:  Negative for chills, fatigue and fever.   HENT:  Negative for ear pain and sore throat.    Eyes:  Negative for pain and visual disturbance.   Respiratory:  Negative for cough, chest tightness and shortness of breath.    Cardiovascular:  Negative for chest pain, palpitations and leg swelling.   Gastrointestinal:  Negative for abdominal distention, abdominal pain and vomiting.   Genitourinary:  Negative for dysuria and hematuria.   Musculoskeletal:  Negative for arthralgias and back pain.   Skin:  Negative for color change and rash.   Neurological:  Negative for dizziness, seizures, syncope and light-headedness.   All other systems reviewed and are negative.       No Known Allergies  .    Current Outpatient Medications:     atorvastatin (LIPITOR) 20 mg tablet, Take 20 mg by mouth daily at bedtime, Disp: , Rfl:     CALCIUM-VITAMIN D PO, Take 1 capsule by mouth in the morning, Disp: , Rfl:     clopidogrel (PLAVIX) 75 mg tablet, TAKE 1 TABLET (75 MG TOTAL) BY MOUTH DAILY DO NOT START BEFORE DECEMBER 3, 2024., Disp: 90 tablet, Rfl: 1    Eliquis 5 MG, Take 1 tablet (5 mg total) by mouth 2 (two) times a day Do not start before December 3, 2024., Disp: , Rfl:     Empagliflozin (JARDIANCE) 10 MG TABS tablet, Take 1 tablet (10 mg total) by mouth every morning, Disp: 30 tablet, Rfl: 0    indomethacin (INDOCIN) 50 mg capsule, Take 50 mg by mouth if needed (takes for gout pain as needed), Disp: , Rfl:     meclizine (ANTIVERT) 12.5 MG tablet, Take 1 tablet (12.5  mg total) by mouth every 8 (eight) hours as needed for dizziness, Disp: 10 tablet, Rfl: 0    metoprolol succinate (TOPROL-XL) 25 mg 24 hr tablet, Take 25 mg by mouth daily, Disp: , Rfl:     Multiple Vitamins-Minerals (multivitamin with minerals) tablet, Take 1 tablet by mouth daily, Disp: , Rfl:     sacubitril-valsartan (Entresto) 24-26 MG TABS, Take 1 tablet by mouth 2 (two) times a day, Disp: , Rfl:     spironolactone (ALDACTONE) 25 mg tablet, TAKE 1/2 TABLET BY MOUTH EVERY DAY, Disp: 45 tablet, Rfl: 4    testosterone (ANDROGEL) 25 MG/2.5GM (1%) GEL, Place 25 mg on the skin daily, Disp: , Rfl:     VITAMIN D PO, Take 1 capsule by mouth in the morning, Disp: , Rfl:     acetaminophen (TYLENOL) 325 mg tablet, Take 650 mg by mouth every 6 (six) hours as needed for mild pain (Patient not taking: Reported on 3/21/2025), Disp: , Rfl:     loratadine (CLARITIN) 10 mg tablet, Take 10 mg by mouth daily (Patient not taking: Reported on 4/1/2025), Disp: , Rfl:     LORazepam (ATIVAN) 0.5 mg tablet, Take 0.5 mg by mouth 2 (two) times a day As needed for MRI (Patient not taking: Reported on 12/9/2024), Disp: , Rfl:     Social History     Socioeconomic History    Marital status: /Civil Union     Spouse name: Not on file    Number of children: Not on file    Years of education: Not on file    Highest education level: Not on file   Occupational History    Not on file   Tobacco Use    Smoking status: Never    Smokeless tobacco: Never    Tobacco comments:     No smoking in the home   Vaping Use    Vaping status: Never Used   Substance and Sexual Activity    Alcohol use: Not Currently     Comment: 1 drink a day. varies beer wine or liqour 6/12    Drug use: Never    Sexual activity: Not on file   Other Topics Concern    Not on file   Social History Narrative    Not on file     Social Drivers of Health     Financial Resource Strain: Not on file   Food Insecurity: Not on file   Transportation Needs: Not on file   Physical Activity:  "Not on file   Stress: Not on file   Social Connections: Not on file   Intimate Partner Violence: Not on file   Housing Stability: Not on file       No family history on file.    Physical Exam:    Vitals: Blood pressure 108/60, pulse 80, height 6' 2\" (1.88 m), weight 96.2 kg (212 lb), SpO2 98%., Body mass index is 27.22 kg/m².,   Wt Readings from Last 3 Encounters:   04/01/25 96.2 kg (212 lb)   03/21/25 93.9 kg (207 lb)   03/19/25 99.3 kg (219 lb)       Physical Exam  Constitutional:       General: He is not in acute distress.     Appearance: Normal appearance.   HENT:      Head: Normocephalic and atraumatic.      Mouth/Throat:      Mouth: Mucous membranes are moist.   Eyes:      General: No scleral icterus.     Extraocular Movements: Extraocular movements intact.   Neck:      Vascular: No JVD.   Cardiovascular:      Rate and Rhythm: Normal rate and regular rhythm.      Pulses: Normal pulses.      Heart sounds: S1 normal and S2 normal. No murmur heard.     No friction rub. No gallop.   Pulmonary:      Effort: Pulmonary effort is normal.      Breath sounds: Normal breath sounds.   Abdominal:      General: There is no distension.      Palpations: Abdomen is soft.      Tenderness: There is no abdominal tenderness. There is no guarding or rebound.   Musculoskeletal:         General: Normal range of motion.      Cervical back: Neck supple.      Right lower leg: No edema.      Left lower leg: No edema.   Skin:     General: Skin is warm and dry.      Capillary Refill: Capillary refill takes less than 2 seconds.   Neurological:      General: No focal deficit present.      Mental Status: He is alert and oriented to person, place, and time.   Psychiatric:         Mood and Affect: Mood normal.         Labs & Results:    Lab Results   Component Value Date    WBC 8.26 02/25/2025    HGB 17.9 (H) 02/25/2025    HCT 55.2 (H) 02/25/2025    MCV 89 02/25/2025     02/25/2025     Lab Results   Component Value Date    SODIUM 142 " "02/25/2025    K 4.0 02/25/2025     02/25/2025    CO2 30 02/25/2025    BUN 22 02/25/2025    CREATININE 1.05 02/25/2025    GLUC 104 11/08/2024    CALCIUM 10.1 02/25/2025     No results found for: \"NTBNP\"   Lab Results   Component Value Date    CHOLESTEROL 109 08/22/2024    CHOLESTEROL 153 06/03/2024    CHOLESTEROL 197 11/27/2023     Lab Results   Component Value Date    HDL 36 (L) 08/22/2024    HDL 51 06/03/2024    HDL 56 11/27/2023     Lab Results   Component Value Date    TRIG 63 08/22/2024    TRIG 72 06/03/2024    TRIG 114 11/27/2023     Lab Results   Component Value Date    NONHDLC 102 06/03/2024    NONHDLC 141 11/27/2023    NONHDLC 107 12/14/2022       EKG personally reviewed by Tequila Palma MD.       Thank you for the opportunity to participate in the care of this patient.    TEQUILA PALMA MD  ADVANCED HEART FAILURE AND MECHANICAL CIRCULATORY SUPPORT  [facility]     "

## 2025-05-20 ENCOUNTER — APPOINTMENT (OUTPATIENT)
Dept: LAB | Facility: CLINIC | Age: 75
End: 2025-05-20
Payer: COMMERCIAL

## 2025-05-20 ENCOUNTER — TRANSCRIBE ORDERS (OUTPATIENT)
Dept: LAB | Facility: CLINIC | Age: 75
End: 2025-05-20

## 2025-05-20 DIAGNOSIS — E78.5 HYPERLIPIDEMIA, UNSPECIFIED HYPERLIPIDEMIA TYPE: ICD-10-CM

## 2025-05-20 DIAGNOSIS — E55.9 VITAMIN D DEFICIENCY: ICD-10-CM

## 2025-05-20 DIAGNOSIS — N40.1 BENIGN PROSTATIC HYPERPLASIA WITH LOWER URINARY TRACT SYMPTOMS, SYMPTOM DETAILS UNSPECIFIED: ICD-10-CM

## 2025-05-20 DIAGNOSIS — I10 ESSENTIAL HYPERTENSION, BENIGN: Primary | ICD-10-CM

## 2025-05-20 DIAGNOSIS — R53.83 OTHER FATIGUE: ICD-10-CM

## 2025-05-20 DIAGNOSIS — Z79.899 ENCOUNTER FOR LONG-TERM (CURRENT) USE OF MEDICATIONS: ICD-10-CM

## 2025-05-20 DIAGNOSIS — I10 ESSENTIAL HYPERTENSION, BENIGN: ICD-10-CM

## 2025-05-20 DIAGNOSIS — E29.1 3-OXO-5 ALPHA-STEROID DELTA 4-DEHYDROGENASE DEFICIENCY: ICD-10-CM

## 2025-05-20 LAB
25(OH)D3 SERPL-MCNC: 65.1 NG/ML (ref 30–100)
ALBUMIN SERPL BCG-MCNC: 4.4 G/DL (ref 3.5–5)
ALP SERPL-CCNC: 57 U/L (ref 34–104)
ALT SERPL W P-5'-P-CCNC: 14 U/L (ref 7–52)
ANION GAP SERPL CALCULATED.3IONS-SCNC: 9 MMOL/L (ref 4–13)
AST SERPL W P-5'-P-CCNC: 21 U/L (ref 13–39)
BACTERIA UR QL AUTO: ABNORMAL /HPF
BASOPHILS # BLD AUTO: 0.02 THOUSANDS/ÂΜL (ref 0–0.1)
BASOPHILS NFR BLD AUTO: 0 % (ref 0–1)
BILIRUB SERPL-MCNC: 0.79 MG/DL (ref 0.2–1)
BILIRUB UR QL STRIP: NEGATIVE
BUN SERPL-MCNC: 22 MG/DL (ref 5–25)
CALCIUM SERPL-MCNC: 9.6 MG/DL (ref 8.4–10.2)
CHLORIDE SERPL-SCNC: 107 MMOL/L (ref 96–108)
CHOLEST SERPL-MCNC: 147 MG/DL (ref ?–200)
CLARITY UR: ABNORMAL
CO2 SERPL-SCNC: 28 MMOL/L (ref 21–32)
COLOR UR: YELLOW
CREAT SERPL-MCNC: 0.92 MG/DL (ref 0.6–1.3)
CREAT UR-MCNC: 180.7 MG/DL
EOSINOPHIL # BLD AUTO: 0.38 THOUSAND/ÂΜL (ref 0–0.61)
EOSINOPHIL NFR BLD AUTO: 6 % (ref 0–6)
ERYTHROCYTE [DISTWIDTH] IN BLOOD BY AUTOMATED COUNT: 14 % (ref 11.6–15.1)
FOLATE SERPL-MCNC: >22.3 NG/ML
GFR SERPL CREATININE-BSD FRML MDRD: 79 ML/MIN/1.73SQ M
GLUCOSE P FAST SERPL-MCNC: 100 MG/DL (ref 65–99)
GLUCOSE UR STRIP-MCNC: ABNORMAL MG/DL
HCT VFR BLD AUTO: 54.2 % (ref 36.5–49.3)
HDLC SERPL-MCNC: 49 MG/DL
HGB BLD-MCNC: 17.5 G/DL (ref 12–17)
HGB UR QL STRIP.AUTO: ABNORMAL
IMM GRANULOCYTES # BLD AUTO: 0.01 THOUSAND/UL (ref 0–0.2)
IMM GRANULOCYTES NFR BLD AUTO: 0 % (ref 0–2)
KETONES UR STRIP-MCNC: NEGATIVE MG/DL
LDLC SERPL CALC-MCNC: 84 MG/DL (ref 0–100)
LEUKOCYTE ESTERASE UR QL STRIP: ABNORMAL
LH SERPL-ACNC: 0.5 MIU/ML (ref 1.2–8.6)
LYMPHOCYTES # BLD AUTO: 1.81 THOUSANDS/ÂΜL (ref 0.6–4.47)
LYMPHOCYTES NFR BLD AUTO: 30 % (ref 14–44)
MCH RBC QN AUTO: 29.6 PG (ref 26.8–34.3)
MCHC RBC AUTO-ENTMCNC: 32.3 G/DL (ref 31.4–37.4)
MCV RBC AUTO: 92 FL (ref 82–98)
MICROALBUMIN UR-MCNC: 269.8 MG/L
MICROALBUMIN/CREAT 24H UR: 149 MG/G CREATININE (ref 0–30)
MONOCYTES # BLD AUTO: 0.48 THOUSAND/ÂΜL (ref 0.17–1.22)
MONOCYTES NFR BLD AUTO: 8 % (ref 4–12)
MUCOUS THREADS UR QL AUTO: ABNORMAL
NEUTROPHILS # BLD AUTO: 3.25 THOUSANDS/ÂΜL (ref 1.85–7.62)
NEUTS SEG NFR BLD AUTO: 56 % (ref 43–75)
NITRITE UR QL STRIP: NEGATIVE
NON-SQ EPI CELLS URNS QL MICRO: ABNORMAL /HPF
NONHDLC SERPL-MCNC: 98 MG/DL
NRBC BLD AUTO-RTO: 0 /100 WBCS
PH UR STRIP.AUTO: 5.5 [PH]
PLATELET # BLD AUTO: 169 THOUSANDS/UL (ref 149–390)
PMV BLD AUTO: 11.3 FL (ref 8.9–12.7)
POTASSIUM SERPL-SCNC: 4.2 MMOL/L (ref 3.5–5.3)
PROT SERPL-MCNC: 7.4 G/DL (ref 6.4–8.4)
PROT UR STRIP-MCNC: ABNORMAL MG/DL
RBC # BLD AUTO: 5.92 MILLION/UL (ref 3.88–5.62)
RBC #/AREA URNS AUTO: ABNORMAL /HPF
SODIUM SERPL-SCNC: 144 MMOL/L (ref 135–147)
SP GR UR STRIP.AUTO: 1.03 (ref 1–1.03)
T4 FREE SERPL-MCNC: 0.89 NG/DL (ref 0.61–1.12)
TRIGL SERPL-MCNC: 72 MG/DL (ref ?–150)
TSH SERPL DL<=0.05 MIU/L-ACNC: 4.72 UIU/ML (ref 0.45–4.5)
UROBILINOGEN UR STRIP-ACNC: <2 MG/DL
VIT B12 SERPL-MCNC: 421 PG/ML (ref 180–914)
WBC # BLD AUTO: 5.95 THOUSAND/UL (ref 4.31–10.16)
WBC #/AREA URNS AUTO: ABNORMAL /HPF

## 2025-05-20 PROCEDURE — 82670 ASSAY OF TOTAL ESTRADIOL: CPT

## 2025-05-20 PROCEDURE — 84403 ASSAY OF TOTAL TESTOSTERONE: CPT

## 2025-05-20 PROCEDURE — 82043 UR ALBUMIN QUANTITATIVE: CPT

## 2025-05-20 PROCEDURE — 82570 ASSAY OF URINE CREATININE: CPT

## 2025-05-20 PROCEDURE — 80053 COMPREHEN METABOLIC PANEL: CPT

## 2025-05-20 PROCEDURE — 80061 LIPID PANEL: CPT

## 2025-05-20 PROCEDURE — 84270 ASSAY OF SEX HORMONE GLOBUL: CPT

## 2025-05-20 PROCEDURE — 85025 COMPLETE CBC W/AUTO DIFF WBC: CPT

## 2025-05-20 PROCEDURE — 81001 URINALYSIS AUTO W/SCOPE: CPT

## 2025-05-20 PROCEDURE — 84443 ASSAY THYROID STIM HORMONE: CPT

## 2025-05-20 PROCEDURE — 36415 COLL VENOUS BLD VENIPUNCTURE: CPT

## 2025-05-20 PROCEDURE — 83002 ASSAY OF GONADOTROPIN (LH): CPT

## 2025-05-20 PROCEDURE — 82306 VITAMIN D 25 HYDROXY: CPT

## 2025-05-20 PROCEDURE — 82746 ASSAY OF FOLIC ACID SERUM: CPT

## 2025-05-20 PROCEDURE — 84439 ASSAY OF FREE THYROXINE: CPT

## 2025-05-20 PROCEDURE — 84402 ASSAY OF FREE TESTOSTERONE: CPT

## 2025-05-20 PROCEDURE — 82607 VITAMIN B-12: CPT

## 2025-05-21 LAB
SHBG SERPL-SCNC: 23.3 NMOL/L (ref 19.3–76.4)
TESTOST FREE SERPL-MCNC: 7.8 PG/ML (ref 6.6–18.1)
TESTOST SERPL-MCNC: 436 NG/DL (ref 264–916)

## 2025-05-22 LAB — ESTRADIOL SERPL-MCNC: 30.2 PG/ML (ref 7.6–42.6)

## 2025-05-27 ENCOUNTER — TELEPHONE (OUTPATIENT)
Dept: CARDIOLOGY CLINIC | Facility: CLINIC | Age: 75
End: 2025-05-27

## 2025-05-27 ENCOUNTER — REMOTE DEVICE CLINIC VISIT (OUTPATIENT)
Dept: CARDIOLOGY CLINIC | Facility: CLINIC | Age: 75
End: 2025-05-27
Payer: COMMERCIAL

## 2025-05-27 DIAGNOSIS — Z95.0 PRESENCE OF CARDIAC PACEMAKER: ICD-10-CM

## 2025-05-27 DIAGNOSIS — I47.29 NSVT (NONSUSTAINED VENTRICULAR TACHYCARDIA) (HCC): Primary | ICD-10-CM

## 2025-05-27 DIAGNOSIS — I50.32 HEART FAILURE WITH IMPROVED EJECTION FRACTION (HFIMPEF) (HCC): Primary | ICD-10-CM

## 2025-05-27 DIAGNOSIS — I48.91 ATRIAL FIBRILLATION, UNSPECIFIED TYPE (HCC): ICD-10-CM

## 2025-05-27 PROCEDURE — 93296 REM INTERROG EVL PM/IDS: CPT | Performed by: INTERNAL MEDICINE

## 2025-05-27 PROCEDURE — 93294 REM INTERROG EVL PM/LDLS PM: CPT | Performed by: INTERNAL MEDICINE

## 2025-05-27 RX ORDER — METOPROLOL SUCCINATE 25 MG/1
25 TABLET, EXTENDED RELEASE ORAL 2 TIMES DAILY
Qty: 180 TABLET | Refills: 1 | Status: SHIPPED | OUTPATIENT
Start: 2025-05-27

## 2025-05-27 NOTE — TELEPHONE ENCOUNTER
"Device report forwarded to Dr Palma for review.     Per Dr Palma, \"Please advise to increase metoprolol succinate to 25mg BID. Thanks \".    I called Mr Montes De Oca and spoke with him.   He will increase his metoprolol succinate dose   to 25 mg BID, and is requesting a refill for same at pharmacy listed on the chart.    He was at another Dr Ely't today and BP was great at 118/78, so he will watch his BP as well.    Med list reflects the change for the increased dose.      Message to Dr Palma for refill.                    "

## 2025-05-27 NOTE — PROGRESS NOTES
Results for orders placed or performed in visit on 05/27/25   Cardiac EP device report    Narrative    MDT DC/PM-ACTIVE SYSTEM IS MRI CONDITIONAL  CARELINK TRANSMISSION:  BATTERY VOLTAGE ADEQUATE (12 YRS).  AP 70.5%  85% (>40% PAF/DDDR 60 BPM) ALL AVAILABLE LEAD PARAMETERS WITHIN NORMAL LIMITS.  5 VT NS EPISODES W/AVAIL EGM'S SHOWING  BIGEMINAL PVC PATTERN & NSVT, 12 BEATS @ 209 BPM, 9 @ 220, 7 @ 200, 6 @ 200, 8 @ 179.  PT TAKES ELIQUIS, CLOPIDOGREL, METOPROLOL SUCC, ENTRESTO.  EF 52% (ECHO 3/19/2025). TIME IN AT/AF <0.1%.  PVC COUNT 92.8/HR (SINGLES). TASK TO DR MARVIN.  NORMAL DEVICE FUNCTION. PAS

## 2025-06-01 ENCOUNTER — RESULTS FOLLOW-UP (OUTPATIENT)
Dept: NON INVASIVE DIAGNOSTICS | Facility: HOSPITAL | Age: 75
End: 2025-06-01

## (undated) DEVICE — BALLOON EUPHORA RX 2.5 X 15MM

## (undated) DEVICE — BALLOON NC EUPHORA 3.75 X 15MM

## (undated) DEVICE — 3M™ STERI-STRIP™ REINFORCED ADHESIVE SKIN CLOSURES, R1547, 1/2 IN X 4 IN (12 MM X 100 MM), 6 STRIPS/ENVELOPE: Brand: 3M™ STERI-STRIP™

## (undated) DEVICE — RUNTHROUGH NS EXTRA FLOPPY PTCA GUIDEWIRE: Brand: RUNTHROUGH

## (undated) DEVICE — BALLOON EUPHORA RX 3 X 15MM

## (undated) DEVICE — SUT SILK 3-0 TIES 144 IN LA54G

## (undated) DEVICE — CATH GUIDING FIXED SHAPE 43CM

## (undated) DEVICE — GUIDEWIRE WHOLEY HI TORQUE INTERM MOD J .035 145CM

## (undated) DEVICE — CHLORAPREP HI-LITE 10.5ML ORANGE

## (undated) DEVICE — INTRO SHEATH PEEL AWAY 9 FR

## (undated) DEVICE — HEMOCLIP CARTRIDGE MED

## (undated) DEVICE — GLOVE SRG BIOGEL 8

## (undated) DEVICE — TR BAND RADIAL ARTERY COMPRESSION DEVICE: Brand: TR BAND

## (undated) DEVICE — INTRO SHEATH PEEL AWAY 7FR

## (undated) DEVICE — DGW .035 FC J3MM 260CM TEF: Brand: EMERALD

## (undated) DEVICE — CORONARY IMAGING CATHETER: Brand: OPTICROSS™ 6 HD

## (undated) DEVICE — GLIDESHEATH BASIC HYDROPHILIC COATED INTRODUCER SHEATH: Brand: GLIDESHEATH

## (undated) DEVICE — SUT SILK 0 SH 30 IN K834H

## (undated) DEVICE — PENCIL ELECTROSURG E-Z CLEAN -0035H

## (undated) DEVICE — SLITTER ADJUSTABLE

## (undated) DEVICE — GLOVE INDICATOR PI UNDERGLOVE SZ 8.5 BLUE

## (undated) DEVICE — SUT MONOCRYL 4-0 PS-2 18 IN Y496G

## (undated) DEVICE — BIPOLAR CORD DISP

## (undated) DEVICE — CATH GUIDE LAUNCHER 6FR EBU 3.5

## (undated) DEVICE — LIGACLIP MCA MULTIPLE CLIP APPLIERS, 20 MEDIUM CLIPS: Brand: LIGACLIP

## (undated) DEVICE — CATH GUIDE LAUNCHER 5FR EBU 3.5

## (undated) DEVICE — STERILE MANDIBLE PACK: Brand: CARDINAL HEALTH

## (undated) DEVICE — 3M™ STERI-STRIP™ REINFORCED ADHESIVE SKIN CLOSURES, R1546, 1/4 IN X 4 IN (6 MM X 100 MM), 10 STRIPS/ENVELOPE: Brand: 3M™ STERI-STRIP™

## (undated) DEVICE — SUT SILK 3-0 SH 30 IN K832H

## (undated) DEVICE — GLOVE SRG BIOGEL ORTHOPEDIC 7.5

## (undated) DEVICE — LEAD 3830 US MKT/ 69CM MRI LBBAP
Type: IMPLANTABLE DEVICE | Site: HEART | Status: NON-FUNCTIONAL
Brand: SELECTSECURE™ MRI SURESCAN™
Removed: 2024-11-08

## (undated) DEVICE — RADIFOCUS OPTITORQUE ANGIOGRAPHIC CATHETER: Brand: OPTITORQUE

## (undated) DEVICE — ELECTRODE BLADE MOD E-Z CLEAN 2.5IN 6.4CM -0012M

## (undated) DEVICE — SUT VICRYL 3-0 SH 27 IN J416H